# Patient Record
Sex: FEMALE | Race: WHITE | NOT HISPANIC OR LATINO | Employment: PART TIME | URBAN - METROPOLITAN AREA
[De-identification: names, ages, dates, MRNs, and addresses within clinical notes are randomized per-mention and may not be internally consistent; named-entity substitution may affect disease eponyms.]

---

## 2017-01-30 ENCOUNTER — COMMUNICATION - HEALTHEAST (OUTPATIENT)
Dept: TELEHEALTH | Facility: CLINIC | Age: 52
End: 2017-01-30

## 2017-01-30 ENCOUNTER — HOSPITAL ENCOUNTER (OUTPATIENT)
Dept: CT IMAGING | Facility: CLINIC | Age: 52
Discharge: HOME | End: 2017-01-30
Attending: PHYSICIAN ASSISTANT

## 2017-01-30 ENCOUNTER — OFFICE VISIT - HEALTHEAST (OUTPATIENT)
Dept: UROLOGY | Facility: CLINIC | Age: 52
End: 2017-01-30

## 2017-01-30 DIAGNOSIS — N29 NEPHROCALCINOSIS: ICD-10-CM

## 2017-01-30 DIAGNOSIS — N20.9 URINARY CALCULUS, UNSPECIFIED: ICD-10-CM

## 2017-01-30 DIAGNOSIS — E83.59 NEPHROCALCINOSIS: ICD-10-CM

## 2017-04-11 ENCOUNTER — HOSPITAL ENCOUNTER (EMERGENCY)
Facility: CLINIC | Age: 52
Discharge: HOME | End: 2017-04-11
Attending: EMERGENCY MEDICINE | Admitting: EMERGENCY MEDICINE
Payer: COMMERCIAL

## 2017-04-11 ENCOUNTER — APPOINTMENT (OUTPATIENT)
Dept: GENERAL RADIOLOGY | Facility: CLINIC | Age: 52
End: 2017-04-11
Attending: EMERGENCY MEDICINE
Payer: COMMERCIAL

## 2017-04-11 VITALS
DIASTOLIC BLOOD PRESSURE: 101 MMHG | HEIGHT: 67 IN | RESPIRATION RATE: 18 BRPM | HEART RATE: 83 BPM | WEIGHT: 165 LBS | SYSTOLIC BLOOD PRESSURE: 136 MMHG | TEMPERATURE: 97.3 F | OXYGEN SATURATION: 100 % | BODY MASS INDEX: 25.9 KG/M2

## 2017-04-11 DIAGNOSIS — M25.571 ACUTE RIGHT ANKLE PAIN: ICD-10-CM

## 2017-04-11 PROCEDURE — 99283 EMERGENCY DEPT VISIT LOW MDM: CPT

## 2017-04-11 PROCEDURE — 25000132 ZZH RX MED GY IP 250 OP 250 PS 637: Performed by: EMERGENCY MEDICINE

## 2017-04-11 PROCEDURE — 73610 X-RAY EXAM OF ANKLE: CPT | Mod: RT

## 2017-04-11 RX ORDER — OXYCODONE HYDROCHLORIDE 5 MG/1
5 TABLET ORAL ONCE
Status: COMPLETED | OUTPATIENT
Start: 2017-04-11 | End: 2017-04-11

## 2017-04-11 RX ORDER — OXYCODONE HYDROCHLORIDE 10 MG/1
10 TABLET ORAL EVERY 4 HOURS PRN
Qty: 10 TABLET | Refills: 0 | Status: SHIPPED | OUTPATIENT
Start: 2017-04-11 | End: 2018-01-24

## 2017-04-11 RX ADMIN — OXYCODONE HYDROCHLORIDE 5 MG: 5 TABLET ORAL at 22:05

## 2017-04-11 ASSESSMENT — ENCOUNTER SYMPTOMS
CHILLS: 0
FEVER: 0
NUMBNESS: 0

## 2017-04-11 NOTE — ED AVS SNAPSHOT
[facility] Emergency Department    [address]    [facility] [address]    Phone:  [phone]    Fax:  [phone]                                       Faby Yeung   MRN: [id number]    Department:  [facility] Emergency Department   Date of Visit:  4/11/2017           Patient Information     Date Of Birth          1965        Your diagnoses for this visit were:     Acute right ankle pain        You were seen by Chilango Renee MD.      Follow-up Information     Follow up with Chilango Kerns MD In 2 days.    Specialty:  Internal Medicine    Contact information:    [facility]  [address] [address]  [phone]          Follow up with Cheryl Arroyo MD In 1 week.    Specialty:  Internal Medicine    Why:  As needed    Contact information:    [facility]  [address]  [facility] [address]  [phone]          Discharge Instructions         Arthralgia    Arthralgia is the term for pain in or around the joint. It is a symptom, not a disease. This pain may involve one or more joints. In some cases, the pain moves from joint to joint.  There are many causes for joint pain. These include:    Injury    Osteoarthritis (wearing out of the joint surface)    Gout (inflammation of the joint due to crystals in the joint fluid)    Infection inside the joint      Bursitis (inflammation of the fluid-filled sacs around the joint)    Autoimmune disorders such as rheumatoid arthritis or lupus    Tendonitis (inflamation of chords that attach muscle to bone)  Home care    Rest the involved joint(s) until your symptoms improve.     You may be prescribed pain medication. If none is prescribed, you may use acetaminophen or ibuprofen to control pain and inflammation.  Follow up  Follow up with your healthcare provider or our staff as advised.  When to seek medical care  Contact your healthcare provider right away if any of the  following occurs:    Pain, swelling, or redness of joint increases    Pain worsens or recurs after a period of improvement    Pain moves to other joints    You cannot bear weight on the affected joint     You cannot move the affected joint    Joint appears deformed    New rash appears    Fever of 101 F (38.8 C) or higher, or as directed by your healthcare provider    6587-8518 [employer]. [address] [place] [address], [place] [address]. All rights reserved. This information is not intended as a substitute for professional medical care. Always follow your healthcare professional's instructions.          24 Hour Appointment Hotline       To make an appointment at any [facility], call [phone] ([phone]). If you don't have a family doctor or clinic, we will help you find one. [facility] clinics are conveniently located to serve the needs of you and your family.             Review of your medicines      START taking        Dose / Directions Last dose taken    oxyCODONE 10 MG IR tablet   Commonly known as:  ROXICODONE   Dose:  10 mg   Quantity:  10 tablet        Take 1 tablet (10 mg) by mouth every 4 hours as needed for moderate to severe pain   Refills:  0          Our records show that you are taking the medicines listed below. If these are incorrect, please call your family doctor or clinic.        Dose / Directions Last dose taken    clindamycin 300 MG capsule   Commonly known as:  CLEOCIN   Dose:  300 mg   Quantity:  14 capsule        Take 1 capsule (300 mg) by mouth 2 times daily   Refills:  0        LORazepam 1 MG tablet   Commonly known as:  ATIVAN   Dose:  1 mg        Take 1 mg by mouth 3 times daily as needed for anxiety   Refills:  0        oxyCODONE-acetaminophen  MG per tablet   Commonly known as:  PERCOCET   Dose:  1-2 tablet   Quantity:  32 tablet        Take 1-2 tablets by mouth every 6 hours as needed for moderate to severe pain   Refills:  0        PROTONIX PO         Refills:  0        SERTRALINE HCL PO   Dose:  150 mg        Take 150 mg by mouth daily   Refills:  0        sodium citrate/citric acid 500-334 MG/5ML Soln solution   Dose:  30 mL        Take 30 mLs by mouth 3 times daily   Refills:  0        SYNTHROID 150 MCG tablet   Dose:  150 mcg   Generic drug:  levothyroxine        Take 150 mcg by mouth daily   Refills:  0                Prescriptions were sent or printed at these locations (1 Prescription)                   Other Prescriptions                Printed at Department/Unit printer (1 of 1)         oxyCODONE (ROXICODONE) 10 MG IR tablet                Procedures and tests performed during your visit     Ankle XR, G/E 3 views, right      Orders Needing Specimen Collection     None      Pending Results     No orders found from 4/9/2017 to 4/12/2017.            Pending Culture Results     No orders found from 4/9/2017 to 4/12/2017.            Test Results From Your Hospital Stay        4/11/2017 10:29 PM      Narrative     ANKLE THREE VIEWS RIGHT  4/11/2017 10:15 PM     HISTORY: Right medial malleolar pain, history of ORIF.    COMPARISON: None.    FINDINGS: Screw fixation of the medial malleolus is noted without  abnormal lucencies around the hardware. Mild loss of joint space and  spurring. The ankle mortise appears intact. There is no acute fracture  or dislocation. There are no worrisome bony lesions.        Impression     IMPRESSION: No acute osseous abnormality demonstrated.    ADELA NUR MD                Clinical Quality Measure: Blood Pressure Screening     Your blood pressure was checked while you were in the emergency department today. The last reading we obtained was  BP: (!) 143/108 . Please read the guidelines below about what these numbers mean and what you should do about them.  If your systolic blood pressure (the top number) is less than 120 and your diastolic blood pressure (the bottom number) is less than 80, then your blood pressure is normal.  "There is nothing more that you need to do about it.  If your systolic blood pressure (the top number) is 120-139 or your diastolic blood pressure (the bottom number) is 80-89, your blood pressure may be higher than it should be. You should have your blood pressure rechecked within a year by a primary care provider.  If your systolic blood pressure (the top number) is 140 or greater or your diastolic blood pressure (the bottom number) is 90 or greater, you may have high blood pressure. High blood pressure is treatable, but if left untreated over time it can put you at risk for heart attack, stroke, or kidney failure. You should have your blood pressure rechecked by a primary care provider within the next 4 weeks.  If your provider in the emergency department today gave you specific instructions to follow-up with your doctor or provider even sooner than that, you should follow that instruction and not wait for up to 4 weeks for your follow-up visit.        Thank you for choosing [facility]       Thank you for choosing [facility] for your care. Our goal is always to provide you with excellent care. Hearing back from our patients is one way we can continue to improve our services. Please take a few minutes to complete the written survey that you may receive in the mail after you visit with us. Thank you!        [email or web address] Information     [employer] lets you send messages to your doctor, view your test results, renew your prescriptions, schedule appointments and more. To sign up, go to [email or web address] . Click on \"Log in\" on the left side of the screen, which will take you to the Welcome page. Then click on \"Sign up Now\" on the right side of the page.     You will be asked to enter the access code listed below, as well as some personal information. Please follow the directions to create your username and password.     Your access code is: [email or web address]  Expires: 7/10/2017 10:30 PM     Your access code will  in 90 days. " If you need help or a new code, please call your [facility] clinic or [phone].        Care EveryWhere ID     This is your Care EveryWhere ID. This could be used by other organizations to access your [facility] medical records  [id number]        After Visit Summary       This is your record. Keep this with you and show to your community pharmacist(s) and doctor(s) at your next visit.

## 2017-04-11 NOTE — ED AVS SNAPSHOT
[facility] Emergency Department    [address]    [facility] [address]    Phone:  [phone]    Fax:  [phone]                                       Faby Yeung   MRN: [id number]    Department:  [facility] Emergency Department   Date of Visit:  4/11/2017           After Visit Summary Signature Page     I have received my discharge instructions, and my questions have been answered. I have discussed any challenges I see with this plan with the nurse or doctor.    ..........................................................................................................................................  Patient/Patient Representative Signature      ..........................................................................................................................................  Patient Representative Print Name and Relationship to Patient    ..................................................               ................................................  Date                                            Time    ..........................................................................................................................................  Reviewed by Signature/Title    ...................................................              ..............................................  Date                                                            Time

## 2017-04-12 NOTE — DISCHARGE INSTRUCTIONS
Arthralgia    Arthralgia is the term for pain in or around the joint. It is a symptom, not a disease. This pain may involve one or more joints. In some cases, the pain moves from joint to joint.  There are many causes for joint pain. These include:    Injury    Osteoarthritis (wearing out of the joint surface)    Gout (inflammation of the joint due to crystals in the joint fluid)    Infection inside the joint      Bursitis (inflammation of the fluid-filled sacs around the joint)    Autoimmune disorders such as rheumatoid arthritis or lupus    Tendonitis (inflamation of chords that attach muscle to bone)  Home care    Rest the involved joint(s) until your symptoms improve.     You may be prescribed pain medication. If none is prescribed, you may use acetaminophen or ibuprofen to control pain and inflammation.  Follow up  Follow up with your healthcare provider or our staff as advised.  When to seek medical care  Contact your healthcare provider right away if any of the following occurs:    Pain, swelling, or redness of joint increases    Pain worsens or recurs after a period of improvement    Pain moves to other joints    You cannot bear weight on the affected joint     You cannot move the affected joint    Joint appears deformed    New rash appears    Fever of 101 F (38.8 C) or higher, or as directed by your healthcare provider    [id number] [employer]. [address] [place] [address], [place] [address]. All rights reserved. This information is not intended as a substitute for professional medical care. Always follow your healthcare professional's instructions.

## 2017-04-12 NOTE — ED PROVIDER NOTES
"  History     Chief Complaint:  Ankle Pain (right)      HPI   Faby Yueng is a 51 year old female who presents for evaluation of right ankle pain. The patient reports that she has needed hardware placed in both ankles for fracture repair. She last had surgery on her right ankle in 06/2016. She notes that she has been developing progressively worsening pain to her right ankle and is concerned that she \"could be starting to reject the hardware.\" Currently the patient rates her right ankle pain as 8/10 and describes it as sharp, worse with ambulation or palpation. She denies any recent trauma to the ankle. She notes that she does have Percocet for pain, but states that she only takes this as needed for kidney pain secondary to her multiple and frequent kidney stones. She did take a Tylenol earlier this morning, before work, and denies taking anything else today for pain management. The patient otherwise denies any fevers, chills, focal numbness, or any other physical concerns at this time.    Allergies:  Penicillins - Anaphylaxis  Ibuprofen Sodium   Levaquin   Sulfa Drugs      Medications:    Pantoprazole sodium (protonix po)  Oxycodone-acetaminophen (percocet)  mg per tablet  Clindamycin (cleocin) 300 mg capsule  Levothyroxine (synthroid) 150 mcg tablet  Sertraline hcl po  Sodium citrate/citric acid (bicitra) 500-334 mg/5ml soln  Lorazepam (ativan) 1 mg tablet     Past Medical History:    Anxiety  Cellulitis  Chronic abdominal pain  Depressive disorder  Foot pain  Gastro-oesophageal reflux disease  Hx of blood clots  Leukocytopenia, unspecified  Nephrolithiasis  Pain in joint, ankle and foot  PONV (postoperative nausea and vomiting)  Pulmonary embolism (H)  Raynaud's syndrome  Scoliosis  Unspecified gastritis and gastroduodenitis without mention of hemorrhage  Unspecified hypothyroidism  Urinary tract infection    Past Surgical History:    Appendectomy    Arthrodesis ankle; left talonavicular fusion " "(c-arm)   section    Cystoscopy    Genitourinary surgery; lithotripsy  Hernia repair    Hysterectomy total abdominal, bilateral salpingo-oophorectomy, combined    Left foot irrigation and debridement, hardware removal  Orthopedic surgery - wrist, ankle, sherie narciso placement  Spine surgery     Family History:  Family history reviewed. No relevant family history.     Social History:  Marital status: Single  Tobacco use: Former smoke  Alcohol use: yes  Patient presents to the ED dropped off by a friend. Her daughter arrived at the emergency department shortly after patient arrival.        Review of Systems   Constitutional: Negative for chills and fever.   Musculoskeletal:        Positive for right ankle pain.  Negative for recent right ankle trauma.   Neurological: Negative for numbness.   All other systems reviewed and are negative.      Physical Exam   First Vitals:  BP: (!) 143/108  Pulse: 83  Heart Rate: 83  Temp: 97.3  F (36.3  C)  Resp: 18  Height: 170.2 cm (5' 7\")  Weight: 74.8 kg (165 lb)  SpO2: 98 %    Physical Exam   Constitutional: She appears well-developed.   Cardiovascular: Normal rate.    Pulmonary/Chest: Effort normal.   Abdominal: Soft. Bowel sounds are normal.   Musculoskeletal:        Right ankle: Tenderness. Lateral malleolus tenderness found.   Nursing note and vitals reviewed.      Emergency Department Course     Imaging:  Radiographic findings were communicated with the patient who voiced understanding of the findings.    XR Ankle G/E 3 vws, right:  IMPRESSION: No acute osseous abnormality demonstrated.  ADELA NUR MD     Interventions:   Oxycodone IR 5 mg, PO    Oxycodone IR 5 mg, PO      The patient's symptoms were improved with parenteral narcotics.    Emergency Department Course:   Nursing notes and vitals reviewed. I obtained a history from the patient. I performed an exam of the patient as documented above. We discussed the plan of care including imaging " studies.    Findings and plan explained to the Patient. Patient discharged home with instructions regarding supportive care, medications, and reasons to return. The importance of close follow-up was reviewed. The patient was prescribed a short course off Oxycodone and was provided a CAM boot.     Impression & Plan      Medical Decision Making:  Faby Yeung is a 51 year old female who presents with ankle pain. The patient has a history of chronic pain from kidney stones as well as recurring ankle surgery. Review of her  shows that the patient has been given weekly Oxycodone up until March 30th. The patient's x-rays show no acute findings. The patient was discussed with about chronic opioid therapy. The patient adamantly denies that she is here for pain medication, but apparently is out of her 45 Oxycodone form 03/30. Ultimately, the patient was offered 10 pills to help with pain, although the patient is encouraged to limit the use of opioids due to the opioid crisis in the [place] and follow up with her primary care doctor as well as Orthopedics. The patient is encouraged to use a CAM boot due to pain.    Diagnosis:    ICD-10-CM    1. Acute right ankle pain M25.571 Acute exacerbation of chronic right ankle pain   2. H/o opiate dependence         Disposition:  discharged to home with family.    Discharge Medications:  Discharge Medication List as of 4/11/2017 10:36 PM      START taking these medications    Details   oxyCODONE (ROXICODONE) 10 MG IR tablet Take 1 tablet (10 mg) by mouth every 4 hours as needed for moderate to severe pain, Disp-10 tablet, R-0, Local Print           I, Brittney Gutierrez, am serving as a scribe on 4/11/2017 at 9:41 PM to personally document services performed by Chilango Renee MD, based on my observations and the provider's statements to me.       Brittney Gutierrez  4/11/2017   [facility] EMERGENCY DEPARTMENT       Chilango Renee MD  04/17/17  [id number]

## 2017-04-12 NOTE — ED NOTES
Pt comes in with right ankle pain starting last night and worsened throughout the day. Pt unable to put weight on ankle. Pt denies any injury. Pt did break ankle last summer and needed surgery and pt reports that everything that they have put in her body her body has rejected. Pt has used ice for pain, pt took tylenol for pain today.

## 2018-01-24 ENCOUNTER — APPOINTMENT (OUTPATIENT)
Dept: CT IMAGING | Facility: CLINIC | Age: 53
End: 2018-01-24
Attending: EMERGENCY MEDICINE
Payer: COMMERCIAL

## 2018-01-24 ENCOUNTER — APPOINTMENT (OUTPATIENT)
Dept: GENERAL RADIOLOGY | Facility: CLINIC | Age: 53
End: 2018-01-24
Attending: EMERGENCY MEDICINE
Payer: COMMERCIAL

## 2018-01-24 ENCOUNTER — HOSPITAL ENCOUNTER (EMERGENCY)
Facility: CLINIC | Age: 53
Discharge: HOME | End: 2018-01-24
Attending: EMERGENCY MEDICINE | Admitting: EMERGENCY MEDICINE
Payer: COMMERCIAL

## 2018-01-24 VITALS
RESPIRATION RATE: 16 BRPM | TEMPERATURE: 98.1 F | OXYGEN SATURATION: 98 % | DIASTOLIC BLOOD PRESSURE: 83 MMHG | SYSTOLIC BLOOD PRESSURE: 119 MMHG

## 2018-01-24 DIAGNOSIS — S60.00XA CONTUSION OF FINGER, UNSPECIFIED FINGER, UNSPECIFIED LATERALITY, INITIAL ENCOUNTER: ICD-10-CM

## 2018-01-24 DIAGNOSIS — S13.9XXA NECK SPRAIN, INITIAL ENCOUNTER: ICD-10-CM

## 2018-01-24 DIAGNOSIS — S09.90XA CLOSED HEAD INJURY, INITIAL ENCOUNTER: ICD-10-CM

## 2018-01-24 PROCEDURE — 70450 CT HEAD/BRAIN W/O DYE: CPT

## 2018-01-24 PROCEDURE — 25000132 ZZH RX MED GY IP 250 OP 250 PS 637: Performed by: EMERGENCY MEDICINE

## 2018-01-24 PROCEDURE — 99285 EMERGENCY DEPT VISIT HI MDM: CPT | Mod: 25

## 2018-01-24 PROCEDURE — 73140 X-RAY EXAM OF FINGER(S): CPT | Mod: RT

## 2018-01-24 PROCEDURE — 72125 CT NECK SPINE W/O DYE: CPT

## 2018-01-24 RX ORDER — OXYCODONE HYDROCHLORIDE 5 MG/1
10 TABLET ORAL ONCE
Status: COMPLETED | OUTPATIENT
Start: 2018-01-24 | End: 2018-01-24

## 2018-01-24 RX ORDER — TIZANIDINE 2 MG/1
2 TABLET ORAL 3 TIMES DAILY
Qty: 20 TABLET | Refills: 0 | Status: ON HOLD | OUTPATIENT
Start: 2018-01-24 | End: 2021-02-26

## 2018-01-24 RX ADMIN — OXYCODONE HYDROCHLORIDE 10 MG: 5 TABLET ORAL at 11:16

## 2018-01-24 ASSESSMENT — ENCOUNTER SYMPTOMS
NECK PAIN: 1
VOMITING: 0
HEADACHES: 1
NAUSEA: 1

## 2018-01-24 NOTE — ED NOTES
Pt slipped backwards on ice and fell and hit back of head at 6pm yesterday. Denies LOC, was nauseated a little yesterday. Presents today because HA is worse, mostly on top of head. Denies vision changes or being on blood thinners. ABCs intact. Pupils PERRLA

## 2018-01-24 NOTE — ED NOTES
Pt resting Comfortably on bed, drawing.  Alert & Oriented  No specific complaints.  c-collar has been removed.

## 2018-01-24 NOTE — ED AVS SNAPSHOT
[facility]    [address]    [facility] [address]    Phone:  [phone]    Fax:  [phone]                                       Faby Yeung   MRN: [id number]    Department:  [facility]   Date of Visit:  1/24/2018           Patient Information     Date Of Birth          1965        Your diagnoses for this visit were:     Closed head injury, initial encounter     Neck sprain, initial encounter     Contusion of finger, unspecified finger, unspecified laterality, initial encounter        You were seen by Chilango Renee MD.      Follow-up Information     Follow up with [place], Helena Mahajan In 1 week.    Contact information:    [address] [facility] [address]  [phone]          Follow up with [facility] In 1 week.    Why:  As needed    Contact information:    [address]  [address]  [facility] [id number] [phone]        Discharge Instructions         Your finger x-ray shows no clear fracture though there is a possible injury to the end of the long bone of the third digit.  Use splint for 1 week if ongoing pain see primary care or orthopedics for repeat x-ray.    Discharge Instructions  Neck Strain    You have been seen today for a neck sprain or strain.  Neck strains usually result from an injury to the neck. Car accidents, contact sports and falls are common causes of neck strain. Sometimes your neck can start to hurt because of increased activity, muscle tension, an abnormal sleeping position, or because of other problems like arthritis in the neck.     Neck pain usually comes from injured muscles and ligaments. Sometimes there is a herniated ( slipped ) disc. We don t usually do MRI scans to look for these right away, since most herniated discs will get better on their own with time. Today, we did not find any evidence that your neck pain was  caused by a serious condition, such as an infection, fracture, or tumor. However, sometimes symptoms develop over time and cannot be found during an emergency visit, so it is very important that you follow up with your primary doctor.    Return to the Emergency Department if:    You have increasing pain in your neck.    You develop difficulty swallowing or breathing.    You have numbness, weakness, or trouble moving your arms or legs.    You have severe dizziness and difficulty walking.    You are unable to control your bladder or bowels.    You develop severe headache or ringing in the ears.    Call your doctor if:     Your neck pain is not controlled with the medicine we gave you.    You are not back to normal within 1 week.    What can I do to help myself at home?    If you had an injury, use cold for the first 1-2 days. Cold helps relieve pain and reduce inflammation.  Apply ice packs to the neck or areas of pain every 1-2 hours for 20 minutes at a time. Place a towel or cloth between your skin and the ice pack.    After the first 2 days, using heat can help with neck pain and stiffness. You may use a warm shower or bath, warm towels on the neck, or a heating pad. Do not sleep with a heating pad, as you can be burned.     Pain medications - You may take a pain medication such as Tylenol  (acetaminophen), Advil , Nuprin  (ibuprofen) or Aleve  (naproxen).  If you have been given a narcotic such as Vicodin  (hydrocodone with acetaminophen), Percocet  (oxycodone with acetaminophen), codeine, or a muscle relaxant such as Flexeril  (cyclobenzaprine) or Soma  (carisoprodol), do not drive for four hours after you have taken it. If the narcotic contains Tylenol  (acetaminophen), do not take Tylenol  with it. All narcotics will cause constipation, so eat a high fiber diet.      It is usually best to rest the neck for 1-2 days after an injury, then start gentle stretching exercises.     It is helpful to place a small  pillow under the nape of your neck to provide proper neutral positioning.     You should stay active and do your usual work as much as you can, unless this involves heavy physical labor. Ask your doctor if you need work restrictions.  If you were given a prescription for medicine here today, be sure to read all of the information (including the package insert) that comes with your prescription.  This will include important information about the medicine, its side effects, and any warnings that you need to know about.  The pharmacist who fills the prescription can provide more information and answer questions you may have about the medicine.  If you have questions or concerns that the pharmacist cannot address, please call or return to the Emergency Department.   Opioid Medication Information    Pain medications are among the most commonly prescribed medicines, so we are including this information for all our patients. If you did not receive pain medication or get a prescription for pain medicine, you can ignore it.     You may have been given a prescription for an opioid (narcotic) pain medicine and/or have received a pain medicine while here in the Emergency Department. These medicines can make you drowsy or impaired. You must not drive, operate dangerous equipment, or engage in any other dangerous activities while taking these medications. If you drive while taking these medications, you could be arrested for DUI, or driving under the influence. Do not drink any alcohol while you are taking these medications.     Opioid pain medications can cause addiction. If you have a history of chemical dependency of any type, you are at a higher risk of becoming addicted to pain medications.  Only take these prescribed medications to treat your pain when all other options have been tried. Take it for as short a time and as few doses as possible. Store your pain pills in a secure place, as they are frequently stolen and  provide a dangerous opportunity for children or visitors in your house to start abusing these powerful medications. We will not replace any lost or stolen medicine.  As soon as your pain is better, you should flush all your remaining medication.     Many prescription pain medications contain Tylenol  (acetaminophen), including Vicodin , Tylenol #3 , Norco , Lortab , and Percocet .  You should not take any extra pills of Tylenol  if you are using these prescription medications or you can get very sick.  Do not ever take more than 3000 mg of acetaminophen in any 24 hour period.    All opioids tend to cause constipation. Drink plenty of water and eat foods that have a lot of fiber, such as fruits, vegetables, prune juice, apple juice and high fiber cereal.  Take a laxative if you don t move your bowels at least every other day. Miralax , Milk of Magnesia, Colace , or Senna  can be used to keep you regular.      Remember that you can always come back to the Emergency Department if you are not able to see your regular doctor in the amount of time listed above, if you get any new symptoms, or if there is anything that worries you.    Discharge Instructions  Head Injury    You have been seen today for a head injury. You were checked for serious problems, like bleeding on the brain, but these problems cannot always be found right away.  Due to this risk, you should not be alone for 24 hours after your injury.  Follow up with your regular physician in 7 days. If you are taking a blood thinner, such as aspirin, Pradaxa  (dabigatran), Coumadin  (warfarin), or Plavix  (clopidogrel), you are at especially high risk for immediate or delayed bleeding, and need to re-check with a physician in 24 hours, or sooner if any of the symptoms below happen.     Return to the Emergency Department if:    You are confused, have amnesia, or you are not acting right.    Your headache gets worse or you start to have a really bad headache even  with your recommended treatment plan.    You vomit more than once.    You have a convulsion or seizure.    You have trouble walking.    You have weakness or paralysis in an arm or a leg.    You have blood or fluid coming from your ears or nose.    You have new symptoms or anything that worries you.    Sleeping:  It is okay for you to sleep, but someone should wake you up as instructed by your doctor, and someone should check on you at your usual time to wake up.     Activity:    Do not drive for at least 24 hours.    Do not drive if you have dizzy spells or trouble concentrating, or remembering things.    Do not return to any contact sports until cleared by your regular doctor.     Follow-up:  It is very important that you make an appointment with your clinic and go to the appointment.  If you do not follow-up with your regular doctor, it may result in missing an important development which could result in permanent injury or disability and/or lasting pain.  If there is any problem keeping your appointment, call your doctor or return to the Emergency Department.    MORE INFORMATION:    Concussion:  A concussion is a minor head injury that may cause temporary problems with the way your brain works.  Some symptoms include:  confusion, amnesia, nausea and vomiting, dizziness, fatigue, memory or concentration problems, irritability and sleep problems.    CT Scans: Your evaluation today may have included a CT scan (CAT scan) to look for things like bleeding or a skull fracture (break).  CT scans involve radiation and too many CT scans can cause serious health problems like cancer, especially in children.  Because of this, your doctor may not have ordered a CT scan today if they think you are at low risk for a serious or life threatening problem.    If you were given a prescription for medicine here today, be sure to read all of the information (including the package insert) that comes with your prescription.  This will  include important information about the medicine, its side effects, and any warnings that you need to know about.  The pharmacist who fills the prescription can provide more information and answer questions you may have about the medicine.  If you have questions or concerns that the pharmacist cannot address, please call or return to the Emergency Department.     Opioid Medication Information    Pain medications are among the most commonly prescribed medicines, so we are including this information for all our patients. If you did not receive pain medication or get a prescription for pain medicine, you can ignore it.     You may have been given a prescription for an opioid (narcotic) pain medicine and/or have received a pain medicine while here in the Emergency Department. These medicines can make you drowsy or impaired. You must not drive, operate dangerous equipment, or engage in any other dangerous activities while taking these medications. If you drive while taking these medications, you could be arrested for DUI, or driving under the influence. Do not drink any alcohol while you are taking these medications.     Opioid pain medications can cause addiction. If you have a history of chemical dependency of any type, you are at a higher risk of becoming addicted to pain medications.  Only take these prescribed medications to treat your pain when all other options have been tried. Take it for as short a time and as few doses as possible. Store your pain pills in a secure place, as they are frequently stolen and provide a dangerous opportunity for children or visitors in your house to start abusing these powerful medications. We will not replace any lost or stolen medicine.  As soon as your pain is better, you should flush all your remaining medication.     Many prescription pain medications contain Tylenol  (acetaminophen), including Vicodin , Tylenol #3 , Norco , Lortab , and Percocet .  You should not take any  extra pills of Tylenol  if you are using these prescription medications or you can get very sick.  Do not ever take more than 3000 mg of acetaminophen in any 24 hour period.    All opioids tend to cause constipation. Drink plenty of water and eat foods that have a lot of fiber, such as fruits, vegetables, prune juice, apple juice and high fiber cereal.  Take a laxative if you don t move your bowels at least every other day. Miralax , Milk of Magnesia, Colace , or Senna  can be used to keep you regular.      Remember that you can always come back to the Emergency Department if you are not able to see your regular doctor in the amount of time listed above, if you get any new symptoms, or if there is anything that worries you.            24 Hour Appointment Hotline       To make an appointment at any [facility], call [phone] ([phone]). If you don't have a family doctor or clinic, we will help you find one. [facility] are conveniently located to serve the needs of you and your family.             Review of your medicines      START taking        Dose / Directions Last dose taken    tiZANidine 2 MG tablet   Commonly known as:  ZANAFLEX   Dose:  2 mg   Quantity:  20 tablet        Take 1 tablet (2 mg) by mouth 3 times daily   Refills:  0          Our records show that you are taking the medicines listed below. If these are incorrect, please call your family doctor or clinic.        Dose / Directions Last dose taken    GABAPENTIN PO        Refills:  0        LORazepam 1 MG tablet   Commonly known as:  ATIVAN   Dose:  1 mg        Take 1 mg by mouth 3 times daily as needed for anxiety   Refills:  0        oxyCODONE-acetaminophen  MG per tablet   Commonly known as:  PERCOCET   Dose:  1-2 tablet   Quantity:  32 tablet        Take 1-2 tablets by mouth every 6 hours as needed for moderate to severe pain   Refills:  0        PROTONIX PO        Refills:  0        SERTRALINE HCL PO   Dose:  150 mg         Take 150 mg by mouth daily   Refills:  0        sodium citrate/citric acid 500-334 MG/5ML Soln solution   Dose:  30 mL        Take 30 mLs by mouth 3 times daily   Refills:  0        SYNTHROID 150 MCG tablet   Dose:  150 mcg   Generic drug:  levothyroxine        Take 150 mcg by mouth daily   Refills:  0                Prescriptions were sent or printed at these locations (1 Prescription)                   Other Prescriptions                Printed at Department/Unit printer (1 of 1)         tiZANidine (ZANAFLEX) 2 MG tablet                Procedures and tests performed during your visit     CT Cervical Spine w/o Contrast    Fingers XR, 2-3 views, right    Head CT w/o contrast      Orders Needing Specimen Collection     None      Pending Results     Date and Time Order Name Status Description    1/24/2018 1048 Fingers XR, 2-3 views, right Preliminary     1/24/2018 1048 CT Cervical Spine w/o Contrast Preliminary     1/24/2018 1048 Head CT w/o contrast Preliminary             Pending Culture Results     No orders found from 1/22/2018 to 1/25/2018.            Pending Results Instructions     If you had any lab results that were not finalized at the time of your Discharge, you can call the ED Lab Result RN at [phone]. You will be contacted by this team for any positive Lab results or changes in treatment. The nurses are available 7 days a week from 10A to 6:30P.  You can leave a message 24 hours per day and they will return your call.        Test Results From Your Hospital Stay        1/24/2018 12:15 PM      Narrative     CT OF THE HEAD WITHOUT CONTRAST 1/24/2018 12:09 PM     COMPARISON: Head CT 6/8/2013    HISTORY: Right head injury. Evaluate for skull fracture.     TECHNIQUE: Axial CT images of the head from the skull base to the  vertex were acquired without IV contrast.    FINDINGS: The ventricles and basal cisterns are within normal limits  in configuration. There is no midline shift. There are no  extra-axial  fluid collections. Gray-white differentiation is well maintained.    No intracranial hemorrhage, mass or recent infarct.    The visualized paranasal sinuses are well-aerated. There is no  mastoiditis. There are no fractures of the visualized bones.        Impression     IMPRESSION: Normal head CT.      Radiation dose for this scan was reduced using automated exposure  control, adjustment of the mA and/or kV according to patient size, or  iterative reconstruction technique.         1/24/2018 12:19 PM      Narrative     CT OF THE CERVICAL SPINE WITHOUT CONTRAST January 24, 2018 12:08 PM     HISTORY: Neck pain, evaluate for fracture.      TECHNIQUE: Axial images of the cervical spine were acquired without  intravenous contrast. Multiplanar reformations were created. Radiation  dose for this scan was reduced using automated exposure control,  adjustment of the mA and/or kV according to patient size, or iterative  reconstruction technique.    COMPARISON: None    FINDINGS: There is normal alignment of the cervical vertebrae;  however, there is reversal of normal cervical lordosis centered at the  C6 level. Vertebral body heights of the cervical spine are normal.  Craniocervical alignment is normal. There are no fractures of the  cervical spine. There is severe degenerative endplate spurring at the  C5-C6, C6-C7 and C7-T1 levels. There is no spinal canal narrowing of  the cervical spine. There is no prevertebral soft tissue swelling.        Impression     IMPRESSION: Severe degenerative changes of the cervical spine from C5  through C7 resulting in moderate cervical spine kyphosis centered at  C6. No evidence for fracture or traumatic malalignment.             1/24/2018 12:16 PM      Narrative     RIGHT FINGER TWO OR MORE VIEWS  1/24/2018 12:12 PM    HISTORY:  Third digit pain, fall on ice.     COMPARISON:  None.        Impression     IMPRESSION:  One view of the hand and 2 additional views of the  middle  finger. No definite fractures. No evidence for dislocation. On the AP  view only there is a longitudinal linear lucency projecting over the  distal shaft of the third metacarpal. This may represent summation  artifact but in the clinical setting of trauma, it could represent a  subtle fracture. Correlate clinically for site of focal pain.                Clinical Quality Measure: Blood Pressure Screening     Your blood pressure was checked while you were in the emergency department today. The last reading we obtained was  BP: 119/83 . Please read the guidelines below about what these numbers mean and what you should do about them.  If your systolic blood pressure (the top number) is less than 120 and your diastolic blood pressure (the bottom number) is less than 80, then your blood pressure is normal. There is nothing more that you need to do about it.  If your systolic blood pressure (the top number) is 120-139 or your diastolic blood pressure (the bottom number) is 80-89, your blood pressure may be higher than it should be. You should have your blood pressure rechecked within a year by a primary care provider.  If your systolic blood pressure (the top number) is 140 or greater or your diastolic blood pressure (the bottom number) is 90 or greater, you may have high blood pressure. High blood pressure is treatable, but if left untreated over time it can put you at risk for heart attack, stroke, or kidney failure. You should have your blood pressure rechecked by a primary care provider within the next 4 weeks.  If your provider in the emergency department today gave you specific instructions to follow-up with your doctor or provider even sooner than that, you should follow that instruction and not wait for up to 4 weeks for your follow-up visit.        Thank you for choosing [facility]       Thank you for choosing [facility] for your care. Our goal is always to provide you with excellent care. Hearing back from  "our patients is one way we can continue to improve our services. Please take a few minutes to complete the written survey that you may receive in the mail after you visit with us. Thank you!        [employer] Social [employer] Information     [employer] Dot lets you send messages to your doctor, view your test results, renew your prescriptions, schedule appointments and more. To sign up, go to [email or web address] . Click on \"Log in\" on the left side of the screen, which will take you to the Welcome page. Then click on \"Sign up Now\" on the right side of the page.     You will be asked to enter the access code listed below, as well as some personal information. Please follow the directions to create your username and password.     Your access code is: [email or web address]  Expires: 2018 12:47 PM     Your access code will  in 90 days. If you need help or a new code, please call your [place] clinic or [phone].        Care EveryWhere ID     This is your Care EveryWhere ID. This could be used by other organizations to access your [place] medical records  [id number]        Equal Access to Services     GRAHAM CATALAN : Hadmariano Perez, maryanne sood, stewart andre, aysha howe . So [facility] [phone].    ATENCIÓN: Si habla español, tiene a bradley disposición servicios gratuitos de asistencia lingüística. Monica al [phone].    We comply with applicable federal civil rights laws and [place] laws. We do not discriminate on the basis of race, color, national origin, age, disability, sex, sexual orientation, or gender identity.            After Visit Summary       This is your record. Keep this with you and show to your community pharmacist(s) and doctor(s) at your next visit.                  "

## 2018-01-24 NOTE — DISCHARGE INSTRUCTIONS
Your finger x-ray shows no clear fracture though there is a possible injury to the end of the long bone of the third digit.  Use splint for 1 week if ongoing pain see primary care or orthopedics for repeat x-ray.    Discharge Instructions  Neck Strain    You have been seen today for a neck sprain or strain.  Neck strains usually result from an injury to the neck. Car accidents, contact sports and falls are common causes of neck strain. Sometimes your neck can start to hurt because of increased activity, muscle tension, an abnormal sleeping position, or because of other problems like arthritis in the neck.     Neck pain usually comes from injured muscles and ligaments. Sometimes there is a herniated ( slipped ) disc. We don t usually do MRI scans to look for these right away, since most herniated discs will get better on their own with time. Today, we did not find any evidence that your neck pain was caused by a serious condition, such as an infection, fracture, or tumor. However, sometimes symptoms develop over time and cannot be found during an emergency visit, so it is very important that you follow up with your primary doctor.    Return to the Emergency Department if:    You have increasing pain in your neck.    You develop difficulty swallowing or breathing.    You have numbness, weakness, or trouble moving your arms or legs.    You have severe dizziness and difficulty walking.    You are unable to control your bladder or bowels.    You develop severe headache or ringing in the ears.    Call your doctor if:     Your neck pain is not controlled with the medicine we gave you.    You are not back to normal within 1 week.    What can I do to help myself at home?    If you had an injury, use cold for the first 1-2 days. Cold helps relieve pain and reduce inflammation.  Apply ice packs to the neck or areas of pain every 1-2 hours for 20 minutes at a time. Place a towel or cloth between your skin and the ice  pack.    After the first 2 days, using heat can help with neck pain and stiffness. You may use a warm shower or bath, warm towels on the neck, or a heating pad. Do not sleep with a heating pad, as you can be burned.     Pain medications - You may take a pain medication such as Tylenol  (acetaminophen), Advil , Nuprin  (ibuprofen) or Aleve  (naproxen).  If you have been given a narcotic such as Vicodin  (hydrocodone with acetaminophen), Percocet  (oxycodone with acetaminophen), codeine, or a muscle relaxant such as Flexeril  (cyclobenzaprine) or Soma  (carisoprodol), do not drive for four hours after you have taken it. If the narcotic contains Tylenol  (acetaminophen), do not take Tylenol  with it. All narcotics will cause constipation, so eat a high fiber diet.      It is usually best to rest the neck for 1-2 days after an injury, then start gentle stretching exercises.     It is helpful to place a small pillow under the nape of your neck to provide proper neutral positioning.     You should stay active and do your usual work as much as you can, unless this involves heavy physical labor. Ask your doctor if you need work restrictions.  If you were given a prescription for medicine here today, be sure to read all of the information (including the package insert) that comes with your prescription.  This will include important information about the medicine, its side effects, and any warnings that you need to know about.  The pharmacist who fills the prescription can provide more information and answer questions you may have about the medicine.  If you have questions or concerns that the pharmacist cannot address, please call or return to the Emergency Department.   Opioid Medication Information    Pain medications are among the most commonly prescribed medicines, so we are including this information for all our patients. If you did not receive pain medication or get a prescription for pain medicine, you can ignore it.      You may have been given a prescription for an opioid (narcotic) pain medicine and/or have received a pain medicine while here in the Emergency Department. These medicines can make you drowsy or impaired. You must not drive, operate dangerous equipment, or engage in any other dangerous activities while taking these medications. If you drive while taking these medications, you could be arrested for DUI, or driving under the influence. Do not drink any alcohol while you are taking these medications.     Opioid pain medications can cause addiction. If you have a history of chemical dependency of any type, you are at a higher risk of becoming addicted to pain medications.  Only take these prescribed medications to treat your pain when all other options have been tried. Take it for as short a time and as few doses as possible. Store your pain pills in a secure place, as they are frequently stolen and provide a dangerous opportunity for children or visitors in your house to start abusing these powerful medications. We will not replace any lost or stolen medicine.  As soon as your pain is better, you should flush all your remaining medication.     Many prescription pain medications contain Tylenol  (acetaminophen), including Vicodin , Tylenol #3 , Norco , Lortab , and Percocet .  You should not take any extra pills of Tylenol  if you are using these prescription medications or you can get very sick.  Do not ever take more than 3000 mg of acetaminophen in any 24 hour period.    All opioids tend to cause constipation. Drink plenty of water and eat foods that have a lot of fiber, such as fruits, vegetables, prune juice, apple juice and high fiber cereal.  Take a laxative if you don t move your bowels at least every other day. Miralax , Milk of Magnesia, Colace , or Senna  can be used to keep you regular.      Remember that you can always come back to the Emergency Department if you are not able to see your regular doctor  in the amount of time listed above, if you get any new symptoms, or if there is anything that worries you.    Discharge Instructions  Head Injury    You have been seen today for a head injury. You were checked for serious problems, like bleeding on the brain, but these problems cannot always be found right away.  Due to this risk, you should not be alone for 24 hours after your injury.  Follow up with your regular physician in 7 days. If you are taking a blood thinner, such as aspirin, Pradaxa  (dabigatran), Coumadin  (warfarin), or Plavix  (clopidogrel), you are at especially high risk for immediate or delayed bleeding, and need to re-check with a physician in 24 hours, or sooner if any of the symptoms below happen.     Return to the Emergency Department if:    You are confused, have amnesia, or you are not acting right.    Your headache gets worse or you start to have a really bad headache even with your recommended treatment plan.    You vomit more than once.    You have a convulsion or seizure.    You have trouble walking.    You have weakness or paralysis in an arm or a leg.    You have blood or fluid coming from your ears or nose.    You have new symptoms or anything that worries you.    Sleeping:  It is okay for you to sleep, but someone should wake you up as instructed by your doctor, and someone should check on you at your usual time to wake up.     Activity:    Do not drive for at least 24 hours.    Do not drive if you have dizzy spells or trouble concentrating, or remembering things.    Do not return to any contact sports until cleared by your regular doctor.     Follow-up:  It is very important that you make an appointment with your clinic and go to the appointment.  If you do not follow-up with your regular doctor, it may result in missing an important development which could result in permanent injury or disability and/or lasting pain.  If there is any problem keeping your appointment, call your doctor  or return to the Emergency Department.    MORE INFORMATION:    Concussion:  A concussion is a minor head injury that may cause temporary problems with the way your brain works.  Some symptoms include:  confusion, amnesia, nausea and vomiting, dizziness, fatigue, memory or concentration problems, irritability and sleep problems.    CT Scans: Your evaluation today may have included a CT scan (CAT scan) to look for things like bleeding or a skull fracture (break).  CT scans involve radiation and too many CT scans can cause serious health problems like cancer, especially in children.  Because of this, your doctor may not have ordered a CT scan today if they think you are at low risk for a serious or life threatening problem.    If you were given a prescription for medicine here today, be sure to read all of the information (including the package insert) that comes with your prescription.  This will include important information about the medicine, its side effects, and any warnings that you need to know about.  The pharmacist who fills the prescription can provide more information and answer questions you may have about the medicine.  If you have questions or concerns that the pharmacist cannot address, please call or return to the Emergency Department.     Opioid Medication Information    Pain medications are among the most commonly prescribed medicines, so we are including this information for all our patients. If you did not receive pain medication or get a prescription for pain medicine, you can ignore it.     You may have been given a prescription for an opioid (narcotic) pain medicine and/or have received a pain medicine while here in the Emergency Department. These medicines can make you drowsy or impaired. You must not drive, operate dangerous equipment, or engage in any other dangerous activities while taking these medications. If you drive while taking these medications, you could be arrested for DUI, or  driving under the influence. Do not drink any alcohol while you are taking these medications.     Opioid pain medications can cause addiction. If you have a history of chemical dependency of any type, you are at a higher risk of becoming addicted to pain medications.  Only take these prescribed medications to treat your pain when all other options have been tried. Take it for as short a time and as few doses as possible. Store your pain pills in a secure place, as they are frequently stolen and provide a dangerous opportunity for children or visitors in your house to start abusing these powerful medications. We will not replace any lost or stolen medicine.  As soon as your pain is better, you should flush all your remaining medication.     Many prescription pain medications contain Tylenol  (acetaminophen), including Vicodin , Tylenol #3 , Norco , Lortab , and Percocet .  You should not take any extra pills of Tylenol  if you are using these prescription medications or you can get very sick.  Do not ever take more than 3000 mg of acetaminophen in any 24 hour period.    All opioids tend to cause constipation. Drink plenty of water and eat foods that have a lot of fiber, such as fruits, vegetables, prune juice, apple juice and high fiber cereal.  Take a laxative if you don t move your bowels at least every other day. Miralax , Milk of Magnesia, Colace , or Senna  can be used to keep you regular.      Remember that you can always come back to the Emergency Department if you are not able to see your regular doctor in the amount of time listed above, if you get any new symptoms, or if there is anything that worries you.

## 2018-01-24 NOTE — ED PROVIDER NOTES
History     Chief Complaint:  Fall    HPI   Faby Yeung is a 52 year old right-handed female, with a complicated past medical history including history of blood clots, depression, and anxiety amongst others as noted below, who is currently not anticoagulated, who presents alone to the emergency department for evaluation post a fall she endured yesterday at 1800 in a [employer] parking lot. Patient reports that she had slipped on the ice, falling backwards and hitting the back of her head. She also reports associated pain in her neck, jaw and her right middle finger. She denies any loss of consciousness or vomiting at the time, but endorsed that she felt a little nauseated. She presents today after increased headache, primarily at the top of her head. She denies any visual changes.     Allergies:  Penicillins  Ibuprofen sodium  Levaquin  Sulfa drugs     Medications:    Gabapentin  Protonix  Percocet  Synthroid  Sertraline  Bicitra  Ativan    Past Medical History:    Anxiety  Depressive disorder  GERD  Hx of blood clots  Leukocytopenia  Nephrolithiasis  Other specified disorders resulting from impaired renal function  Pain in joint, ankle and foot  PONV  Raynaud's syndrome  Scoliosis  Unspecified hypothyroidism  Unspecific gastritis and gastroduodenitis without mention of hemorrhage  Urinary tract infection    Past Surgical History:    Appendectomy  Arthrodesis ankle    Cytoscopy  Genitourinary surgery - lithotripsy  Hernia repair  Hysterectomy total abdominal bilateral salpingo-oophorectomy combined  Orthopedic surgery - wrist ankle sherie narciso placement  Spine surgery    Family History:    The patient denies any relevant family medical history.     Social History:  The patient was accompanied to the ED alone.  Smoking Status: Former  Smokeless Tobacco: No  Alcohol Use: Yes   Marital Status:  Single [1]     Review of Systems   Eyes: Negative for visual disturbance.   Gastrointestinal: Positive for  nausea. Negative for vomiting.   Musculoskeletal: Positive for neck pain.        Pain in right middle finger   Neurological: Positive for headaches. Negative for syncope.   All other systems reviewed and are negative.    Physical Exam   Vitals:  Patient Vitals for the past 24 hrs:   BP Temp Temp src Heart Rate Resp SpO2   01/24/18 1036 (!) 145/102 98.1  F (36.7  C) Oral 71 16 96 %   01/24/18 1035 (!) 145/102 - - - - -      Physical Exam   Constitutional: She is oriented to person, place, and time. She appears well-developed.   HENT:   Head: Normocephalic.   Right Ear: External ear normal.   Mouth/Throat: Oropharynx is clear and moist.   There is a moderately large hematoma over the right parietal scalp.  This extends to the vertex.  There is no open wound.  There is no bleeding.   Eyes: Conjunctivae and EOM are normal. Pupils are equal, round, and reactive to light.   Neck: Normal range of motion. Neck supple. No JVD present. Spinous process tenderness present.   Cardiovascular: Normal rate, regular rhythm and normal heart sounds.    Pulmonary/Chest: Effort normal and breath sounds normal.   Abdominal: Soft. Bowel sounds are normal. She exhibits no distension. There is no tenderness. There is no rebound.   Musculoskeletal: Normal range of motion.        Hands:  Lymphadenopathy:     She has no cervical adenopathy.   Neurological: She is alert and oriented to person, place, and time. She displays normal reflexes. No cranial nerve deficit. She exhibits normal muscle tone. Coordination normal. GCS eye subscore is 4. GCS verbal subscore is 5. GCS motor subscore is 6.   Skin: Skin is warm and dry.   Psychiatric: She has a normal mood and affect. Her behavior is normal. Judgment normal.       Emergency Department Course     Imaging:  Radiology findings were communicated with the patient who voiced understanding of the findings.  CT Cervical Spine w/o Contrast:  IMPRESSION: Severe degenerative changes of the cervical spine  from C5  through C7 resulting in moderate cervical spine kyphosis centered at  C6. No evidence for fracture or traumatic malalignment.  Reading per radiology.     Fingers XR right:  IMPRESSION:  One view of the hand and 2 additional views of the middle  finger. No definite fractures. No evidence for dislocation. On the AP  view only there is a longitudinal linear lucency projecting over the  distal shaft of the third metacarpal. This may represent summation  artifact but in the clinical setting of trauma, it could represent a  subtle fracture. Correlate clinically for site of focal pain.  Reading per radiology.     Head CT w/o Contrast:  IMPRESSION: Normal head CT.  Reading per radiology.     Interventions:  1116 Roxicodone 10 mg PO     Emergency Department Course:  Nursing notes and vitals reviewed.  The patient was sent for a CT Cervical Spine w/o Contrast, Fingers XR right, Head CT w/o Contrast while in the emergency department, results above.      10:42 AM: I performed an exam of the patient as documented above.   12:26 PM: I updated patient pertaining results. Discussed plan of care.     I discussed the treatment plan with the patient. They expressed understanding of this plan and consented to discharge. They will be discharged home with instructions for care and follow up. In addition, the patient will return to the emergency department if their symptoms persist, worsen, if new symptoms arise or if there is any concern.  All questions were answered.     I personally reviewed the imaging results with the Patient and answered all related questions prior to discharge.    Impression & Plan      Medical Decision Making:  Patient presents after mechanical fall.  Has a history of head injury.  There is no clear loss of consciousness though patient complains of nausea and headache as well as neck pain.  Patient was placed in a c-collar in the emergency room.  Imaging of the head and neck were performed without signs  for subdural subarachnoid skull fracture or C-spine injury.  Patient complained of finger pain but pain is at the tip of the third digit x-rays do identify a vague abnormality at the distal head of the metacarpal.  For now will recommend follow-up with orthopedics for repeat x-rays and  long finger splint.  No need for admission was identified.    Diagnosis:    ICD-10-CM    1. Closed head injury, initial encounter S09.90XA    2. Neck sprain, initial encounter S13.9XXA    3. Contusion of finger, unspecified finger, unspecified laterality, initial encounter S60.00XA         Disposition:   Discharged.    Discharge Medications:  New Prescriptions    TIZANIDINE (ZANAFLEX) 2 MG TABLET    Take 1 tablet (2 mg) by mouth 3 times daily       Scribe Disclosure:  Olivia MCKENZIE, am serving as a scribe at 10:42 AM on 1/24/2018 to document services personally performed by Chilango Renee MD, based on my observations and the provider's statements to me.  1/24/2018   [facility] EMERGENCY DEPARTMENT       Chilango Renee MD  01/25/18 0718

## 2018-01-24 NOTE — ED AVS SNAPSHOT
[facility] Emergency Department    [address]    [facility] [address]    Phone:  [phone]    Fax:  [phone]                                       Faby Yeung   MRN: [id number]    Department:  [facility] Emergency Department   Date of Visit:  1/24/2018           After Visit Summary Signature Page     I have received my discharge instructions, and my questions have been answered. I have discussed any challenges I see with this plan with the nurse or doctor.    ..........................................................................................................................................  Patient/Patient Representative Signature      ..........................................................................................................................................  Patient Representative Print Name and Relationship to Patient    ..................................................               ................................................  Date                                            Time    ..........................................................................................................................................  Reviewed by Signature/Title    ...................................................              ..............................................  Date                                                            Time

## 2018-10-14 ENCOUNTER — HOSPITAL ENCOUNTER (EMERGENCY)
Facility: CLINIC | Age: 53
Discharge: HOME | End: 2018-10-14
Attending: EMERGENCY MEDICINE | Admitting: EMERGENCY MEDICINE

## 2018-10-14 ENCOUNTER — APPOINTMENT (OUTPATIENT)
Dept: CT IMAGING | Facility: CLINIC | Age: 53
End: 2018-10-14
Attending: EMERGENCY MEDICINE

## 2018-10-14 VITALS
TEMPERATURE: 97 F | BODY MASS INDEX: 28.12 KG/M2 | WEIGHT: 175 LBS | OXYGEN SATURATION: 99 % | SYSTOLIC BLOOD PRESSURE: 104 MMHG | HEIGHT: 66 IN | DIASTOLIC BLOOD PRESSURE: 88 MMHG | RESPIRATION RATE: 18 BRPM

## 2018-10-14 DIAGNOSIS — R10.9 FLANK PAIN: ICD-10-CM

## 2018-10-14 DIAGNOSIS — N28.9 RENAL INSUFFICIENCY: ICD-10-CM

## 2018-10-14 LAB
ALBUMIN UR-MCNC: 30 MG/DL
ANION GAP SERPL CALCULATED.3IONS-SCNC: 4 MMOL/L (ref 3–14)
APPEARANCE UR: CLEAR
BASOPHILS # BLD AUTO: 0 10E9/L (ref 0–0.2)
BASOPHILS NFR BLD AUTO: 0.6 %
BILIRUB UR QL STRIP: NEGATIVE
BUN SERPL-MCNC: 21 MG/DL (ref 7–30)
CALCIUM SERPL-MCNC: 8.8 MG/DL (ref 8.5–10.1)
CHLORIDE SERPL-SCNC: 104 MMOL/L (ref 94–109)
CO2 SERPL-SCNC: 28 MMOL/L (ref 20–32)
COLOR UR AUTO: YELLOW
CREAT SERPL-MCNC: 1.51 MG/DL (ref 0.52–1.04)
DIFFERENTIAL METHOD BLD: ABNORMAL
EOSINOPHIL # BLD AUTO: 0.2 10E9/L (ref 0–0.7)
EOSINOPHIL NFR BLD AUTO: 2.9 %
ERYTHROCYTE [DISTWIDTH] IN BLOOD BY AUTOMATED COUNT: 15 % (ref 10–15)
GFR SERPL CREATININE-BSD FRML MDRD: 36 ML/MIN/1.7M2
GLUCOSE SERPL-MCNC: 77 MG/DL (ref 70–99)
GLUCOSE UR STRIP-MCNC: NEGATIVE MG/DL
HCT VFR BLD AUTO: 41.6 % (ref 35–47)
HGB BLD-MCNC: 13 G/DL (ref 11.7–15.7)
HGB UR QL STRIP: ABNORMAL
IMM GRANULOCYTES # BLD: 0 10E9/L (ref 0–0.4)
IMM GRANULOCYTES NFR BLD: 0.4 %
KETONES UR STRIP-MCNC: NEGATIVE MG/DL
LEUKOCYTE ESTERASE UR QL STRIP: NEGATIVE
LYMPHOCYTES # BLD AUTO: 1.2 10E9/L (ref 0.8–5.3)
LYMPHOCYTES NFR BLD AUTO: 22.9 %
MCH RBC QN AUTO: 28.9 PG (ref 26.5–33)
MCHC RBC AUTO-ENTMCNC: 31.3 G/DL (ref 31.5–36.5)
MCV RBC AUTO: 92 FL (ref 78–100)
MONOCYTES # BLD AUTO: 0.3 10E9/L (ref 0–1.3)
MONOCYTES NFR BLD AUTO: 6 %
MUCOUS THREADS #/AREA URNS LPF: PRESENT /LPF
NEUTROPHILS # BLD AUTO: 3.5 10E9/L (ref 1.6–8.3)
NEUTROPHILS NFR BLD AUTO: 67.2 %
NITRATE UR QL: NEGATIVE
NRBC # BLD AUTO: 0 10*3/UL
NRBC BLD AUTO-RTO: 0 /100
PH UR STRIP: 6 PH (ref 5–7)
PLATELET # BLD AUTO: 265 10E9/L (ref 150–450)
POTASSIUM SERPL-SCNC: 3.6 MMOL/L (ref 3.4–5.3)
RBC # BLD AUTO: 4.5 10E12/L (ref 3.8–5.2)
RBC #/AREA URNS AUTO: 38 /HPF (ref 0–2)
SODIUM SERPL-SCNC: 136 MMOL/L (ref 133–144)
SOURCE: ABNORMAL
SP GR UR STRIP: 1.01 (ref 1–1.03)
UROBILINOGEN UR STRIP-MCNC: 0 MG/DL (ref 0–2)
WBC # BLD AUTO: 5.2 10E9/L (ref 4–11)
WBC #/AREA URNS AUTO: 5 /HPF (ref 0–5)

## 2018-10-14 PROCEDURE — 74176 CT ABD & PELVIS W/O CONTRAST: CPT

## 2018-10-14 PROCEDURE — 99285 EMERGENCY DEPT VISIT HI MDM: CPT | Mod: 25

## 2018-10-14 PROCEDURE — 96374 THER/PROPH/DIAG INJ IV PUSH: CPT

## 2018-10-14 PROCEDURE — 96361 HYDRATE IV INFUSION ADD-ON: CPT

## 2018-10-14 PROCEDURE — 85025 COMPLETE CBC W/AUTO DIFF WBC: CPT | Performed by: EMERGENCY MEDICINE

## 2018-10-14 PROCEDURE — 80048 BASIC METABOLIC PNL TOTAL CA: CPT | Performed by: EMERGENCY MEDICINE

## 2018-10-14 PROCEDURE — 25000128 H RX IP 250 OP 636: Performed by: EMERGENCY MEDICINE

## 2018-10-14 PROCEDURE — 81001 URINALYSIS AUTO W/SCOPE: CPT | Performed by: EMERGENCY MEDICINE

## 2018-10-14 RX ORDER — HYDROMORPHONE HYDROCHLORIDE 1 MG/ML
.5-1 INJECTION, SOLUTION INTRAMUSCULAR; INTRAVENOUS; SUBCUTANEOUS
Status: DISCONTINUED | OUTPATIENT
Start: 2018-10-14 | End: 2018-10-15 | Stop reason: HOSPADM

## 2018-10-14 RX ADMIN — SODIUM CHLORIDE 1000 ML: 9 INJECTION, SOLUTION INTRAVENOUS at 21:45

## 2018-10-14 RX ADMIN — Medication 0.5 MG: at 22:03

## 2018-10-14 ASSESSMENT — ENCOUNTER SYMPTOMS
DYSURIA: 0
VOMITING: 0
FLANK PAIN: 1
HEMATURIA: 0
NAUSEA: 1

## 2018-10-14 NOTE — ED AVS SNAPSHOT
[facility] Department    [address]    [facility] [address]    Phone:  [phone]    Fax:  [phone]                                       Faby Yeung   MRN: [id number]    Department:  [facility] Emergency Department   Date of Visit:  10/14/2018           Patient Information     Date Of Birth          1965        Your diagnoses for this visit were:     Flank pain     Renal insufficiency        You were seen by Salvador Easley MD.      Follow-up Information     Follow up with Clinic, [facility] In 3 days.    Why:  for a recheck of your kidney function, drink 6 eight ounce glasses of water every day for the next 3 days.     Contact information:    [address] [place] [id number] [phone]        Discharge References/Attachments     FLANK PAIN, UNCERTAIN CAUSE (ENGLISH)      24 Hour Appointment Hotline       To make an appointment at any [facility], call [phone] ([phone]). If you don't have a family doctor or clinic, we will help you find one. [facility] clinics are conveniently located to serve the needs of you and your family.             Review of your medicines      Our records show that you are taking the medicines listed below. If these are incorrect, please call your family doctor or clinic.        Dose / Directions Last dose taken    GABAPENTIN PO        Refills:  0        LORazepam 1 MG tablet   Commonly known as:  ATIVAN   Dose:  1 mg        Take 1 mg by mouth 3 times daily as needed for anxiety   Refills:  0        oxyCODONE-acetaminophen  MG per tablet   Commonly known as:  PERCOCET   Dose:  1-2 tablet   Quantity:  32 tablet        Take 1-2 tablets by mouth every 6 hours as needed for moderate to severe pain   Refills:  0        PROTONIX PO        Refills:  0        SERTRALINE HCL PO   Dose:  150 mg        Take 150 mg by mouth daily   Refills:  0        sodium citrate/citric acid 500-334  MG/5ML Soln solution   Dose:  30 mL        Take 30 mLs by mouth 3 times daily   Refills:  0        SYNTHROID 150 MCG tablet   Dose:  150 mcg   Generic drug:  levothyroxine        Take 150 mcg by mouth daily   Refills:  0        tiZANidine 2 MG tablet   Commonly known as:  ZANAFLEX   Dose:  2 mg   Quantity:  20 tablet        Take 1 tablet (2 mg) by mouth 3 times daily   Refills:  0                Procedures and tests performed during your visit     Basic metabolic panel    CBC with platelets differential    CT Abdomen Pelvis w/o Contrast    Peripheral IV catheter    UA with Microscopic      Orders Needing Specimen Collection     None      Pending Results     No orders found from 10/12/2018 to 10/15/2018.            Pending Culture Results     No orders found from 10/12/2018 to 10/15/2018.            Pending Results Instructions     If you had any lab results that were not finalized at the time of your Discharge, you can call the ED Lab Result RN at [phone]. You will be contacted by this team for any positive Lab results or changes in treatment. The nurses are available 7 days a week from 10A to 6:30P.  You can leave a message 24 hours per day and they will return your call.        Test Results From Your Hospital Stay        10/14/2018  9:47 PM      Component Results     Component Value Ref Range & Units Status    Color Urine Yellow  Final    Appearance Urine Clear  Final    Glucose Urine Negative NEG^Negative mg/dL Final    Bilirubin Urine Negative NEG^Negative Final    Ketones Urine Negative NEG^Negative mg/dL Final    Specific Gravity Urine 1.015 1.003 - 1.035 Final    Blood Urine Moderate (A) NEG^Negative Final    pH Urine 6.0 5.0 - 7.0 pH Final    Protein Albumin Urine 30 (A) NEG^Negative mg/dL Final    Urobilinogen mg/dL 0.0 0.0 - 2.0 mg/dL Final    Nitrite Urine Negative NEG^Negative Final    Leukocyte Esterase Urine Negative NEG^Negative Final    Source Midstream Urine  Final    WBC Urine 5 0 - 5 /HPF  Final    RBC Urine 38 (H) 0 - 2 /HPF Final    Mucous Urine Present (A) NEG^Negative /LPF Final         10/14/2018  9:53 PM      Component Results     Component Value Ref Range & Units Status    WBC 5.2 4.0 - 11.0 10e9/L Final    RBC Count 4.50 3.8 - 5.2 10e12/L Final    Hemoglobin 13.0 11.7 - 15.7 g/dL Final    Hematocrit 41.6 35.0 - 47.0 % Final    MCV 92 78 - 100 fl Final    MCH 28.9 26.5 - 33.0 pg Final    MCHC 31.3 (L) 31.5 - 36.5 g/dL Final    RDW 15.0 10.0 - 15.0 % Final    Platelet Count 265 150 - 450 10e9/L Final    Diff Method Automated Method  Final    % Neutrophils 67.2 % Final    % Lymphocytes 22.9 % Final    % Monocytes 6.0 % Final    % Eosinophils 2.9 % Final    % Basophils 0.6 % Final    % Immature Granulocytes 0.4 % Final    Nucleated RBCs 0 0 /100 Final    Absolute Neutrophil 3.5 1.6 - 8.3 10e9/L Final    Absolute Lymphocytes 1.2 0.8 - 5.3 10e9/L Final    Absolute Monocytes 0.3 0.0 - 1.3 10e9/L Final    Absolute Eosinophils 0.2 0.0 - 0.7 10e9/L Final    Absolute Basophils 0.0 0.0 - 0.2 10e9/L Final    Abs Immature Granulocytes 0.0 0 - 0.4 10e9/L Final    Absolute Nucleated RBC 0.0  Final         10/14/2018 10:07 PM      Component Results     Component Value Ref Range & Units Status    Sodium 136 133 - 144 mmol/L Final    Potassium 3.6 3.4 - 5.3 mmol/L Final    Chloride 104 94 - 109 mmol/L Final    Carbon Dioxide 28 20 - 32 mmol/L Final    Anion Gap 4 3 - 14 mmol/L Final    Glucose 77 70 - 99 mg/dL Final    Urea Nitrogen 21 7 - 30 mg/dL Final    Creatinine 1.51 (H) 0.52 - 1.04 mg/dL Final    GFR Estimate 36 (L) >60 mL/min/1.7m2 Final    Non  GFR Calc    GFR Estimate If Black 43 (L) >60 mL/min/1.7m2 Final    African American GFR Calc    Calcium 8.8 8.5 - 10.1 mg/dL Final         10/14/2018 10:52 PM      Narrative     CT ABDOMEN PELVIS WITHOUT CONTRAST   10/14/2018 10:21 PM     HISTORY: Left flank pain, evaluate for diverticulitis, colitis,  ureterolithiases, mass.    TECHNIQUE:  Noncontrast CT abdomen and pelvis was performed. Radiation  dose for this scan was reduced using automated exposure control,  adjustment of the mA and/or kV according to patient size, or iterative  reconstruction technique.    COMPARISON: CT abdomen and pelvis 9/8/2016.    FINDINGS:  Bilateral nephrocalcinosis again identified consistent with  medullary sponge kidney. Suggestion of a small cyst at the lower left  kidney image 30. No hydronephrosis or ureter stone. No bowel  obstruction. No acute inflammatory change of the bowel. No free fluid  or free air. Cholelithiasis. Unenhanced liver, spleen, adrenals,  pancreas show no acute abnormalities.        Impression     IMPRESSION:  1. No acute abnormality is seen.  2. Bilateral nephrocalcinosis of kidneys consistent with medullary  sponge kidney.  3. Cholelithiasis.      KIMBER HOPPER MD                Clinical Quality Measure: Blood Pressure Screening     Your blood pressure was checked while you were in the emergency department today. The last reading we obtained was  BP: (!) 155/101 . Please read the guidelines below about what these numbers mean and what you should do about them.  If your systolic blood pressure (the top number) is less than 120 and your diastolic blood pressure (the bottom number) is less than 80, then your blood pressure is normal. There is nothing more that you need to do about it.  If your systolic blood pressure (the top number) is 120-139 or your diastolic blood pressure (the bottom number) is 80-89, your blood pressure may be higher than it should be. You should have your blood pressure rechecked within a year by a primary care provider.  If your systolic blood pressure (the top number) is 140 or greater or your diastolic blood pressure (the bottom number) is 90 or greater, you may have high blood pressure. High blood pressure is treatable, but if left untreated over time it can put you at risk for heart attack, stroke, or kidney failure. You  "should have your blood pressure rechecked by a primary care provider within the next 4 weeks.  If your provider in the emergency department today gave you specific instructions to follow-up with your doctor or provider even sooner than that, you should follow that instruction and not wait for up to 4 weeks for your follow-up visit.        Thank you for choosing [facility]       Thank you for choosing [facility] for your care. Our goal is always to provide you with excellent care. Hearing back from our patients is one way we can continue to improve our services. Please take a few minutes to complete the written survey that you may receive in the mail after you visit with us. Thank you!        [employer] Information     [employer] lets you send messages to your doctor, view your test results, renew your prescriptions, schedule appointments and more. To sign up, go to [email or web address] . Click on \"Log in\" on the left side of the screen, which will take you to the Welcome page. Then click on \"Sign up Now\" on the right side of the page.     You will be asked to enter the access code listed below, as well as some personal information. Please follow the directions to create your username and password.     Your access code is: [email or web address]  Expires: 2019 11:37 PM     Your access code will  in 90 days. If you need help or a new code, please call your [facility] clinic or [phone].        Care EveryWhere ID     This is your Care EveryWhere ID. This could be used by other organizations to access your [facility] medical records  [id number]        Equal Access to Services     SYDNEY CATALAN : Hadii danie craft hadasho Sosarahali, waaxda luqadaha, qaybta kaalmada adeegyada, aysha howe . So [facility] [phone].    ATENCIÓN: Si habla español, tiene a bradley disposición servicios gratuitos de asistencia lingüística. Llame al [phone].    We comply with applicable federal civil rights laws and [place] " laws. We do not discriminate on the basis of race, color, national origin, age, disability, sex, sexual orientation, or gender identity.            After Visit Summary       This is your record. Keep this with you and show to your community pharmacist(s) and doctor(s) at your next visit.

## 2018-10-15 NOTE — ED TRIAGE NOTES
Patient presents to ED due to L flank pain. Hx kidney stones    Pain developed this evening c/o nausea

## 2018-10-15 NOTE — ED PROVIDER NOTES
"  History     Chief Complaint:  Flank Pain    HPI   Faby Yeung is a 53 year old female with a history of kidney stones, who presents to the ED for the evaluation of flank pain. The patient reports that she started to experience left flank pain earlier this morning, prompting her to the ED. The patient notes that for the past few days she has also been experiencing nausea. The patient also notes that the pain she is experiencing is similar to previous kidney stone pain that she has experienced. The patient denies dysuria, hematuria, and vomiting.    Allergies:  Penicillins  Ibuprofen sodium  Levaquin  Sulfa Drugs     Medications:    Gabapentin  Synthroid  Ativan  Percocet  Protonix  Sertraline  Bicitra  Zanaflex    Past Medical History:    Anxiety  Depression  Gastro-oesophageal reflux disease  Blood clots  Leukocytopenia  Nephrolithiasis  Raynaud's syndrome  Scoliosis  Gastritis  Hypothyroidism  UTI  Kidney stone    Past Surgical History:    History reviewed. No pertinent surgical history.    Family History:    History reviewed. No pertinent family history.     Social History:  Smoking status: Former Smoker  Alcohol use: Yes  Marital Status:  Single [1]     Review of Systems   Gastrointestinal: Positive for nausea. Negative for vomiting.   Genitourinary: Positive for flank pain. Negative for dysuria and hematuria.   All other systems reviewed and are negative.    Physical Exam   Patient Vitals for the past 24 hrs:   BP Temp Heart Rate Resp SpO2 Height Weight   10/14/18 2109 (!) 155/101 97  F (36.1  C) 90 18 97 % 1.676 m (5' 6\") 79.4 kg (175 lb)       Physical Exam  General: Patient is alert and interactive when I enter the room  Head:  The scalp, face, and head appear normal  Eyes:  The pupils are equal, round, and reactive to light    Conjunctivae and sclerae are normal  ENT:    External acoustic canals are normal    The oropharynx is normal without erythema.     Uvula is in the midline  Neck:  Normal " range of motion  CV:  Regular rate. S1/S2. No murmurs.   Resp:  Lungs are clear without wheezes or rales. No distress  GI:  Abdomen is soft, no rigidity, guarding, or rebound    No distension. Left CVA tenderness, left lower quadrant.     MS:  Normal tone. Joints grossly normal without effusions.     No asymmetric leg swelling, calf or thigh tenderness.      Normal motor assessment of all extremities.  Skin:  No rash or lesions noted. Normal capillary refill noted  Neuro: Speech is normal and fluent. Face is symmetric.     Moving all extremities well.   Psych:  Awake. Alert.  Normal affect.  Appropriate interactions.  Lymph: No anterior cervical lymphadenopathy noted    Emergency Department Course   Imaging:  Radiographic findings were communicated with the patient who voiced understanding of the findings.  CT Abdomen Pelvis w/o Contrast  IMPRESSION:  1. No acute abnormality is seen.  2. Bilateral nephrocalcinosis of kidneys consistent with medullary  sponge kidney.  3. Cholelithiasis.    As read by Radiology.    Laboratory:  UA: BLOO Moderate, Protein Albumin 30, RBC 38 (H), Mucous Present, o/w Negative  CBC: WNL (WBC 5.2, HGB 13.0, )  BMP: GFR 36 (L), WNL Creatinine 1.51 (H)    Interventions:  Medications   HYDROmorphone (PF) (DILAUDID) injection 0.5-1 mg (0.5 mg Intravenous Given 10/14/18 2203)   0.9% sodium chloride BOLUS (1,000 mLs Intravenous New Bag 10/14/18 2145)     2145, NS 1L IV Bolus  2203, dilaudid, 0.5 mg, IV      Emergency Department Course:  Past medical records, nursing notes, and vitals reviewed.  2141: I performed an exam of the patient and obtained history, as documented above.    IV inserted and blood drawn.    The patient was sent for an abdominal CT while in the emergency department, findings above.    2251: I rechecked the patient. Explained findings to patient.     I rechecked the patient.  Findings and plan explained to the Patient. Patient discharged home with instructions regarding  supportive care, medications, and reasons to return. The importance of close follow-up was reviewed.     Impression & Plan    Medical Decision Making:  Faby Yeung is a 53 year old female who presents with flank pain.  Associated symptoms include nausea.   A broad differential diagnosis was considered including diverticulitis, ureterolithiasis, tumor, colitis, cholecystitis, aneurysm, dissection, hydronephrosis, pneumonia, rib fracture, UTI, pyelonephritis amongst many other etiologies.  I doubt PE given the patient's previous history of kidney stones and the lack of swelling to extremities.    The workup in the ED is at this point negative.  No etiology for the patients pain is found at this point and my suspicion of an intraabdominal or intrathoracic catastrophe or other worrisome etiology is very low.  I will not therefore admit for serial exams and further workup.  Patient is hemodynamically stable in ED. Plan is home with flank pain recheck by primary care physician or return to ED at that time.  Return for fevers greater than 102, increasing pain, other new symptoms develop.  Flank pain handout given.  Questions were answered. See primary for recheck of sx's and creatinine    Diagnosis:    ICD-10-CM    1. Flank pain R10.9    2. Renal insufficiency N28.9        Disposition:  discharged to home. Primary follow up in 3 days for creatinine recheck.    Discharge Medications:  Discharge Medication List as of 10/14/2018 11:38 PM            Roge Hernandez  10/14/2018   [facility] EMERGENCY DEPARTMENT  Scribe Disclosure:  I, Roge Hernandez, am serving as a scribe at 9:41 PM on 10/14/2018 to document services personally performed by Salvador Easley MD based on my observations and the provider's statements to me.      Salvador Easley MD  10/14/18 1032

## 2019-10-15 ENCOUNTER — AMBULATORY - HEALTHEAST (OUTPATIENT)
Dept: UROLOGY | Facility: CLINIC | Age: 54
End: 2019-10-15

## 2019-10-15 ENCOUNTER — OFFICE VISIT - HEALTHEAST (OUTPATIENT)
Dept: UROLOGY | Facility: CLINIC | Age: 54
End: 2019-10-15

## 2019-10-15 ENCOUNTER — HOSPITAL ENCOUNTER (OUTPATIENT)
Dept: CT IMAGING | Facility: CLINIC | Age: 54
Discharge: HOME | End: 2019-10-15
Attending: PHYSICIAN ASSISTANT

## 2019-10-15 DIAGNOSIS — R10.9 FLANK PAIN: ICD-10-CM

## 2019-10-15 DIAGNOSIS — Z87.442 HISTORY OF KIDNEY STONES: ICD-10-CM

## 2019-10-15 DIAGNOSIS — N12 PYELONEPHRITIS: ICD-10-CM

## 2019-10-15 LAB
ALBUMIN SERPL-MCNC: 4.2 G/DL (ref 3.5–5)
ALBUMIN UR-MCNC: ABNORMAL MG/DL
ANION GAP SERPL CALCULATED.3IONS-SCNC: 5 MMOL/L (ref 5–18)
APPEARANCE UR: ABNORMAL
BASOPHILS # BLD AUTO: 0 THOU/UL (ref 0–0.2)
BASOPHILS NFR BLD AUTO: 0 % (ref 0–2)
BILIRUB UR QL STRIP: NEGATIVE
BUN SERPL-MCNC: 26 MG/DL (ref 8–22)
C REACTIVE PROTEIN LHE: 0.7 MG/DL (ref 0–0.8)
CALCIUM SERPL-MCNC: 10 MG/DL (ref 8.5–10.5)
CHLORIDE BLD-SCNC: 103 MMOL/L (ref 98–107)
CO2 SERPL-SCNC: 28 MMOL/L (ref 22–31)
COLOR UR AUTO: YELLOW
CREAT SERPL-MCNC: 1.36 MG/DL (ref 0.6–1.1)
EOSINOPHIL # BLD AUTO: 0.1 THOU/UL (ref 0–0.4)
EOSINOPHIL NFR BLD AUTO: 2 % (ref 0–6)
ERYTHROCYTE [DISTWIDTH] IN BLOOD BY AUTOMATED COUNT: 15.9 % (ref 11–14.5)
GFR SERPL CREATININE-BSD FRML MDRD: 40 ML/MIN/1.73M2
GLUCOSE BLD-MCNC: 93 MG/DL (ref 70–125)
GLUCOSE UR STRIP-MCNC: NEGATIVE MG/DL
HCT VFR BLD AUTO: 40.8 % (ref 35–47)
HGB BLD-MCNC: 12.5 G/DL (ref 12–16)
HGB UR QL STRIP: ABNORMAL
KETONES UR STRIP-MCNC: NEGATIVE MG/DL
LEUKOCYTE ESTERASE UR QL STRIP: ABNORMAL
LYMPHOCYTES # BLD AUTO: 1.3 THOU/UL (ref 0.8–4.4)
LYMPHOCYTES NFR BLD AUTO: 24 % (ref 20–40)
MCH RBC QN AUTO: 28.2 PG (ref 27–34)
MCHC RBC AUTO-ENTMCNC: 30.6 G/DL (ref 32–36)
MCV RBC AUTO: 92 FL (ref 80–100)
MONOCYTES # BLD AUTO: 0.5 THOU/UL (ref 0–0.9)
MONOCYTES NFR BLD AUTO: 9 % (ref 2–10)
NEUTROPHILS # BLD AUTO: 3.4 THOU/UL (ref 2–7.7)
NEUTROPHILS NFR BLD AUTO: 64 % (ref 50–70)
NITRATE UR QL: POSITIVE
PH UR STRIP: 7 [PH] (ref 5–8)
PHOSPHATE SERPL-MCNC: 2.7 MG/DL (ref 2.5–4.5)
PLATELET # BLD AUTO: 298 THOU/UL (ref 140–440)
PMV BLD AUTO: 9.8 FL (ref 8.5–12.5)
POTASSIUM BLD-SCNC: 5 MMOL/L (ref 3.5–5)
RBC # BLD AUTO: 4.44 MILL/UL (ref 3.8–5.4)
SODIUM SERPL-SCNC: 136 MMOL/L (ref 136–145)
SP GR UR STRIP: 1.02 (ref 1–1.03)
UROBILINOGEN UR STRIP-ACNC: ABNORMAL
WBC: 5.3 THOU/UL (ref 4–11)

## 2019-10-17 ENCOUNTER — COMMUNICATION - HEALTHEAST (OUTPATIENT)
Dept: UROLOGY | Facility: CLINIC | Age: 54
End: 2019-10-17

## 2019-10-17 DIAGNOSIS — N12 PYELONEPHRITIS: ICD-10-CM

## 2019-10-17 LAB — BACTERIA SPEC CULT: ABNORMAL

## 2019-10-31 ENCOUNTER — COMMUNICATION - HEALTHEAST (OUTPATIENT)
Dept: UROLOGY | Facility: CLINIC | Age: 54
End: 2019-10-31

## 2019-11-01 ENCOUNTER — MEDICAL CORRESPONDENCE (OUTPATIENT)
Dept: HEALTH INFORMATION MANAGEMENT | Facility: CLINIC | Age: 54
End: 2019-11-01

## 2019-11-01 ENCOUNTER — COMMUNICATION - HEALTHEAST (OUTPATIENT)
Dept: UROLOGY | Facility: CLINIC | Age: 54
End: 2019-11-01

## 2019-11-01 ENCOUNTER — AMBULATORY - HEALTHEAST (OUTPATIENT)
Dept: UROLOGY | Facility: CLINIC | Age: 54
End: 2019-11-01

## 2019-11-01 DIAGNOSIS — R30.0 DYSURIA: ICD-10-CM

## 2019-11-06 ENCOUNTER — COMMUNICATION - HEALTHEAST (OUTPATIENT)
Dept: UROLOGY | Facility: CLINIC | Age: 54
End: 2019-11-06

## 2019-11-06 DIAGNOSIS — R30.0 DYSURIA: Primary | ICD-10-CM

## 2019-11-13 DIAGNOSIS — R30.0 DYSURIA: ICD-10-CM

## 2019-11-13 PROCEDURE — 87186 SC STD MICRODIL/AGAR DIL: CPT | Performed by: PHYSICIAN ASSISTANT

## 2019-11-13 PROCEDURE — 87086 URINE CULTURE/COLONY COUNT: CPT | Performed by: PHYSICIAN ASSISTANT

## 2019-11-13 PROCEDURE — 87088 URINE BACTERIA CULTURE: CPT | Performed by: PHYSICIAN ASSISTANT

## 2019-11-15 LAB
BACTERIA SPEC CULT: ABNORMAL
SPECIMEN SOURCE: ABNORMAL

## 2019-11-25 ENCOUNTER — COMMUNICATION - HEALTHEAST (OUTPATIENT)
Dept: UROLOGY | Facility: CLINIC | Age: 54
End: 2019-11-25

## 2019-11-26 ENCOUNTER — OFFICE VISIT - HEALTHEAST (OUTPATIENT)
Dept: UROLOGY | Facility: CLINIC | Age: 54
End: 2019-11-26

## 2019-11-26 DIAGNOSIS — R30.0 DYSURIA: ICD-10-CM

## 2019-11-26 DIAGNOSIS — Z87.440 HISTORY OF UTI: ICD-10-CM

## 2019-11-26 DIAGNOSIS — N39.0 URINARY TRACT INFECTION WITH PYURIA: ICD-10-CM

## 2019-11-26 LAB
ALBUMIN UR-MCNC: ABNORMAL MG/DL
APPEARANCE UR: ABNORMAL
BILIRUB UR QL STRIP: ABNORMAL
COLOR UR AUTO: ABNORMAL
GLUCOSE UR STRIP-MCNC: NEGATIVE MG/DL
HGB UR QL STRIP: ABNORMAL
KETONES UR STRIP-MCNC: NEGATIVE MG/DL
LEUKOCYTE ESTERASE UR QL STRIP: ABNORMAL
NITRATE UR QL: POSITIVE
PH UR STRIP: 7 [PH] (ref 5–8)
SP GR UR STRIP: 1.02 (ref 1–1.03)
UROBILINOGEN UR STRIP-ACNC: ABNORMAL

## 2019-11-28 LAB — BACTERIA SPEC CULT: ABNORMAL

## 2020-04-06 ENCOUNTER — COMMUNICATION - HEALTHEAST (OUTPATIENT)
Dept: UROLOGY | Facility: CLINIC | Age: 55
End: 2020-04-06

## 2020-04-06 ENCOUNTER — AMBULATORY - HEALTHEAST (OUTPATIENT)
Dept: UROLOGY | Facility: CLINIC | Age: 55
End: 2020-04-06

## 2020-04-06 DIAGNOSIS — Z87.440 HISTORY OF UTI: ICD-10-CM

## 2020-04-06 DIAGNOSIS — R31.9 HEMATURIA: ICD-10-CM

## 2020-04-06 DIAGNOSIS — R30.0 DYSURIA: ICD-10-CM

## 2020-04-06 DIAGNOSIS — N39.0 URINARY TRACT INFECTION WITH PYURIA: ICD-10-CM

## 2020-04-07 DIAGNOSIS — R30.0 DYSURIA: Primary | ICD-10-CM

## 2020-05-14 ENCOUNTER — HOSPITAL ENCOUNTER (OUTPATIENT)
Dept: LAB | Facility: CLINIC | Age: 55
Discharge: HOME | End: 2020-05-14
Attending: PHYSICIAN ASSISTANT | Admitting: PHYSICIAN ASSISTANT
Payer: COMMERCIAL

## 2020-05-14 DIAGNOSIS — R30.0 DYSURIA: ICD-10-CM

## 2020-05-14 PROCEDURE — 87086 URINE CULTURE/COLONY COUNT: CPT | Performed by: PHYSICIAN ASSISTANT

## 2020-05-14 PROCEDURE — 87088 URINE BACTERIA CULTURE: CPT | Performed by: PHYSICIAN ASSISTANT

## 2020-05-14 PROCEDURE — 87186 SC STD MICRODIL/AGAR DIL: CPT | Performed by: PHYSICIAN ASSISTANT

## 2020-05-16 LAB
BACTERIA SPEC CULT: ABNORMAL
BACTERIA SPEC CULT: ABNORMAL
Lab: ABNORMAL
SPECIMEN SOURCE: ABNORMAL

## 2020-05-19 ENCOUNTER — COMMUNICATION - HEALTHEAST (OUTPATIENT)
Dept: UROLOGY | Facility: CLINIC | Age: 55
End: 2020-05-19

## 2020-05-19 DIAGNOSIS — N39.0 UTI (URINARY TRACT INFECTION): ICD-10-CM

## 2020-05-19 DIAGNOSIS — Z87.442 HISTORY OF KIDNEY STONES: ICD-10-CM

## 2020-05-19 DIAGNOSIS — R10.9 FLANK PAIN: ICD-10-CM

## 2021-02-25 ENCOUNTER — APPOINTMENT (OUTPATIENT)
Dept: CT IMAGING | Facility: CLINIC | Age: 56
End: 2021-02-25
Attending: EMERGENCY MEDICINE
Payer: COMMERCIAL

## 2021-02-25 ENCOUNTER — HOSPITAL ENCOUNTER (OUTPATIENT)
Facility: CLINIC | Age: 56
Setting detail: OBSERVATION
Discharge: HOME | End: 2021-02-28
Attending: EMERGENCY MEDICINE | Admitting: INTERNAL MEDICINE
Payer: COMMERCIAL

## 2021-02-25 DIAGNOSIS — N18.9 ACUTE ON CHRONIC RENAL INSUFFICIENCY: ICD-10-CM

## 2021-02-25 DIAGNOSIS — E03.9 MYXEDEMA: ICD-10-CM

## 2021-02-25 DIAGNOSIS — E03.9 HYPOTHYROIDISM, UNSPECIFIED TYPE: ICD-10-CM

## 2021-02-25 DIAGNOSIS — N12 PYELONEPHRITIS: ICD-10-CM

## 2021-02-25 DIAGNOSIS — N28.9 ACUTE ON CHRONIC RENAL INSUFFICIENCY: ICD-10-CM

## 2021-02-25 PROBLEM — Q61.5 MEDULLARY SPONGE KIDNEY: Status: ACTIVE | Noted: 2021-02-25

## 2021-02-25 LAB
ABO + RH BLD: ABNORMAL
ABO + RH BLD: ABNORMAL
ALBUMIN SERPL-MCNC: 4 G/DL (ref 3.4–5)
ALBUMIN UR-MCNC: 70 MG/DL
ALP SERPL-CCNC: 77 U/L (ref 40–150)
ALT SERPL W P-5'-P-CCNC: 33 U/L (ref 0–50)
ANION GAP SERPL CALCULATED.3IONS-SCNC: 6 MMOL/L (ref 3–14)
APPEARANCE UR: ABNORMAL
AST SERPL W P-5'-P-CCNC: 76 U/L (ref 0–45)
BACTERIA #/AREA URNS HPF: ABNORMAL /HPF
BASOPHILS # BLD AUTO: 0 10E9/L (ref 0–0.2)
BASOPHILS NFR BLD AUTO: 0.9 %
BILIRUB SERPL-MCNC: 0.4 MG/DL (ref 0.2–1.3)
BILIRUB UR QL STRIP: NEGATIVE
BLD GP AB SCN SERPL QL: ABNORMAL
BLD PROD TYP BPU: ABNORMAL
BLOOD BANK CMNT PATIENT-IMP: ABNORMAL
BUN SERPL-MCNC: 28 MG/DL (ref 7–30)
CALCIUM SERPL-MCNC: 9.3 MG/DL (ref 8.5–10.1)
CHLORIDE SERPL-SCNC: 103 MMOL/L (ref 94–109)
CO2 SERPL-SCNC: 28 MMOL/L (ref 20–32)
COLOR UR AUTO: ABNORMAL
CREAT SERPL-MCNC: 1.64 MG/DL (ref 0.52–1.04)
DIFFERENTIAL METHOD BLD: ABNORMAL
EOSINOPHIL # BLD AUTO: 0.2 10E9/L (ref 0–0.7)
EOSINOPHIL NFR BLD AUTO: 3.3 %
ERYTHROCYTE [DISTWIDTH] IN BLOOD BY AUTOMATED COUNT: 13.7 % (ref 10–15)
GFR SERPL CREATININE-BSD FRML MDRD: 35 ML/MIN/{1.73_M2}
GLUCOSE SERPL-MCNC: 75 MG/DL (ref 70–99)
GLUCOSE UR STRIP-MCNC: NEGATIVE MG/DL
HCT VFR BLD AUTO: 32.9 % (ref 35–47)
HGB BLD-MCNC: 9.7 G/DL (ref 11.7–15.7)
HGB UR QL STRIP: ABNORMAL
IMM GRANULOCYTES # BLD: 0 10E9/L (ref 0–0.4)
IMM GRANULOCYTES NFR BLD: 0.7 %
INR PPP: 0.93 (ref 0.86–1.14)
KETONES UR STRIP-MCNC: NEGATIVE MG/DL
LABORATORY COMMENT REPORT: NORMAL
LACTATE BLD-SCNC: 0.6 MMOL/L (ref 0.7–2)
LEUKOCYTE ESTERASE UR QL STRIP: ABNORMAL
LYMPHOCYTES # BLD AUTO: 1.2 10E9/L (ref 0.8–5.3)
LYMPHOCYTES NFR BLD AUTO: 27.5 %
MCH RBC QN AUTO: 28.6 PG (ref 26.5–33)
MCHC RBC AUTO-ENTMCNC: 29.5 G/DL (ref 31.5–36.5)
MCV RBC AUTO: 97 FL (ref 78–100)
MONOCYTES # BLD AUTO: 0.3 10E9/L (ref 0–1.3)
MONOCYTES NFR BLD AUTO: 7.4 %
NEUTROPHILS # BLD AUTO: 2.7 10E9/L (ref 1.6–8.3)
NEUTROPHILS NFR BLD AUTO: 60.2 %
NITRATE UR QL: POSITIVE
NRBC # BLD AUTO: 0 10*3/UL
NRBC BLD AUTO-RTO: 0 /100
NUM BPU REQUESTED: 1
PH UR STRIP: 6.5 PH (ref 5–7)
PLATELET # BLD AUTO: 253 10E9/L (ref 150–450)
POTASSIUM SERPL-SCNC: 3.9 MMOL/L (ref 3.4–5.3)
PROT SERPL-MCNC: 8.3 G/DL (ref 6.8–8.8)
RBC # BLD AUTO: 3.39 10E12/L (ref 3.8–5.2)
RBC #/AREA URNS AUTO: >182 /HPF (ref 0–2)
SARS-COV-2 RNA RESP QL NAA+PROBE: NEGATIVE
SODIUM SERPL-SCNC: 137 MMOL/L (ref 133–144)
SOURCE: ABNORMAL
SP GR UR STRIP: 1.01 (ref 1–1.03)
SPECIMEN EXP DATE BLD: ABNORMAL
SPECIMEN SOURCE: NORMAL
SQUAMOUS #/AREA URNS AUTO: 2 /HPF (ref 0–1)
T4 FREE SERPL-MCNC: 0.16 NG/DL (ref 0.76–1.46)
TSH SERPL DL<=0.005 MIU/L-ACNC: 186.62 MU/L (ref 0.4–4)
UROBILINOGEN UR STRIP-MCNC: NORMAL MG/DL (ref 0–2)
WBC # BLD AUTO: 4.5 10E9/L (ref 4–11)
WBC #/AREA URNS AUTO: >182 /HPF (ref 0–5)

## 2021-02-25 PROCEDURE — 250N000009 HC RX 250: Performed by: HOSPITALIST

## 2021-02-25 PROCEDURE — 87635 SARS-COV-2 COVID-19 AMP PRB: CPT | Performed by: EMERGENCY MEDICINE

## 2021-02-25 PROCEDURE — 96375 TX/PRO/DX INJ NEW DRUG ADDON: CPT

## 2021-02-25 PROCEDURE — 87088 URINE BACTERIA CULTURE: CPT | Performed by: EMERGENCY MEDICINE

## 2021-02-25 PROCEDURE — 99223 1ST HOSP IP/OBS HIGH 75: CPT | Mod: AI | Performed by: HOSPITALIST

## 2021-02-25 PROCEDURE — 74176 CT ABD & PELVIS W/O CONTRAST: CPT

## 2021-02-25 PROCEDURE — 86922 COMPATIBILITY TEST ANTIGLOB: CPT | Performed by: EMERGENCY MEDICINE

## 2021-02-25 PROCEDURE — 250N000011 HC RX IP 250 OP 636: Performed by: EMERGENCY MEDICINE

## 2021-02-25 PROCEDURE — 86902 BLOOD TYPE ANTIGEN DONOR EA: CPT | Performed by: EMERGENCY MEDICINE

## 2021-02-25 PROCEDURE — 84439 ASSAY OF FREE THYROXINE: CPT | Performed by: EMERGENCY MEDICINE

## 2021-02-25 PROCEDURE — 84443 ASSAY THYROID STIM HORMONE: CPT | Performed by: EMERGENCY MEDICINE

## 2021-02-25 PROCEDURE — 80053 COMPREHEN METABOLIC PANEL: CPT | Performed by: EMERGENCY MEDICINE

## 2021-02-25 PROCEDURE — 87086 URINE CULTURE/COLONY COUNT: CPT | Performed by: EMERGENCY MEDICINE

## 2021-02-25 PROCEDURE — C9803 HOPD COVID-19 SPEC COLLECT: HCPCS

## 2021-02-25 PROCEDURE — 85025 COMPLETE CBC W/AUTO DIFF WBC: CPT | Performed by: EMERGENCY MEDICINE

## 2021-02-25 PROCEDURE — 82533 TOTAL CORTISOL: CPT | Performed by: EMERGENCY MEDICINE

## 2021-02-25 PROCEDURE — 99285 EMERGENCY DEPT VISIT HI MDM: CPT | Mod: 25

## 2021-02-25 PROCEDURE — 85610 PROTHROMBIN TIME: CPT | Performed by: EMERGENCY MEDICINE

## 2021-02-25 PROCEDURE — 96366 THER/PROPH/DIAG IV INF ADDON: CPT

## 2021-02-25 PROCEDURE — 250N000013 HC RX MED GY IP 250 OP 250 PS 637: Performed by: EMERGENCY MEDICINE

## 2021-02-25 PROCEDURE — 96365 THER/PROPH/DIAG IV INF INIT: CPT

## 2021-02-25 PROCEDURE — 81001 URINALYSIS AUTO W/SCOPE: CPT | Performed by: EMERGENCY MEDICINE

## 2021-02-25 PROCEDURE — 83605 ASSAY OF LACTIC ACID: CPT | Performed by: EMERGENCY MEDICINE

## 2021-02-25 PROCEDURE — 86850 RBC ANTIBODY SCREEN: CPT | Performed by: EMERGENCY MEDICINE

## 2021-02-25 PROCEDURE — 86900 BLOOD TYPING SEROLOGIC ABO: CPT | Performed by: EMERGENCY MEDICINE

## 2021-02-25 PROCEDURE — 87186 SC STD MICRODIL/AGAR DIL: CPT | Performed by: EMERGENCY MEDICINE

## 2021-02-25 PROCEDURE — 86901 BLOOD TYPING SEROLOGIC RH(D): CPT | Performed by: EMERGENCY MEDICINE

## 2021-02-25 RX ORDER — CEFTRIAXONE 2 G/1
2 INJECTION, POWDER, FOR SOLUTION INTRAMUSCULAR; INTRAVENOUS ONCE
Status: COMPLETED | OUTPATIENT
Start: 2021-02-25 | End: 2021-02-25

## 2021-02-25 RX ORDER — MORPHINE SULFATE 4 MG/ML
4 INJECTION, SOLUTION INTRAMUSCULAR; INTRAVENOUS
Status: DISCONTINUED | OUTPATIENT
Start: 2021-02-25 | End: 2021-02-27

## 2021-02-25 RX ORDER — ONDANSETRON 2 MG/ML
4 INJECTION INTRAMUSCULAR; INTRAVENOUS EVERY 30 MIN PRN
Status: DISCONTINUED | OUTPATIENT
Start: 2021-02-25 | End: 2021-02-27

## 2021-02-25 RX ORDER — ACETAMINOPHEN 500 MG
1000 TABLET ORAL ONCE
Status: COMPLETED | OUTPATIENT
Start: 2021-02-25 | End: 2021-02-25

## 2021-02-25 RX ORDER — LEVOTHYROXINE SODIUM 20 UG/ML
100 INJECTION, SOLUTION INTRAVENOUS ONCE
Status: COMPLETED | OUTPATIENT
Start: 2021-02-25 | End: 2021-02-25

## 2021-02-25 RX ADMIN — ONDANSETRON 4 MG: 2 INJECTION INTRAMUSCULAR; INTRAVENOUS at 18:39

## 2021-02-25 RX ADMIN — ACETAMINOPHEN 1000 MG: 500 TABLET, FILM COATED ORAL at 18:38

## 2021-02-25 RX ADMIN — MORPHINE SULFATE 4 MG: 4 INJECTION INTRAVENOUS at 18:40

## 2021-02-25 RX ADMIN — LEVOTHYROXINE SODIUM 100 MCG: 20 INJECTION, SOLUTION INTRAVENOUS at 23:46

## 2021-02-25 RX ADMIN — CEFTRIAXONE 2 G: 2 INJECTION, POWDER, FOR SOLUTION INTRAMUSCULAR; INTRAVENOUS at 21:05

## 2021-02-25 ASSESSMENT — ENCOUNTER SYMPTOMS
SHORTNESS OF BREATH: 1
FATIGUE: 1
DYSURIA: 1
BACK PAIN: 1
APPETITE CHANGE: 0
FACIAL SWELLING: 1
FEVER: 0
VOMITING: 0
MYALGIAS: 1
BLOOD IN STOOL: 0
VOICE CHANGE: 1
HEMATURIA: 1

## 2021-02-25 ASSESSMENT — MIFFLIN-ST. JEOR: SCORE: 1423.22

## 2021-02-25 NOTE — ED TRIAGE NOTES
"Blood in urine for 1 year and low back back \"forever.\"  Has a kidney doctor but has not followed up. Hx of medullary kidney disease. States she has no energy.  Unable to get daily prescriptions filled due to her MD retiring. ALso c/o shortness of breath and sore throat and thinks this is due to not taking daily meds for over 1-2 months.   "

## 2021-02-25 NOTE — ED PROVIDER NOTES
"  History   Chief Complaint:  Hematuria     HPI   Faby Yeung is a 55 year old female with history of multiple kidney stones requiring lithotripsy, medullary sponge kidney and nephrocalcinosis, chronic abdominal pain, pulmonary embolism, UTI and hypothyroidism who presents with hematuria and low back pain that has been ongoing for the past year. Her back pain is bilateral, but worse on the left side and she has been having ongoing chronic bright red bloody colored urine. She also mentions having a pressurized feeling when urinating, \"like her insides are going to come out.\" The patient states that she has a history of kidney issues and has had multiple kidney stones requiring removal and stenting. Her last lithotripsy was a few years ago and was done by Dr. De La Cruz at the the [facility]. Her last visit there was about a year ago and at that time she was having hematuria that was treated with a 6 month antibiotic course. She has not been on an antibiotic for the past 1.5 months and states that she has not taken any of her normal medications since November/December of 2020. The patient states that in addition to her hematuria and bilateral low back pain, she has been having increased fatigue that has been worsening since she stopped taking her thyroid medication in November of 2020. The patient also notes having shortness of breath, voice changes, facial swelling, left leg pain and leg swelling. The patient also has been having some abdominal pain, but notes it is not abnormal for her to have abdominal pain. She states a concern for a hernia and anemia as she used to get weekly infusions for her anemia. The patient otherwise denies any fevers, vomiting, diarrhea, rash, appetite changes, vaginal bleeding, or bloody stool.     Review of Systems   Constitutional: Positive for fatigue. Negative for appetite change and fever.   HENT: Positive for facial swelling and voice change.    Respiratory: " "Positive for shortness of breath.    Cardiovascular: Positive for leg swelling.   Gastrointestinal: Negative for blood in stool and vomiting.   Genitourinary: Positive for dysuria and hematuria. Negative for vaginal bleeding.   Musculoskeletal: Positive for back pain and myalgias.   Skin: Negative for rash.   All other systems reviewed and are negative.      Allergies:  Penicillins  Ibuprofen Sodium  Levaquin  Sulfa Drugs  Levofloxacin    Medications:  Gabapentin  Synthroid  Ativan  Percocet  Protonix  Sertraline  Bicitra  Zanaflex    Past Medical History:    Anxiety  Depressive disorder  GERD  Leukocytopenia  Nephrolithiasis  Impaired renal function  PONV  Raynaud's syndrome  Scoliosis  Gastritis and gastroduodenitis  Hypothyroidism  UTI  Chronic abdominal pain  Anemia  Cellulitis  Pulmonary embolism  Renal tubular acidosis  Hypothyroidism  Paroxysmal digital cyanosis   Pressure ulcer  Degenerative joint disease   Chronic pain syndrome    Past Surgical History:    Appendectomy  Ankle arthrodesis    Cystoscopy  Lithotripsy  Hernia repair  Hysterectomy with salpingo-oophorectomy  I and D   Wrist surgery  Patterson narciso placement    Social History:  The patient presents to the emergency department alone.  She denies any tobacco usage, but does drink alcohol occasionally.     Physical Exam     Patient Vitals for the past 24 hrs:   BP Temp Pulse Resp SpO2 Height Weight   21 -- -- -- -- 94 % -- --   21 (!) 129/99 -- 78 -- 94 % -- --   21 119/78 -- 71 -- 93 % -- --   21 120/87 -- -- -- -- -- --   21 1930 116/75 -- 62 -- 94 % -- --   21 1915 -- -- -- -- 93 % -- --   21 1905 -- -- -- -- 90 % -- --   21 1900 133/89 -- 70 -- 90 % -- --   21 1830 (!) 126/90 -- 70 -- 97 % -- --   21 1815 125/89 -- 72 -- 95 % -- --   21 1800 131/88 -- 73 -- 96 % -- --   21 1705 (!) 148/98 97  F (36.1  C) 86 18 99 % 1.676 m (5' 6\") 81.6 kg (180 " lb)   02/25/21 1703 -- 97  F (36.1  C) -- -- -- -- --       Physical Exam  General: Well appearing, nontoxic. Resting comfortably.  Playing solitaire on her phone.  Head:  Scalp, face, and head appear normal  Eyes:  Pupils are equal, round, reactive to light.  No nystagmus.  Mild periorbital edema.    Conjunctivae non-injected and sclerae white  ENT:    The external nose is normal.  Mucous membranes dry.  Posterior pharynx is clear without erythema swelling or exudates.    Pinnae are normal  Neck:  Normal range of motion.  Thyroid is normal.  No thyromegaly or thyroid nodules palpated.    There is no rigidity noted    Trachea is in the midline  CV:  Regular rate and rhythm     Normal S1/S2, no S3/S4    No murmur or rub. Radial pulses 2+ bilaterally.  Resp:  Lungs are clear and equal bilaterally  There is no tachypnea    No increased work of breathing    No rales, wheezing, or rhonchi  GI:  Abdomen is soft, no rigidity or guarding    Moderate distension. no mass.  Question possible ascites.    No tenderness or rebound tenderness.  Mild left flank tenderness to percussion.  MS:  Normal muscular tone    Symmetric motor strength    Bilateral chronic edema the lower extremities below the knee.  The lower extremities are mottled with delayed capillary refill and symmetric bilaterally.  The compartments of the bilateral lower legs are soft and nontender.  Toes and feet are relatively hyperemic compared to the remainder of the legs.  Skin:  No rash or acute skin lesions noted.  Skin is diffusely dry and scaling.  Neuro: Awake and alert  Speech is normal and fluent  Moves all extremities spontaneously  Psych:  Normal affect. Appropriate interactions.      Emergency Department Course     Imaging:  CT Abdomen Pelvis WO Contrast  1.  Bilateral nephrocalcinosis with several nonobstructing stones in the kidneys. No ureteric stone or hydronephrosis.  2.  Small calcified gallstone.  3.  Hysterectomy.  Reading per  radiology.    Laboratory:  CBC: HGB 9.7 (L) o/w WNL. (WBC 4.5, )   CMP: Creatinine 1.64 (H), GFR 35 (L), AST 76 (H) o/w AWNL     Lactic Acid (Collected at 1752): 0.6 (L)  INR: 0.93    TSH with free T4 reflex: 186.62 (H)  T4 free: 0.16 (L)   ABO/Rh type and Screen: B positive, Antibody positive     UA with micro: Blood large, Protein Albumin 70, Nitrite Positive, Leukocyte Esterase moderate, WBC >182 (H), RBC >182 (H), Bacteria Many, Squamous Epithelial 2 (H) o/w negative    Asymptomatic COVID-19 Virus by PCR: Pending   Urine Culture: Pending      Emergency Department Course:    Reviewed:  I reviewed nursing notes, vitals, past medical history and care everywhere    Assessments/Consults:   ED Course as of Feb 25 2114   Thu Feb 25, 2021 1732 I performed a physical examination as documented above.      2112 I spoke with Dr. Hernandez, hospitalist, who agreed to admit the patient.        Interventions:  1838 Tylenol 1000 mg PO  1839 Zofran 4 mg IV   1840 Morphine 4 mg IV   2105 Rocephin 2 mg IV    Disposition:  The patient was admitted to the hospital under the care of Dr. Hernandez.       Impression & Plan     Medical Decision Making:  Faby Yeung is a 55 year old female who presents to the emergency department for evaluation after she has not had any contact with the medical system since November of last year.  She has multiple chronic issues including medullary sponge kidney knee with nephrocalcinosis, recurrent kidney stone disease as well as recurrent UTIs and last year was on a 6-month course of antibiotics at one point.  She is not currently on any antibiotics.  In addition she has hypothyroidism which has been untreated since November as she has not been able to get her medications.  On my evaluation she appears unwell but not acutely toxic.  No evidence for sepsis.  She is hemodynamically stable.  No fever but she does have low temperature of 35.1  C likely due to her untreated hypothyroidism.   Work-up in the emergency department reveals JULIEN on CKD.  CMP is otherwise reassuring.  Lactic acid is normal.  TSH is severely elevated at 186.62.  Free T4 is 0.16.  CBC reveals anemia with hemoglobin of 9.7.  No leukocytosis.  Urinalysis is consistent with urinary tract infection with pyuria and positive nitrites.  Urine is sent for culture.  CT scan of the abdomen and pelvis reveals bilateral nephrocalcinosis with several nonobstructing kidney stones no ureteral stone or hydronephrosis.  No evidence of any other acute intra-abdominal pathology.  Patient has advanced untreated hypothyroidism at this time with low temperature, fatigue and early myxedema with periorbital and pretibial edema.  No altered mental status at this time.  Patient was treated with ceftriaxone for presumed pyelonephritis/complicated urinary tract infection given her history and worsening flank pain and systemic symptoms.  Given her multiple issues patient will require admission to the hospital for ongoing monitoring evaluation and treatment.  The case was discussed with the hospitalist and the patient was admitted in stable condition.      Covid-19  Faby Yeung was evaluated during a global COVID-19 pandemic, which necessitated consideration that the patient might be at risk for infection with the SARS-CoV-2 virus that causes COVID-19.   Applicable protocols for evaluation were followed during the patient's care.   COVID-19 was considered as part of the patient's evaluation. The plan for testing is:  a test was obtained during this visit.    Diagnosis:    ICD-10-CM    1. Pyelonephritis  N12    2. Myxedema  E03.9    3. Hypothyroidism, unspecified type  E03.9    4. Acute on chronic renal insufficiency  N28.9     N18.9          Scribe Disclosure:  I, Erich Luo, am serving as a scribe at 5:24 PM on 2/25/2021 to document services personally performed by Arsen Hawkins MD based on my observations and the provider's statements to me.             Arsen Hawkins MD  02/26/21 1300

## 2021-02-26 LAB
ALBUMIN SERPL-MCNC: 3.6 G/DL (ref 3.4–5)
ALP SERPL-CCNC: 59 U/L (ref 40–150)
ALT SERPL W P-5'-P-CCNC: 28 U/L (ref 0–50)
ANION GAP SERPL CALCULATED.3IONS-SCNC: 4 MMOL/L (ref 3–14)
AST SERPL W P-5'-P-CCNC: 66 U/L (ref 0–45)
BACTERIA SPEC CULT: ABNORMAL
BILIRUB DIRECT SERPL-MCNC: 0.1 MG/DL (ref 0–0.2)
BILIRUB SERPL-MCNC: 0.4 MG/DL (ref 0.2–1.3)
BUN SERPL-MCNC: 26 MG/DL (ref 7–30)
CALCIUM SERPL-MCNC: 8.3 MG/DL (ref 8.5–10.1)
CHLORIDE SERPL-SCNC: 105 MMOL/L (ref 94–109)
CO2 SERPL-SCNC: 28 MMOL/L (ref 20–32)
CORTIS SERPL-MCNC: 8.9 UG/DL (ref 4–22)
CREAT SERPL-MCNC: 1.6 MG/DL (ref 0.52–1.04)
ERYTHROCYTE [DISTWIDTH] IN BLOOD BY AUTOMATED COUNT: 13.9 % (ref 10–15)
FERRITIN SERPL-MCNC: 14 NG/ML (ref 8–252)
FOLATE SERPL-MCNC: 5.8 NG/ML
GFR SERPL CREATININE-BSD FRML MDRD: 36 ML/MIN/{1.73_M2}
GLUCOSE SERPL-MCNC: 83 MG/DL (ref 70–99)
HCT VFR BLD AUTO: 31.5 % (ref 35–47)
HGB BLD-MCNC: 9.1 G/DL (ref 11.7–15.7)
HGB BLD-MCNC: 9.4 G/DL (ref 11.7–15.7)
HGB BLD-MCNC: 9.6 G/DL (ref 11.7–15.7)
IRON SATN MFR SERPL: 17 % (ref 15–46)
IRON SERPL-MCNC: 62 UG/DL (ref 35–180)
LIPASE SERPL-CCNC: 138 U/L (ref 73–393)
Lab: ABNORMAL
MCH RBC QN AUTO: 29.1 PG (ref 26.5–33)
MCHC RBC AUTO-ENTMCNC: 29.8 G/DL (ref 31.5–36.5)
MCV RBC AUTO: 98 FL (ref 78–100)
PLATELET # BLD AUTO: 235 10E9/L (ref 150–450)
POTASSIUM SERPL-SCNC: 3.9 MMOL/L (ref 3.4–5.3)
PROT SERPL-MCNC: 7.3 G/DL (ref 6.8–8.8)
RBC # BLD AUTO: 3.23 10E12/L (ref 3.8–5.2)
SODIUM SERPL-SCNC: 137 MMOL/L (ref 133–144)
SPECIMEN SOURCE: ABNORMAL
TIBC SERPL-MCNC: 373 UG/DL (ref 240–430)
VIT B12 SERPL-MCNC: 305 PG/ML (ref 193–986)
WBC # BLD AUTO: 3.5 10E9/L (ref 4–11)

## 2021-02-26 PROCEDURE — 83540 ASSAY OF IRON: CPT | Performed by: HOSPITALIST

## 2021-02-26 PROCEDURE — 258N000003 HC RX IP 258 OP 636: Performed by: INTERNAL MEDICINE

## 2021-02-26 PROCEDURE — 250N000013 HC RX MED GY IP 250 OP 250 PS 637: Performed by: INTERNAL MEDICINE

## 2021-02-26 PROCEDURE — 83550 IRON BINDING TEST: CPT | Performed by: HOSPITALIST

## 2021-02-26 PROCEDURE — 80048 BASIC METABOLIC PNL TOTAL CA: CPT | Performed by: HOSPITALIST

## 2021-02-26 PROCEDURE — 82607 VITAMIN B-12: CPT | Performed by: HOSPITALIST

## 2021-02-26 PROCEDURE — 82728 ASSAY OF FERRITIN: CPT | Performed by: HOSPITALIST

## 2021-02-26 PROCEDURE — 258N000003 HC RX IP 258 OP 636: Performed by: HOSPITALIST

## 2021-02-26 PROCEDURE — 85018 HEMOGLOBIN: CPT | Performed by: HOSPITALIST

## 2021-02-26 PROCEDURE — 82746 ASSAY OF FOLIC ACID SERUM: CPT | Performed by: HOSPITALIST

## 2021-02-26 PROCEDURE — G0378 HOSPITAL OBSERVATION PER HR: HCPCS

## 2021-02-26 PROCEDURE — 250N000011 HC RX IP 250 OP 636: Performed by: INTERNAL MEDICINE

## 2021-02-26 PROCEDURE — 83690 ASSAY OF LIPASE: CPT | Performed by: HOSPITALIST

## 2021-02-26 PROCEDURE — 85027 COMPLETE CBC AUTOMATED: CPT | Performed by: HOSPITALIST

## 2021-02-26 PROCEDURE — 96376 TX/PRO/DX INJ SAME DRUG ADON: CPT

## 2021-02-26 PROCEDURE — 36415 COLL VENOUS BLD VENIPUNCTURE: CPT | Performed by: HOSPITALIST

## 2021-02-26 PROCEDURE — 250N000013 HC RX MED GY IP 250 OP 250 PS 637: Performed by: HOSPITALIST

## 2021-02-26 PROCEDURE — 99226 PR SUBSEQUENT OBSERVATION CARE,LEVEL III: CPT | Performed by: INTERNAL MEDICINE

## 2021-02-26 PROCEDURE — 99207 PR CDG-CODE CATEGORY CHANGED: CPT | Performed by: INTERNAL MEDICINE

## 2021-02-26 PROCEDURE — 80076 HEPATIC FUNCTION PANEL: CPT | Performed by: HOSPITALIST

## 2021-02-26 PROCEDURE — 99221 1ST HOSP IP/OBS SF/LOW 40: CPT | Performed by: INTERNAL MEDICINE

## 2021-02-26 RX ORDER — LEVOTHYROXINE SODIUM 150 UG/1
150 TABLET ORAL DAILY
Status: DISCONTINUED | OUTPATIENT
Start: 2021-02-26 | End: 2021-02-27

## 2021-02-26 RX ORDER — ONDANSETRON 2 MG/ML
4 INJECTION INTRAMUSCULAR; INTRAVENOUS EVERY 6 HOURS PRN
Status: DISCONTINUED | OUTPATIENT
Start: 2021-02-26 | End: 2021-02-28 | Stop reason: HOSPADM

## 2021-02-26 RX ORDER — PANTOPRAZOLE SODIUM 40 MG/1
40 TABLET, DELAYED RELEASE ORAL DAILY
Status: ON HOLD | COMMUNITY
End: 2023-03-25

## 2021-02-26 RX ORDER — PANTOPRAZOLE SODIUM 40 MG/1
40 TABLET, DELAYED RELEASE ORAL DAILY
Status: DISCONTINUED | OUTPATIENT
Start: 2021-02-26 | End: 2021-02-26

## 2021-02-26 RX ORDER — OXYCODONE AND ACETAMINOPHEN 5; 325 MG/1; MG/1
1 TABLET ORAL 3 TIMES DAILY PRN
COMMUNITY
End: 2023-03-24

## 2021-02-26 RX ORDER — GABAPENTIN 300 MG/1
600 CAPSULE ORAL 3 TIMES DAILY
COMMUNITY
End: 2023-03-24

## 2021-02-26 RX ORDER — PROCHLORPERAZINE 25 MG
25 SUPPOSITORY, RECTAL RECTAL EVERY 12 HOURS PRN
Status: DISCONTINUED | OUTPATIENT
Start: 2021-02-26 | End: 2021-02-28 | Stop reason: HOSPADM

## 2021-02-26 RX ORDER — HYDROXYZINE HYDROCHLORIDE 50 MG/1
50 TABLET, FILM COATED ORAL EVERY 4 HOURS PRN
Status: DISCONTINUED | OUTPATIENT
Start: 2021-02-26 | End: 2021-02-28 | Stop reason: HOSPADM

## 2021-02-26 RX ORDER — NALOXONE HYDROCHLORIDE 0.4 MG/ML
0.2 INJECTION, SOLUTION INTRAMUSCULAR; INTRAVENOUS; SUBCUTANEOUS
Status: DISCONTINUED | OUTPATIENT
Start: 2021-02-26 | End: 2021-02-28 | Stop reason: HOSPADM

## 2021-02-26 RX ORDER — NALOXONE HYDROCHLORIDE 0.4 MG/ML
0.4 INJECTION, SOLUTION INTRAMUSCULAR; INTRAVENOUS; SUBCUTANEOUS
Status: DISCONTINUED | OUTPATIENT
Start: 2021-02-26 | End: 2021-02-28 | Stop reason: HOSPADM

## 2021-02-26 RX ORDER — MORPHINE SULFATE 2 MG/ML
1 INJECTION, SOLUTION INTRAMUSCULAR; INTRAVENOUS
Status: DISCONTINUED | OUTPATIENT
Start: 2021-02-26 | End: 2021-02-27

## 2021-02-26 RX ORDER — GABAPENTIN 300 MG/1
300 CAPSULE ORAL 3 TIMES DAILY
Status: DISCONTINUED | OUTPATIENT
Start: 2021-02-26 | End: 2021-02-28 | Stop reason: HOSPADM

## 2021-02-26 RX ORDER — OXYCODONE HYDROCHLORIDE 5 MG/1
5-10 TABLET ORAL
Status: DISCONTINUED | OUTPATIENT
Start: 2021-02-26 | End: 2021-02-28 | Stop reason: HOSPADM

## 2021-02-26 RX ORDER — GABAPENTIN 300 MG/1
600 CAPSULE ORAL 3 TIMES DAILY
Status: DISCONTINUED | OUTPATIENT
Start: 2021-02-26 | End: 2021-02-26

## 2021-02-26 RX ORDER — CITRIC ACID/SODIUM CITRATE 334-500MG
30 SOLUTION, ORAL ORAL
Status: DISCONTINUED | OUTPATIENT
Start: 2021-02-26 | End: 2021-02-28 | Stop reason: HOSPADM

## 2021-02-26 RX ORDER — PANTOPRAZOLE SODIUM 40 MG/1
40 TABLET, DELAYED RELEASE ORAL
Status: DISCONTINUED | OUTPATIENT
Start: 2021-02-26 | End: 2021-02-28 | Stop reason: HOSPADM

## 2021-02-26 RX ORDER — DIPHENHYDRAMINE HYDROCHLORIDE 50 MG/ML
50 INJECTION INTRAMUSCULAR; INTRAVENOUS
Status: DISCONTINUED | OUTPATIENT
Start: 2021-02-26 | End: 2021-02-28 | Stop reason: HOSPADM

## 2021-02-26 RX ORDER — POLYETHYLENE GLYCOL 3350 17 G/17G
17 POWDER, FOR SOLUTION ORAL DAILY PRN
Status: DISCONTINUED | OUTPATIENT
Start: 2021-02-26 | End: 2021-02-28 | Stop reason: HOSPADM

## 2021-02-26 RX ORDER — SODIUM CITRATE AND CITRIC ACID 334; 500 MG/5ML; MG/5ML
30 LIQUID ORAL
Status: ON HOLD | COMMUNITY
End: 2023-03-25

## 2021-02-26 RX ORDER — LORAZEPAM 1 MG/1
1 TABLET ORAL EVERY 6 HOURS PRN
COMMUNITY
End: 2023-03-24

## 2021-02-26 RX ORDER — POLYETHYLENE GLYCOL 3350 17 G/17G
17 POWDER, FOR SOLUTION ORAL DAILY PRN
COMMUNITY
End: 2023-03-24

## 2021-02-26 RX ORDER — HYDROXYZINE HYDROCHLORIDE 25 MG/1
25 TABLET, FILM COATED ORAL EVERY 4 HOURS PRN
Status: DISCONTINUED | OUTPATIENT
Start: 2021-02-26 | End: 2021-02-28 | Stop reason: HOSPADM

## 2021-02-26 RX ORDER — LIDOCAINE 40 MG/G
CREAM TOPICAL
Status: DISCONTINUED | OUTPATIENT
Start: 2021-02-26 | End: 2021-02-28 | Stop reason: HOSPADM

## 2021-02-26 RX ORDER — METHYLPREDNISOLONE SODIUM SUCCINATE 125 MG/2ML
125 INJECTION, POWDER, LYOPHILIZED, FOR SOLUTION INTRAMUSCULAR; INTRAVENOUS
Status: DISCONTINUED | OUTPATIENT
Start: 2021-02-26 | End: 2021-02-28 | Stop reason: HOSPADM

## 2021-02-26 RX ORDER — LORAZEPAM 1 MG/1
1 TABLET ORAL EVERY 6 HOURS PRN
Status: DISCONTINUED | OUTPATIENT
Start: 2021-02-26 | End: 2021-02-28 | Stop reason: HOSPADM

## 2021-02-26 RX ORDER — ACETAMINOPHEN 325 MG/1
650 TABLET ORAL EVERY 4 HOURS PRN
Status: DISCONTINUED | OUTPATIENT
Start: 2021-02-26 | End: 2021-02-28 | Stop reason: HOSPADM

## 2021-02-26 RX ORDER — LIDOCAINE 40 MG/G
CREAM TOPICAL
Status: DISCONTINUED | OUTPATIENT
Start: 2021-02-26 | End: 2021-02-26

## 2021-02-26 RX ORDER — LEVOTHYROXINE SODIUM 150 UG/1
150 TABLET ORAL DAILY
Status: ON HOLD | COMMUNITY
End: 2021-02-28

## 2021-02-26 RX ORDER — ONDANSETRON 4 MG/1
4 TABLET, ORALLY DISINTEGRATING ORAL EVERY 6 HOURS PRN
Status: DISCONTINUED | OUTPATIENT
Start: 2021-02-26 | End: 2021-02-28 | Stop reason: HOSPADM

## 2021-02-26 RX ORDER — MORPHINE SULFATE 2 MG/ML
1 INJECTION, SOLUTION INTRAMUSCULAR; INTRAVENOUS
Status: DISCONTINUED | OUTPATIENT
Start: 2021-02-26 | End: 2021-02-28 | Stop reason: HOSPADM

## 2021-02-26 RX ORDER — PROCHLORPERAZINE MALEATE 5 MG
10 TABLET ORAL EVERY 6 HOURS PRN
Status: DISCONTINUED | OUTPATIENT
Start: 2021-02-26 | End: 2021-02-28 | Stop reason: HOSPADM

## 2021-02-26 RX ORDER — LEVOTHYROXINE SODIUM 20 UG/ML
50 INJECTION, SOLUTION INTRAVENOUS DAILY
Status: DISCONTINUED | OUTPATIENT
Start: 2021-02-26 | End: 2021-02-26

## 2021-02-26 RX ORDER — SODIUM CHLORIDE 9 MG/ML
INJECTION, SOLUTION INTRAVENOUS CONTINUOUS
Status: DISCONTINUED | OUTPATIENT
Start: 2021-02-26 | End: 2021-02-28

## 2021-02-26 RX ORDER — TIZANIDINE 2 MG/1
2 TABLET ORAL 3 TIMES DAILY
Status: DISCONTINUED | OUTPATIENT
Start: 2021-02-26 | End: 2021-02-28 | Stop reason: HOSPADM

## 2021-02-26 RX ADMIN — TIZANIDINE 2 MG: 2 TABLET ORAL at 15:54

## 2021-02-26 RX ADMIN — GABAPENTIN 300 MG: 300 CAPSULE ORAL at 15:54

## 2021-02-26 RX ADMIN — GABAPENTIN 600 MG: 300 CAPSULE ORAL at 10:35

## 2021-02-26 RX ADMIN — OXYCODONE HYDROCHLORIDE 10 MG: 5 TABLET ORAL at 10:34

## 2021-02-26 RX ADMIN — OXYCODONE HYDROCHLORIDE 10 MG: 5 TABLET ORAL at 05:24

## 2021-02-26 RX ADMIN — SODIUM CITRATE AND CITRIC ACID MONOHYDRATE 30 ML: 500; 334 SOLUTION ORAL at 10:48

## 2021-02-26 RX ADMIN — TIZANIDINE 2 MG: 2 TABLET ORAL at 03:47

## 2021-02-26 RX ADMIN — OXYCODONE HYDROCHLORIDE 5 MG: 5 TABLET ORAL at 01:09

## 2021-02-26 RX ADMIN — LEVOTHYROXINE SODIUM 150 MCG: 150 TABLET ORAL at 10:35

## 2021-02-26 RX ADMIN — IRON SUCROSE 200 MG: 20 INJECTION, SOLUTION INTRAVENOUS at 12:08

## 2021-02-26 RX ADMIN — PANTOPRAZOLE SODIUM 40 MG: 40 TABLET, DELAYED RELEASE ORAL at 10:35

## 2021-02-26 RX ADMIN — SODIUM CHLORIDE: 9 INJECTION, SOLUTION INTRAVENOUS at 14:33

## 2021-02-26 RX ADMIN — SALINE NASAL SPRAY 1 SPRAY: 1.5 SOLUTION NASAL at 20:17

## 2021-02-26 RX ADMIN — ACETAMINOPHEN 650 MG: 325 TABLET, FILM COATED ORAL at 01:09

## 2021-02-26 RX ADMIN — SALINE NASAL SPRAY 1 SPRAY: 1.5 SOLUTION NASAL at 10:48

## 2021-02-26 RX ADMIN — ACETAMINOPHEN 650 MG: 325 TABLET, FILM COATED ORAL at 08:52

## 2021-02-26 RX ADMIN — TIZANIDINE 2 MG: 2 TABLET ORAL at 21:57

## 2021-02-26 RX ADMIN — OXYCODONE HYDROCHLORIDE 5 MG: 5 TABLET ORAL at 16:10

## 2021-02-26 RX ADMIN — HYDROXYZINE HYDROCHLORIDE 25 MG: 25 TABLET, FILM COATED ORAL at 22:40

## 2021-02-26 RX ADMIN — TIZANIDINE 2 MG: 2 TABLET ORAL at 08:53

## 2021-02-26 RX ADMIN — SODIUM CITRATE AND CITRIC ACID MONOHYDRATE 30 ML: 500; 334 SOLUTION ORAL at 18:15

## 2021-02-26 RX ADMIN — SERTRALINE HYDROCHLORIDE 150 MG: 100 TABLET ORAL at 10:35

## 2021-02-26 RX ADMIN — GABAPENTIN 300 MG: 300 CAPSULE ORAL at 21:57

## 2021-02-26 RX ADMIN — SODIUM CHLORIDE: 9 INJECTION, SOLUTION INTRAVENOUS at 01:02

## 2021-02-26 RX ADMIN — ACETAMINOPHEN 650 MG: 325 TABLET, FILM COATED ORAL at 20:20

## 2021-02-26 ASSESSMENT — ACTIVITIES OF DAILY LIVING (ADL)
ADLS_ACUITY_SCORE: 13

## 2021-02-26 ASSESSMENT — MIFFLIN-ST. JEOR: SCORE: 1430.03

## 2021-02-26 NOTE — PROGRESS NOTES
[facility]    Hospitalist Progress Note  Name: Faby Yeung    MRN: [id number]  Provider: Tanya Braswell MD  Date of Service: 02/26/2021    Assessment & Plan   Summary of Stay: Faby Yeung is a 55 year old female who was admitted on 2/25/2021 for severe hypothyroidism, normocytic anemia, chronic pain, UTI and chronic kidney disease.      Her past medical history significant for raynaud's, GERD, hx of VTE, nephrolithiasis, prior hx of ankle surgeries, She presented with fatigue weakness,  Swelling,  Hoarse voice, poor appetite & poor energy.  Evaluation in the emergency room was concerning for symptoms of hypothyroidism, UTI and chronic kidney disease.    severe hypothyroidism  -Noncompliant with her levothyroxine.    -Initially received Will give her  mcg of levothyroxine  -Resume prior to admission 150 mcg of levothyroxine daily    Normocytic Anemia  -Possibly related to hematuria versus anemia chronic disease related to her hypothyroidism will order iron studies in the morning  - type and screen, monitor hemoglobin   -IV iron infusion ordered by nephrology  -B12 levels ordered     Hx of chronic pain syndrome related to medullary sponge kidney, nephrolithiasis now with gross hematuria, no with abdominal pain for the past month  -She does have pyuria on UA  -  CT scan with Bilateral nephrocalcinosis with several nonobstructing stones in the kidneys. No ureteric stone or hydronephrosis  -She was treated with IV ceftriaxone in the ED, will follow cultures and clinical status will hold off further treatment pending urine culture results.  -Noted penicillin allergy in the chart but she has received ceftriaxone in the ED and also received ceftriaxone in 2009  -Patient has been lost to follow-up with urology, urology consult has been placed  -Continue ceftriaxone for now     Chronic kidney disease stage II versus JULIEN  -Monitor renal function closely IV fluids avoid further  nephrotoxins  -Progressively worse since 2013  -She will need close follow-up as outpatient in nephrology clinic  -Received IV fluids and treatment for UTI     Distant history of provoked VTE 4/2013     History of PUD  - resume pantoprazole     Abdominal bloating and upper abdominal discomfort  -We will check LFTs and lipase      DVT Prophylaxis: Pneumatic Compression Devices  Code Status: Full Code    Disposition: Expected discharge in 2 to 3 days if symptoms improve.      Interval History   Assumed care reviewed chart.  Patient complains of generalized malaise to feels better than yesterday.  Denies any chest pain or shortness of breath feels abdominal fullness and bloating.  Complains of upper abdominal discomfort.  10 point review of system was conducted was otherwise negative      -Data reviewed today: I reviewed all new labs and imaging reports over the last 24 hours. I personally reviewed no images or EKG's today.    Physical Exam   Temp: 95.1  F (35.1  C) Temp src: Oral BP: 116/76 Pulse: 68   Resp: 20 SpO2: 96 % O2 Device: Nasal cannula Oxygen Delivery: 2 LPM  Vitals:    02/25/21 1705 02/26/21 0029   Weight: 81.6 kg (180 lb) 82.3 kg (181 lb 8 oz)     Vital Signs with Ranges  Temp:  [95.1  F (35.1  C)-97.9  F (36.6  C)] 95.1  F (35.1  C)  Pulse:  [60-86] 68  Resp:  [16-20] 20  BP: (106-148)/(64-99) 116/76  SpO2:  [87 %-99 %] 96 %  No intake/output data recorded.      GEN:  Alert, oriented x 3, appears comfortable, NAD.  HEENT:  Normocephalic/atraumatic, no scleral icterus, no nasal discharge, mouth moist.  CV:  Regular rate and rhythm, no murmur or JVD.  S1 + S2 noted, no S3 or S4.  LUNGS:  Clear to auscultation bilaterally without rales/rhonchi/wheezing/retractions.  Symmetric chest rise on inhalation noted.  ABD:  Active bowel sounds, soft, abd distened, + hernia, minimal upper abd tenderness.  No rebound/guarding/rigidity.  EXT:  trace edema.  No cyanosis.    SKIN:  Dry to touch,    Medications     sodium  chloride 75 mL/hr at 02/26/21 0102       gabapentin  300 mg Oral TID     iron sucrose (VENOFER) intermittent infusion  200 mg Intravenous Daily     levothyroxine  150 mcg Oral Daily     pantoprazole  40 mg Oral QAM AC     sertraline  150 mg Oral Daily     sodium chloride  1 spray Both Nostrils BID     sodium citrate-citric acid  30 mL Oral TID w/meals     tiZANidine  2 mg Oral TID     Data     No results for input(s): PH, PHV, PO2, PO2V, SAT, PCO2, PCO2V, HCO3, HCO3V in the last 168 hours.  Recent Labs   Lab 02/26/21  0702 02/25/21  1752   WBC 3.5* 4.5   HGB 9.4* 9.7*   HCT 31.5* 32.9*   MCV 98 97    253     Recent Labs   Lab 02/26/21  0702 02/25/21  1752    137   POTASSIUM 3.9 3.9   CHLORIDE 105 103   CO2 28 28   ANIONGAP 4 6   GLC 83 75   BUN 26 28   CR 1.60* 1.64*   GFRESTIMATED 36* 35*   GFRESTBLACK 41* 40*   KATELYNN 8.3* 9.3     Recent Labs   Lab 02/25/21  1923   CULT >100,000 colonies/mL  Escherichia coli  Susceptibility testing in progress  *     No results for input(s): NTBNPI, NTBNP in the last 168 hours.  No results for input(s): CKT in the last 168 hours.    Invalid input(s): CK, CK TOTAL  Recent Labs   Lab 02/26/21  0702 02/25/21  1752   GLC 83 75     Recent Labs   Lab 02/26/21  0702 02/25/21  1752   HGB 9.4* 9.7*     Recent Labs   Lab 02/26/21  0702 02/25/21  1752   AST 66* 76*   ALT 28 33   ALKPHOS 59 77   BILITOTAL 0.4 0.4     Recent Labs   Lab 02/25/21  1752   INR 0.93     Recent Labs   Lab 02/25/21  1752   LACT 0.6*     Recent Labs   Lab 02/26/21  0702   LIPASE 138     Recent Labs   Lab 02/26/21  0702 02/25/21  1752   BUN 26 28   CR 1.60* 1.64*     Recent Labs   Lab 02/25/21  1752   .62*     No results for input(s): TROPONIN, TROPI, TROPR in the last 168 hours.    Invalid input(s): TROP, TROPONINIES  Recent Labs   Lab 02/25/21 1923   COLOR Orange   APPEARANCE Cloudy   URINEGLC Negative   URINEBILI Negative   URINEKETONE Negative   SG 1.014   UBLD Large*   URINEPH 6.5   PROTEIN 70*    NITRITE Positive*   LEUKEST Moderate*   RBCU >182*   WBCU >182*       Recent Results (from the past 24 hour(s))   CT Abdomen Pelvis w/o Contrast    Narrative    EXAM: CT ABDOMEN PELVIS W/O CONTRAST  LOCATION: [facility]  DATE/TIME: 2/25/2021 7:54 PM    INDICATION: Flank pain, recurrent stone disease suspected - left  COMPARISON: 10/14/2018  TECHNIQUE: CT scan of the abdomen and pelvis was performed without IV contrast. Multiplanar reformats were obtained. Dose reduction techniques were used.  CONTRAST: None.    FINDINGS:   LOWER CHEST: Chronic subpleural interstitial change in the visualized lower lungs.    HEPATOBILIARY: Calcified gallstone.    PANCREAS: Normal.    SPLEEN: Normal.    ADRENAL GLANDS: Normal.    KIDNEYS/BLADDER: Significant bilateral medullary nephrocalcinosis is again seen with a few small simple cysts. No follow-up is needed. Again seen is a 6 mm nonobstructing stone within the mid to lower right kidney with several smaller discrete stones in   the right kidney. Several nonobstructing stones in the left kidney largest measuring 4 mm.    BOWEL: Normal.    LYMPH NODES: Normal.    VASCULATURE: Unremarkable.    PELVIC ORGANS: Hysterectomy.    MUSCULOSKELETAL: Scoliosis.      Impression    IMPRESSION:   1.  Bilateral nephrocalcinosis with several nonobstructing stones in the kidneys. No ureteric stone or hydronephrosis.    2.  Small calcified gallstone.    3.  Hysterectomy.

## 2021-02-26 NOTE — PLAN OF CARE
Pt A&Ox4, but lethargic most of shift, up with assist of 1. Pt  N/V, SOB, lightheadedness, and dizziness. Pt c/o abdominal pain 5/10, PRN oxycodone given with relief. IV infusing 75 mL/ hr NS. Pt is on IV abx Rocephin.  VSS on 2 LPM NC due to decreased O2 stats while sleeping. Tele SR/SB. Nephrology following. Plan to discharge back home in 1-2 days. Continue with POC.

## 2021-02-26 NOTE — PHARMACY-ADMISSION MEDICATION HISTORY
Admission medication history interview status for this patient is complete. See [facility] admission navigator for allergy information, prior to admission medications and immunization status.     Medication history interview done, indicate source(s): Patient  Medication history resources (including written lists, pill bottles, clinic record):care everywhere    Changes made to PTA medication list:  Added: all medications  Deleted: none  Changed: none    Actions taken by pharmacist (provider contacted, etc):left sticky note thierry TRIANA     Additional medication history information: pt reports she hasn't taken any medications for 3-4 months    Medication reconciliation/reorder completed by provider prior to medication history?  Y   (Y/N)     Prior to Admission medications    Medication Sig Last Dose Taking? Auth Provider   gabapentin (NEURONTIN) 300 MG capsule Take 600 mg by mouth 3 times daily   Unknown, Entered By History   levothyroxine (SYNTHROID/LEVOTHROID) 150 MCG tablet Take 150 mcg by mouth daily   Unknown, Entered By History   LORazepam (ATIVAN) 1 MG tablet Take 1 mg by mouth every 6 hours as needed for anxiety DO NOT TAKE WITHIN 6 HOURS OF OXYCODONE   Unknown, Entered By History   oxyCODONE-acetaminophen (PERCOCET) 5-325 MG tablet Take 1 tablet by mouth 3 times daily as needed for severe pain   Unknown, Entered By History   pantoprazole (PROTONIX) 40 MG EC tablet Take 40 mg by mouth daily   Unknown, Entered By History   polyethylene glycol (MIRALAX) 17 g packet Take 17 g by mouth daily as needed for constipation   Unknown, Entered By History   sertraline (ZOLOFT) 100 MG tablet Take 150 mg by mouth daily   Unknown, Entered By History   sodium chloride (OCEAN) 0.65 % nasal spray Spray 1 spray into both nostrils 2 times daily   Unknown, Entered By History   sodium citrate/citric acid (BICITRA) 500-334 MG/5ML SOLN solution Take 30 mLs by mouth 3 times daily (with meals) After meals   Unknown, Entered By History

## 2021-02-26 NOTE — H&P
[facility]    History and Physical  Hospitalist       Date of Admission:  2/25/2021    Assessment and Plan:      Faby Yeung is a 55 year old female  including raynaud's, GERD, hx of VTE, nephrolithiasis, prior hx of ankle surgeries, who presents with fatigue weakness swelling in her body hoarseness of her voice poor appetite poor energy.  She also been having abdominal pain for the past month radiating up to her back.     ED vitals temperature of 97 pulse 64 /80 on room air.  Labs notable for BUN 28 creatinine 1.64, lactic acid 0.6 .62 Free T4 0.16, hemoglobin 9.7 hematocrit 32.9.  CT abdomen pelvis shows  CT scan with Bilateral nephrocalcinosis with several nonobstructing stones in the kidneys. No ureteric stone or hydronephrosis.  Been admitted for the management of her hypothyroid and hematuria possible UTI       Possible early myxedema coma, versus severe hypothyroidism  -Noncompliant with her levothyroxine.  Will give her  mcg of levothyroxine one time in the ED and the 50 mg levothyroxine daily    Normocytic Anemia  -Possibly related to hematuria versus anemia chronic disease related to her hypothyroidism will order iron studies in the morning  - type and screen, monitor hemoglobin every 8 hours    Hx of chronic pain syndrome related to medullary sponge kidney, nephrolithiasis now with gross hematuria, no with abdominal pain for the past month  -She does have pyuria on UA  -  CT scan with Bilateral nephrocalcinosis with several nonobstructing stones in the kidneys. No ureteric stone or hydronephrosis  -She was treated with IV ceftriaxone in the ED, will follow cultures and clinical status will hold off further treatment pending urine culture results.  -Noted penicillin allergy in the chart but she has received ceftriaxone in the ED and also received ceftriaxone in 2009  -Patient has been lost to follow-up with urology, urology consult has been  placed    Chronic kidney disease stage II versus JULIEN  -Monitor renal function closely IV fluids avoid further nephrotoxins    Distant history of provoked VTE 4/2013    History of PUD  - resume pantoprazole       ---------     # Code status: Full  # Anticipated discharge date and Disposition: 1-2 days  # DVT: SCDs  # IVF: Normal saline at 75 ml/hour                        Emma Hernandez MD  Text Page (7am - 6pm, M-F)          Primary Care Physician   [facility]    Chief Complaint   weakness  History is obtained from the patient    History of Present Illness   Faby Yeung is a 55 year old female  including raynaud's, GERD, hx of VTE, nephrolithiasis, prior hx of ankle surgeries, who presents with fatigue weakness swelling in her body hoarseness of her voice poor appetite poor energy.  She also been having abdominal pain for the past month radiating up to her back.  Patient notes her her urine is darker color today.  Patient states she is been very busy with life and thus she is been noncompliant with taking her levothyroxine she also states that her PCP has retired and she she did not call back to see a new physician for her levothyroxine prescription.  She is in profoundly weak inability to get up sleeping all day poor appetite poor energy loss of interest in daily activities.  She has noticed that her face is more swollen her voice is more hoarse her body seems more swollen, having dyspnea on exertion with activity.  Her abdominal pain is 7 out of 10 comes and goes sometimes constant sometimes radiates to her back left-sided sharp shooting.    Past Medical History    I have reviewed this patient's medical history and updated it with pertinent information if needed.   Past Medical History:   Diagnosis Date     Anxiety      Depressive disorder      Gastro-oesophageal reflux disease      Hx of blood clots     5-6 months ago 2013 (winter) on coumadin     Leukocytopenia, unspecified       Nephrolithiasis      Other specified disorders resulting from impaired renal function      Pain in joint, ankle and foot      PONV (postoperative nausea and vomiting)      Raynaud's syndrome      Scoliosis      Unspecified gastritis and gastroduodenitis without mention of hemorrhage      Unspecified hypothyroidism      Urinary tract infection        Past Surgical History   I have reviewed this patient's surgical history and updated it with pertinent information if needed.  Past Surgical History:   Procedure Laterality Date     APPENDECTOMY       ARTHRODESIS ANKLE  3/18/2013    Procedure: ARTHRODESIS ANKLE;  LEFT TALONAVICULAR FUSION (C-ARM);  Surgeon: Cheryl Arroyo MD;  Location: [facility]      SECTION       CYSTOSCOPY       GENITOURINARY SURGERY      lithotripsy     HERNIA REPAIR       HYSTERECTOMY TOTAL ABDOMINAL, BILATERAL SALPINGO-OOPHORECTOMY, COMBINED       IRRIGATION AND DEBRIDEMENT FOOT, COMBINED  2013    Procedure: COMBINED IRRIGATION AND DEBRIDEMENT FOOT;  LEFT FOOT IRRIGATION AND DEBRIDEMENT, HARDWARE REMOVAL;  Surgeon: Cheryl Arroyo MD;  Location: [facility]     ORTHOPEDIC SURGERY       wrist,  ankle, sherie narciso placement     SPINE SURGERY         Prior to Admission Medications   Prior to Admission Medications   Prescriptions Last Dose Informant Patient Reported? Taking?   GABAPENTIN PO   Yes No   LORazepam (ATIVAN) 1 MG tablet  Self Yes No   Sig: Take 1 mg by mouth 3 times daily as needed for anxiety   Pantoprazole Sodium (PROTONIX PO)   Yes No   SERTRALINE HCL PO   Yes No   Sig: Take 150 mg by mouth daily   levothyroxine (SYNTHROID) 150 MCG tablet   Yes No   Sig: Take 150 mcg by mouth daily   oxyCODONE-acetaminophen (PERCOCET)  MG per tablet   No No   Sig: Take 1-2 tablets by mouth every 6 hours as needed for moderate to severe pain   sodium citrate/citric acid (BICITRA) 500-334 MG/5ML SOLN   Yes No   Sig: Take 30 mLs by mouth 3 times daily   tiZANidine (ZANAFLEX) 2 MG tablet    No No   Sig: Take 1 tablet (2 mg) by mouth 3 times daily      Facility-Administered Medications: None     Allergies   Allergies   Allergen Reactions     Penicillins Anaphylaxis     Ibuprofen Sodium      Levaquin      Sulfa Drugs        Social History   I have reviewed this patient's social history and updated it with pertinent information if needed. Faby Yeung  reports that she has quit smoking. She smoked 0.50 packs per day. She has never used smokeless tobacco. She reports current alcohol use. She reports that she does not use drugs.    Family History   Family history reviewed with patient and is noncontributory.    ROS   a 12 point review of system discussed addition negative as per HPI    Physical Exam   Temp: 97  F (36.1  C)   BP: 119/80 Pulse: 64   Resp: 18 SpO2: 93 % O2 Device: None (Room air)    Vital Signs with Ranges  Temp:  [97  F (36.1  C)] 97  F (36.1  C)  Pulse:  [62-86] 64  Resp:  [18] 18  BP: (111-148)/(64-99) 119/80  SpO2:  [90 %-99 %] 93 %  180 lbs 0 oz    General: Pt in NAD, normal appearance  HEENT: PERRLA, EOMI, normocephalic / atraumatic no cervical LAD, no bruit, no pallor, WNL oropharynx, neck supple  Cardiac: +S1, S2, RRR, no MRG, +1 edema BLE  Lungs: Clear to Auscultation Bilateral, normal breathing without accessory muscle usage, no wheezing, rhonchi or crackles  GI: soft NT/ND +bowel sounds all quadrants  Psych: normal mood and affect, A&Ox3  Neurological: A&O x3, non focal  Skin: warm, dry, normal turgor, no rash    Data   Data reviewed today:  I personally reviewed no images or EKG's today.  Recent Labs   Lab 02/25/21  1752   WBC 4.5   HGB 9.7*   MCV 97      INR 0.93      POTASSIUM 3.9   CHLORIDE 103   CO2 28   BUN 28   CR 1.64*   ANIONGAP 6   KATELYNN 9.3   GLC 75   ALBUMIN 4.0   PROTTOTAL 8.3   BILITOTAL 0.4   ALKPHOS 77   ALT 33   AST 76*       Recent Results (from the past 24 hour(s))   CT Abdomen Pelvis w/o Contrast    Narrative    EXAM: CT ABDOMEN PELVIS W/O  CONTRAST  LOCATION: [facility]  DATE/TIME: 2/25/2021 7:54 PM    INDICATION: Flank pain, recurrent stone disease suspected - left  COMPARISON: 10/14/2018  TECHNIQUE: CT scan of the abdomen and pelvis was performed without IV contrast. Multiplanar reformats were obtained. Dose reduction techniques were used.  CONTRAST: None.    FINDINGS:   LOWER CHEST: Chronic subpleural interstitial change in the visualized lower lungs.    HEPATOBILIARY: Calcified gallstone.    PANCREAS: Normal.    SPLEEN: Normal.    ADRENAL GLANDS: Normal.    KIDNEYS/BLADDER: Significant bilateral medullary nephrocalcinosis is again seen with a few small simple cysts. No follow-up is needed. Again seen is a 6 mm nonobstructing stone within the mid to lower right kidney with several smaller discrete stones in   the right kidney. Several nonobstructing stones in the left kidney largest measuring 4 mm.    BOWEL: Normal.    LYMPH NODES: Normal.    VASCULATURE: Unremarkable.    PELVIC ORGANS: Hysterectomy.    MUSCULOSKELETAL: Scoliosis.      Impression    IMPRESSION:   1.  Bilateral nephrocalcinosis with several nonobstructing stones in the kidneys. No ureteric stone or hydronephrosis.    2.  Small calcified gallstone.    3.  Hysterectomy.

## 2021-02-26 NOTE — PLAN OF CARE
Presentation/Diagnosis: Pt admitted 2/25 with complaints of fatigue, edema, poor appetite, and abdominal pain radiating up the back for one month. Found to have possible UTI, incompliant with treatment for hypothyroidism, and to have multiple non-obstructive kidney stones   History: Raynauds, GERD, VTE, ankle surgeries   Labs/Protocols: HgB: 9.7, cr: 1.64, TSH: 186  Vitals: VSS. See flow sheets. 15mg oxycodone given overnight.   Telemetry: T SR.   Respiratory: RA. Wdl. Intermittent cough thought  Neuro: WDL. Complained of headache overnight.   GI/: Continent of bowel and bladder. Complaining of epigastric pain   Skin: Dry, scaly, edematous. Bruising.   LDA's: R PIV infusing NS @ 75.   Diet: Reg   Activity: SBA.  Plan: Plan is to discharge in 1-2 days pending pain control and management of UTI.

## 2021-02-26 NOTE — CONSULTS
Consult Date:  2021           CONSULTANT:  Manuel Alvares MD      REASON FOR CONSULTATION:  Nephrolithiasis.      HISTORY OF PRESENT ILLNESS:  This is a 55-year-old with a long history of nephrolithiasis and medullary calcinosis.  She was admitted with fatigue, weakness, hoarseness and poor appetite and found to be markedly hypothyroid due to noncompliance with Synthroid replacement.  She had a CT scan done because she had a history of kidney stones and also abnormal urinalysis.  The urinalysis showed 70 of protein, large blood, greater than 182 white cells, greater than 182 red cells.  A culture is pending.  Her CT scan showed bilateral medullary nephrocalcinosis and bilateral stones.  She was put on Rocephin.  The patient has been followed in a kidney stone clinic (stone clinic [facility], Dr. Feng Smith #[phone]).  She has been prescribed Bicitra.  Stone analysis in the past has shown calcium phosphate stones.  She has not passed any stones recently, although she does have gross hematuria and pain in her left flank and left groin.  She has had surgical interventions in the past for stones, but none recently.  She has not been compliant with the Bicitra in recent history.  She is not a diabetic and does not have hypertension.  She does not take nonsteroidal anti-inflammatory drugs.      PAST MEDICAL HISTORY:  Includes DVT, hypothyroidism and anemia.      PAST SURGICAL HISTORY:  Includes ankle surgery, appendectomy, , cystoscopy, hernia, DESTINY/BSO and spine surgery.      CURRENT MEDICATIONS:  Include:   1.  Rocephin.   2.  Synthroid.   3.  Bicitra.   4.  Normal saline.      SOCIAL HISTORY:  She is a former smoker.  She has an occasional drink.      FAMILY HISTORY:  Noncontributory.      REVIEW OF SYSTEMS:  Negative except as noted above.      PHYSICAL EXAMINATION:   GENERAL:  She is alert.  She has slow speech with a hoarse voice.  She is obese.  Her weight is 82.3  kilograms.   VITAL SIGNS:  She is afebrile, pulse 71, blood pressure 106/60, respiratory rate 16.   SKIN:  Negative.   HEENT:  Head shows no trauma.  The eyes show pupils round and react to light.  The mouth shows good dentition.  Posterior pharynx is clear.   NECK:  Supple.   LUNGS:  Clear breath sounds.   CARDIAC:  Regular rhythm, normal S1, S2, no murmur.   ABDOMEN:  Shows some tenderness in both upper quadrants.  There is no guarding or rebound.  There are normal bowel sounds.   EXTREMITIES:  She has minimal edema in the lower extremities.  Normal nails, joints and pulses.   NEUROLOGIC:  Her speech is slow and her voice is thick, but she is appropriate and oriented and she can move all 4 extremities.      LABORATORY DATA:  On 06/13, creatinine 0.93, estimated GFR of 64.  On 02/26/2021, creatinine 1.80, estimated GFR 36.  Sodium 137, potassium 2.9, chloride 105, bicarbonate 28, calcium 8.3.  She shows evidence of iron deficiency.  She has a low white count of 3.5 and a hemoglobin of 9.4.  Reviewing her creatinine over time, there has been a steady worsening in creatinine since 2013 with a corresponding drop in GFR.        1.  She does not have typical causes of chronic kidney disease such as hypertension or diabetes.  She does have nephrocalcinosis and kidney stones; however, which can contribute.  She is not on any overtly nephrotoxic medications and does not take nonsteroidal anti-inflammatory drugs.  We will change her diet to a high water, low-salt diet.  She can get IV fluids for now if it is felt that she is dehydrated, although I do not see evidence of that on exam.  In the morning we will check a comprehensive metabolic panel, magnesium, phosphorus, PTH and uric acid.  Most importantly, she will need followup at her stone clinic in 1-2 weeks after discharge from the hospital to get back on a therapeutic intervention for her calcium phosphate stones.   2.  Chronic kidney disease, stage III.  This is  progressively worse since .  It may be related to her stone disease and nephrocalcinosis.  We should avoid nephrotoxins.  Her blood pressure is not high and she does not have diabetes.  She should follow-up with the Nephrology Clinic, either at [facility] where she sees the stones doctors or in our group and within a month after discharge.   3.  Urinary tract infection.  She is on Rocephin.   4.  Anemia.  We will give her IV Venofer and check B12 and folate levels.   5.  Hypothyroidism.  She has been restarted on Synthroid.         DALE NINO MD             D: 2021   T: 2021   MT: NELSON      Name:     LIZZ HOLGUIN   MRN:      [id number]        Account:       [id number]   :      1965           Consult Date:  2021      Document: [id number]

## 2021-02-26 NOTE — PLAN OF CARE
"/87 (BP Location: Left arm)   Pulse 69   Temp 97.8  F (36.6  C) (Oral)   Resp 20   Ht 1.676 m (5' 6\")   Wt 82.3 kg (181 lb 8 oz)   SpO2 98%   BMI 29.29 kg/m      A&O, very lethargic this shift. Tele is SR. C/o headache and upper abdominal pain, PRN Oxycodone 10mg given with relief. On 2L O2 via NC, stats were 87% on RA. Skin is dry and davie. NS infusing at 75ml/hr. Home dose of Levothyroxine restarted, pt noncompliant at home. IV Iron given. Will continue POC.   "

## 2021-02-26 NOTE — ED NOTES
"[facility]  ED Nurse Handoff Report    Faby Yeung is a 55 year old female   ED Chief complaint: Hematuria  . ED Diagnosis:   Final diagnoses:   Pyelonephritis   Myxedema   Hypothyroidism, unspecified type   Acute on chronic renal insufficiency     Allergies:   Allergies   Allergen Reactions     Penicillins Anaphylaxis     Ibuprofen Sodium      Levaquin      Sulfa Drugs        Code Status: Full Code  Activity level - Baseline/Home:  Independent. Activity Level - Current:   Assist X 1. Lift room needed: No. Bariatric: No   Needed: No   Isolation: No. Infection: Not Applicable.     Vital Signs:   Vitals:    02/25/21 2000 02/25/21 2015 02/25/21 2030 02/25/21 2045   BP: 120/87 119/78 (!) 129/99    Pulse:  71 78    Resp:       Temp:       SpO2:  93% 94% 94%   Weight:       Height:           Cardiac Rhythm:  ,      Pain level:    Patient confused: No. Patient Falls Risk: Yes.   Elimination Status: Has voided   Patient Report - Initial Complaint: Blood in urine for 1 year and low back back \"forever.\"  Has a kidney doctor but has not followed up. Hx of medullary kidney disease. States she has no energy.  Unable to get daily prescriptions filled due to her MD retiring. ALso c/o shortness of breath and sore throat and thinks this is due to not taking daily meds for over 1-2 months.   Focused Assessment: Genitourinary - Genitourinary WDL: .WDL except (Blood in urine today with back pain. ABC intact alert and no distress. )  Tests Performed: labs, UA, CT. Abnormal Results:   Labs Ordered and Resulted from Time of ED Arrival Up to the Time of Departure from the ED   CBC WITH PLATELETS DIFFERENTIAL - Abnormal; Notable for the following components:       Result Value    RBC Count 3.39 (*)     Hemoglobin 9.7 (*)     Hematocrit 32.9 (*)     MCHC 29.5 (*)     All other components within normal limits   ROUTINE UA WITH MICROSCOPIC - Abnormal; Notable for the following components:    Blood Urine Large " (*)     Protein Albumin Urine 70 (*)     Nitrite Urine Positive (*)     Leukocyte Esterase Urine Moderate (*)     WBC Urine >182 (*)     RBC Urine >182 (*)     Bacteria Urine Many (*)     Squamous Epithelial /HPF Urine 2 (*)     All other components within normal limits   LACTIC ACID WHOLE BLOOD - Abnormal; Notable for the following components:    Lactic Acid 0.6 (*)     All other components within normal limits   TSH WITH FREE T4 REFLEX - Abnormal; Notable for the following components:    .62 (*)     All other components within normal limits   COMPREHENSIVE METABOLIC PANEL - Abnormal; Notable for the following components:    Creatinine 1.64 (*)     GFR Estimate 35 (*)     GFR Estimate If Black 40 (*)     AST 76 (*)     All other components within normal limits   T4 FREE - Abnormal; Notable for the following components:    T4 Free 0.16 (*)     All other components within normal limits   ABO/RH TYPE AND SCREEN - Abnormal; Notable for the following components:    Antibody Screen Pos (*)     All other components within normal limits   INR   SARS-COV-2 (COVID-19) VIRUS RT-PCR   PERIPHERAL IV CATHETER   URINE CULTURE AEROBIC BACTERIAL     CT Abdomen Pelvis w/o Contrast   Final Result   IMPRESSION:    1.  Bilateral nephrocalcinosis with several nonobstructing stones in the kidneys. No ureteric stone or hydronephrosis.      2.  Small calcified gallstone.      3.  Hysterectomy.         .   Treatments provided: Rocephin, morphine, zofran, Tylenol  Family Comments: family made aware by patient  OBS brochure/video discussed/provided to patient:  No  ED Medications:   Medications   ondansetron (ZOFRAN) injection 4 mg (4 mg Intravenous Given 2/25/21 1839)   morphine (PF) injection 4 mg (4 mg Intravenous Given 2/25/21 1840)   cefTRIAXone (ROCEPHIN) 2 g vial to attach to  ml bag for ADULTS or NS 50 ml bag for PEDS (2 g Intravenous New Bag 2/25/21 2105)   acetaminophen (TYLENOL) tablet 1,000 mg (1,000 mg Oral Given  2/25/21 2508)     Drips infusing:  No  For the majority of the shift, the patient's behavior Green. Interventions performed were none.    Sepsis treatment initiated: No     Patient tested for COVID 19 prior to admission: YES    ED Nurse Name/Phone Number: Sera Fonseca RN,   9:22 PM    RECEIVING UNIT ED HANDOFF REVIEW    Above ED Nurse Handoff Report was reviewed: Yes  Reviewed by: Amy C. Severson, RN on February 26, 2021 at 12:19 AM

## 2021-02-26 NOTE — UTILIZATION REVIEW
"  Admission Status; Secondary Review Determination         Under the authority of the Utilization Management Committee, the utilization review process indicated a secondary review on the above patient.  The review outcome is based on review of the medical records, discussions with staff, and applying clinical experience noted on the date of the review.          (x) Observation Status Appropriate - This patient does not meet hospital inpatient criteria and is placed in observation status. If this patient's primary payer is Medicare and was admitted as an inpatient, Condition Code 44 should be used and patient status changed to \"observation\".     RATIONALE FOR DETERMINATION   55 year old female who was admitted on 2/25/2021 for severe hypothyroidism, normocytic anemia, chronic pain, UTI and chronic kidney disease. Her past medical history significant for raynaud's, GERD, hx of VTE, nephrolithiasis, prior hx of ankle surgeries, She presented with fatigue weakness,  Swelling,  Hoarse voice, poor appetite & poor energy.  Evaluation in the emergency room was concerning for symptoms of hypothyroidism, UTI and chronic kidney disease.  mcg of levothyroxine.  She was changed to oral levothyroxine, urine culture is pending however patient has no leukocytosis or fever or evidence of systemic inflammatory response.  Discussed with  severity of illness, intensity of service provided, expected LOS and risk for adverse outcome make the care appropriate for further observation; however, doesn't meet criteria for hospital inpatient admission.     This document was produced using voice recognition software.      The information on this document is developed by the utilization review team in order for the business office to ensure compliance.  This only denotes the appropriateness of proper admission status and does not reflect the quality of care rendered.         The definitions of Inpatient Status and Observation " Status used in making the determination above are those provided in the CMS Coverage Manual, Chapter 1 and Chapter 6, section 70.4.      Sincerely,     FAYE ARELLANO MD    System Medical Director  Utilization Management  [facility].

## 2021-02-26 NOTE — PROVIDER NOTIFICATION
Pt complains of itching. Can we get atarax? Also has persistent pain despite Tylenol and oxy. Is it possible to increase frequency of the oxy?

## 2021-02-27 LAB
ANION GAP SERPL CALCULATED.3IONS-SCNC: <1 MMOL/L (ref 3–14)
AST SERPL W P-5'-P-CCNC: 57 U/L (ref 0–45)
BUN SERPL-MCNC: 22 MG/DL (ref 7–30)
CALCIUM SERPL-MCNC: 8 MG/DL (ref 8.5–10.1)
CHLORIDE SERPL-SCNC: 106 MMOL/L (ref 94–109)
CO2 SERPL-SCNC: 31 MMOL/L (ref 20–32)
CREAT SERPL-MCNC: 1.52 MG/DL (ref 0.52–1.04)
CREAT UR-MCNC: 47 MG/DL
ERYTHROCYTE [DISTWIDTH] IN BLOOD BY AUTOMATED COUNT: 14 % (ref 10–15)
FOLATE SERPL-MCNC: 6.3 NG/ML
GFR SERPL CREATININE-BSD FRML MDRD: 38 ML/MIN/{1.73_M2}
GLUCOSE SERPL-MCNC: 76 MG/DL (ref 70–99)
HCT VFR BLD AUTO: 32.8 % (ref 35–47)
HGB BLD-MCNC: 9.5 G/DL (ref 11.7–15.7)
MAGNESIUM SERPL-MCNC: 2.2 MG/DL (ref 1.6–2.3)
MCH RBC QN AUTO: 29.1 PG (ref 26.5–33)
MCHC RBC AUTO-ENTMCNC: 29 G/DL (ref 31.5–36.5)
MCV RBC AUTO: 101 FL (ref 78–100)
PHOSPHATE SERPL-MCNC: 3.2 MG/DL (ref 2.5–4.5)
PLATELET # BLD AUTO: 216 10E9/L (ref 150–450)
POTASSIUM SERPL-SCNC: 4.9 MMOL/L (ref 3.4–5.3)
PROT UR-MCNC: 0.47 G/L
PROT/CREAT 24H UR: 0.99 G/G CR (ref 0–0.2)
PTH-INTACT SERPL-MCNC: 56 PG/ML (ref 18–80)
PTH-INTACT SERPL-MCNC: 76 PG/ML (ref 18–80)
RBC # BLD AUTO: 3.26 10E12/L (ref 3.8–5.2)
SODIUM SERPL-SCNC: 137 MMOL/L (ref 133–144)
T4 FREE SERPL-MCNC: 0.29 NG/DL (ref 0.76–1.46)
URATE SERPL-MCNC: 5.5 MG/DL (ref 2.6–6)
VIT B12 SERPL-MCNC: 340 PG/ML (ref 193–986)
WBC # BLD AUTO: 3.7 10E9/L (ref 4–11)

## 2021-02-27 PROCEDURE — 82607 VITAMIN B-12: CPT | Performed by: INTERNAL MEDICINE

## 2021-02-27 PROCEDURE — 83735 ASSAY OF MAGNESIUM: CPT | Performed by: INTERNAL MEDICINE

## 2021-02-27 PROCEDURE — 80048 BASIC METABOLIC PNL TOTAL CA: CPT | Performed by: INTERNAL MEDICINE

## 2021-02-27 PROCEDURE — 99226 PR SUBSEQUENT OBSERVATION CARE,LEVEL III: CPT | Performed by: INTERNAL MEDICINE

## 2021-02-27 PROCEDURE — 82306 VITAMIN D 25 HYDROXY: CPT | Performed by: INTERNAL MEDICINE

## 2021-02-27 PROCEDURE — 84550 ASSAY OF BLOOD/URIC ACID: CPT | Performed by: INTERNAL MEDICINE

## 2021-02-27 PROCEDURE — 258N000003 HC RX IP 258 OP 636: Performed by: HOSPITALIST

## 2021-02-27 PROCEDURE — 99207 PR CDG-CODE CATEGORY CHANGED: CPT | Performed by: INTERNAL MEDICINE

## 2021-02-27 PROCEDURE — 250N000013 HC RX MED GY IP 250 OP 250 PS 637: Performed by: INTERNAL MEDICINE

## 2021-02-27 PROCEDURE — 83970 ASSAY OF PARATHORMONE: CPT | Performed by: INTERNAL MEDICINE

## 2021-02-27 PROCEDURE — 99233 SBSQ HOSP IP/OBS HIGH 50: CPT | Performed by: INTERNAL MEDICINE

## 2021-02-27 PROCEDURE — 84450 TRANSFERASE (AST) (SGOT): CPT | Performed by: INTERNAL MEDICINE

## 2021-02-27 PROCEDURE — 82746 ASSAY OF FOLIC ACID SERUM: CPT | Performed by: INTERNAL MEDICINE

## 2021-02-27 PROCEDURE — 250N000011 HC RX IP 250 OP 636: Performed by: INTERNAL MEDICINE

## 2021-02-27 PROCEDURE — 84439 ASSAY OF FREE THYROXINE: CPT | Performed by: INTERNAL MEDICINE

## 2021-02-27 PROCEDURE — 84156 ASSAY OF PROTEIN URINE: CPT | Performed by: INTERNAL MEDICINE

## 2021-02-27 PROCEDURE — G0378 HOSPITAL OBSERVATION PER HR: HCPCS

## 2021-02-27 PROCEDURE — 250N000013 HC RX MED GY IP 250 OP 250 PS 637: Performed by: HOSPITALIST

## 2021-02-27 PROCEDURE — 85027 COMPLETE CBC AUTOMATED: CPT | Performed by: INTERNAL MEDICINE

## 2021-02-27 PROCEDURE — 36415 COLL VENOUS BLD VENIPUNCTURE: CPT | Performed by: INTERNAL MEDICINE

## 2021-02-27 PROCEDURE — 96376 TX/PRO/DX INJ SAME DRUG ADON: CPT

## 2021-02-27 PROCEDURE — 258N000003 HC RX IP 258 OP 636: Performed by: INTERNAL MEDICINE

## 2021-02-27 PROCEDURE — 84100 ASSAY OF PHOSPHORUS: CPT | Performed by: INTERNAL MEDICINE

## 2021-02-27 RX ORDER — LEVOTHYROXINE SODIUM 25 UG/1
25 TABLET ORAL ONCE
Status: COMPLETED | OUTPATIENT
Start: 2021-02-27 | End: 2021-02-27

## 2021-02-27 RX ORDER — LEVOTHYROXINE SODIUM 175 UG/1
175 TABLET ORAL DAILY
Status: DISCONTINUED | OUTPATIENT
Start: 2021-02-28 | End: 2021-02-28 | Stop reason: HOSPADM

## 2021-02-27 RX ADMIN — IRON SUCROSE 200 MG: 20 INJECTION, SOLUTION INTRAVENOUS at 09:25

## 2021-02-27 RX ADMIN — ACETAMINOPHEN 650 MG: 325 TABLET, FILM COATED ORAL at 16:14

## 2021-02-27 RX ADMIN — GABAPENTIN 300 MG: 300 CAPSULE ORAL at 09:19

## 2021-02-27 RX ADMIN — SODIUM CITRATE AND CITRIC ACID MONOHYDRATE 30 ML: 500; 334 SOLUTION ORAL at 11:48

## 2021-02-27 RX ADMIN — LEVOTHYROXINE SODIUM 25 MCG: 0.03 TABLET ORAL at 11:48

## 2021-02-27 RX ADMIN — SODIUM CITRATE AND CITRIC ACID MONOHYDRATE 30 ML: 500; 334 SOLUTION ORAL at 18:16

## 2021-02-27 RX ADMIN — TIZANIDINE 2 MG: 2 TABLET ORAL at 09:19

## 2021-02-27 RX ADMIN — SODIUM CITRATE AND CITRIC ACID MONOHYDRATE 30 ML: 500; 334 SOLUTION ORAL at 09:20

## 2021-02-27 RX ADMIN — GABAPENTIN 300 MG: 300 CAPSULE ORAL at 21:22

## 2021-02-27 RX ADMIN — LEVOTHYROXINE SODIUM 150 MCG: 150 TABLET ORAL at 09:20

## 2021-02-27 RX ADMIN — OXYCODONE HYDROCHLORIDE 5 MG: 5 TABLET ORAL at 18:16

## 2021-02-27 RX ADMIN — SODIUM CHLORIDE: 9 INJECTION, SOLUTION INTRAVENOUS at 19:02

## 2021-02-27 RX ADMIN — SERTRALINE HYDROCHLORIDE 150 MG: 100 TABLET ORAL at 09:20

## 2021-02-27 RX ADMIN — PANTOPRAZOLE SODIUM 40 MG: 40 TABLET, DELAYED RELEASE ORAL at 06:33

## 2021-02-27 RX ADMIN — ACETAMINOPHEN 650 MG: 325 TABLET, FILM COATED ORAL at 06:32

## 2021-02-27 RX ADMIN — SODIUM CHLORIDE: 9 INJECTION, SOLUTION INTRAVENOUS at 03:09

## 2021-02-27 RX ADMIN — GABAPENTIN 300 MG: 300 CAPSULE ORAL at 16:07

## 2021-02-27 RX ADMIN — TIZANIDINE 2 MG: 2 TABLET ORAL at 16:07

## 2021-02-27 RX ADMIN — TIZANIDINE 2 MG: 2 TABLET ORAL at 21:22

## 2021-02-27 RX ADMIN — SALINE NASAL SPRAY 1 SPRAY: 1.5 SOLUTION NASAL at 09:21

## 2021-02-27 RX ADMIN — SALINE NASAL SPRAY 1 SPRAY: 1.5 SOLUTION NASAL at 21:22

## 2021-02-27 ASSESSMENT — MIFFLIN-ST. JEOR: SCORE: 1459.51

## 2021-02-27 NOTE — PROGRESS NOTES
" Renal Medicine Progress Note            Assessment/Plan:     55 y.o woman admitted with for severe hypothyroidism.     # Medullary sponge kidney kidney stone and nephrocalcinosis.   # CKD IIIB: Baseline Scr ~ 1.4-1.5 mg/dl. Likely due to nephrocalcinosis.   # Severe hypothyroidism due to not taking her synthroid  # Severe Fe def anemia: Getting IV FE  # Raynaud syndrome    Plan:  # Drink 3-4 liters of water daily for kidney stones and nephrocalcinosis  # Check vitamin D3, iPTH and 24 hrs urine collection for stone  # May benefit from thiazide diuretic down the line  # Check UPCR  # Needs to follow-up with urology for kidney stones        Interval History:     Afebrile. BP is on the low side. She has some abdominal and back pain. Denies worsening SOB.           Medications and Allergies:       gabapentin  300 mg Oral TID     iron sucrose (VENOFER) intermittent infusion  200 mg Intravenous Daily     [START ON 2/28/2021] levothyroxine  175 mcg Oral Daily     levothyroxine  25 mcg Oral Once     pantoprazole  40 mg Oral QAM AC     sertraline  150 mg Oral Daily     sodium chloride  1 spray Both Nostrils BID     sodium citrate-citric acid  30 mL Oral TID w/meals     tiZANidine  2 mg Oral TID        Allergies   Allergen Reactions     Penicillins Anaphylaxis     Ibuprofen Sodium      Levaquin      Sulfa Drugs             Physical Exam:   Vitals were reviewed   , Blood pressure 108/60, pulse 73, temperature 98.3  F (36.8  C), temperature source Oral, resp. rate 20, height 1.676 m (5' 6\"), weight 85.3 kg (188 lb), SpO2 95 %.    Wt Readings from Last 3 Encounters:   02/27/21 85.3 kg (188 lb)   10/14/18 79.4 kg (175 lb)   04/11/17 74.8 kg (165 lb)       Intake/Output Summary (Last 24 hours) at 2/27/2021 1059  Last data filed at 2/27/2021 0900  Gross per 24 hour   Intake 1200 ml   Output --   Net 1200 ml       GENERAL APPEARANCE: NAD  HEENT:  Eyes/ears/nose/neck grossly normal  RESP: lungs cta b c good efforts, no crackles, " rhonchi or wheezes  CV: RRR, nl S1/S2,   ABDOMEN: obese, soft, mild tenderness, no rebound or guarding  EXTREMITIES/SKIN: no rashes/lesions on observed skin; no edema  NEURO: Slow. Awake and answering questions         Data:     CBC RESULTS:     Recent Labs   Lab 02/27/21  0659 02/26/21  2143 02/26/21  1409 02/26/21  0702 02/25/21  1752   WBC 3.7*  --   --  3.5* 4.5   RBC 3.26*  --   --  3.23* 3.39*   HGB 9.5* 9.1* 9.6* 9.4* 9.7*   HCT 32.8*  --   --  31.5* 32.9*     --   --  235 253       Basic Metabolic Panel:  Recent Labs   Lab 02/27/21  0659 02/26/21  0702 02/25/21  1752    137 137   POTASSIUM 4.9 3.9 3.9   CHLORIDE 106 105 103   CO2 31 28 28   BUN 22 26 28   CR 1.52* 1.60* 1.64*   GLC 76 83 75   KATELYNN 8.0* 8.3* 9.3       INR  Recent Labs   Lab 02/25/21  1752   INR 0.93      Attestation:   I have reviewed today's relevant vital signs, notes, medications, labs and imaging.    Shyam Ogden MD  [facility] - Nephrology  Office phone :[phone]  Pager: [phone]

## 2021-02-27 NOTE — PROGRESS NOTES
[facility]    Hospitalist Progress Note  Name: Faby Yeung    MRN: [id number]  Provider: Tanya Braswell MD  Date of Service: 02/27/2021    Assessment & Plan   Summary of Stay: Faby Yeung is a 55 year old female who was admitted on 2/25/2021 for severe hypothyroidism, normocytic anemia, chronic pain, UTI and chronic kidney disease.      Her past medical history significant for raynaud's, GERD, hx of VTE, nephrolithiasis, prior hx of ankle surgeries, She presented with fatigue weakness,  Swelling,  Hoarse voice, poor appetite & poor energy.  Evaluation in the emergency room was concerning for symptoms of hypothyroidism, UTI and chronic kidney disease.    Patient complains of increasing lethargy weakness has been sleepier most morning per RN report.  She is on pain meds but nothing since last evening.  Continues to have some hoarse voice and lack of energy.  Free T4 levels are low.  Increased dose of levothyroxine.  Continue to monitor.    severe hypothyroidism  -Noncompliant with her levothyroxine.    -Patient is lethargic but awake oriented and eating.  -No signs of hypothermia no hyponatremia no hypercapnia  -Initially received   mcg of levothyroxine  -Resumed prior to admission 150 mcg of levothyroxine on 2/26/2021  -Patient continues to be very lethargic and tired.  Free T4 levels are still lower.  Will increase levothyroxine 275 mcg and give additional 25 mcg today    Normocytic Anemia  -Possibly related to hematuria versus anemia chronic disease related to her hypothyroidism will order iron studies in the morning  - type and screen, monitor hemoglobin   -IV iron infusion ordered by nephrology  -B12 levels ordered     Hx of chronic pain syndrome related to medullary sponge kidney, nephrolithiasis now with gross hematuria, no with abdominal pain for the past month  -She does have pyuria on UA  -  CT scan with Bilateral nephrocalcinosis with several nonobstructing stones in  the kidneys. No ureteric stone or hydronephrosis  -She was treated with IV ceftriaxone in the ED, will follow cultures and clinical status will hold off further treatment pending urine culture results.  -Noted penicillin allergy in the chart but she has received ceftriaxone in the ED and also received ceftriaxone in 2009  -Patient has been lost to follow-up with urology, urology consult has been placed  -Continue ceftriaxone for now     Chronic kidney disease stage II versus JULIEN  -Monitor renal function closely IV fluids avoid further nephrotoxins  -Progressively worse since 2013  -She will need close follow-up as outpatient in nephrology clinic  -Received IV fluids and treatment for UTI     Distant history of provoked VTE 4/2013     History of PUD  - resume pantoprazole     Abdominal bloating and upper abdominal discomfort  -We will check LFTs and lipase      DVT Prophylaxis: Pneumatic Compression Devices  Code Status: Full Code    Disposition: Expected discharge in 1-2 days if symptoms improve.      Interval History   Reviewed chart.  Continues to be lethargic and weak.  Denies any chest pain or shortness of breath complains of generalized malaise and weakness.  Was using some oxygen overnight.  However sats on room air were 95%.  Feels very tired and sleepy today voice remains hoarse.  10 point review of system was conducted was otherwise negative      -Data reviewed today: I reviewed all new labs and imaging reports over the last 24 hours. I personally reviewed no images or EKG's today.    Physical Exam   Temp: 98.3  F (36.8  C) Temp src: Oral BP: 108/60 Pulse: 73   Resp: 20 SpO2: 95 % O2 Device: Nasal cannula Oxygen Delivery: 1 LPM  Vitals:    02/25/21 1705 02/26/21 0029 02/27/21 0703   Weight: 81.6 kg (180 lb) 82.3 kg (181 lb 8 oz) 85.3 kg (188 lb)     Vital Signs with Ranges  Temp:  [95.1  F (35.1  C)-98.5  F (36.9  C)] 98.3  F (36.8  C)  Pulse:  [60-73] 73  Resp:  [14-20] 20  BP: ()/(57-87)  108/60  SpO2:  [87 %-98 %] 95 %  I/O last 3 completed shifts:  In: 960 [P.O.:960]  Out: -       GEN:  Alert, oriented x 3, appears comfortable, NAD.  HEENT:  Normocephalic/atraumatic, no scleral icterus, no nasal discharge, mouth moist.  CV:  Regular rate and rhythm, no murmur or JVD.  S1 + S2 noted, no S3 or S4.  LUNGS:  Clear to auscultation bilaterally without rales/rhonchi/wheezing/retractions.  Symmetric chest rise on inhalation noted.  ABD:  Active bowel sounds, soft, abd distened, + hernia, minimal upper abd tenderness.  No rebound/guarding/rigidity.  EXT:  trace edema.  No cyanosis.    SKIN:  Dry to touch,    Medications     sodium chloride 75 mL/hr at 02/27/21 0933       gabapentin  300 mg Oral TID     iron sucrose (VENOFER) intermittent infusion  200 mg Intravenous Daily     levothyroxine  150 mcg Oral Daily     pantoprazole  40 mg Oral QAM AC     sertraline  150 mg Oral Daily     sodium chloride  1 spray Both Nostrils BID     sodium citrate-citric acid  30 mL Oral TID w/meals     tiZANidine  2 mg Oral TID     Data     No results for input(s): PH, PHV, PO2, PO2V, SAT, PCO2, PCO2V, HCO3, HCO3V in the last 168 hours.  Recent Labs   Lab 02/27/21  0659 02/26/21  2143 02/26/21  1409 02/26/21  0702 02/25/21  1752   WBC 3.7*  --   --  3.5* 4.5   HGB 9.5* 9.1* 9.6* 9.4* 9.7*   HCT 32.8*  --   --  31.5* 32.9*   *  --   --  98 97     --   --  235 253     Recent Labs   Lab 02/27/21  0659 02/26/21  0702 02/25/21  1752    137 137   POTASSIUM 4.9 3.9 3.9   CHLORIDE 106 105 103   CO2 31 28 28   ANIONGAP <1* 4 6   GLC 76 83 75   BUN 22 26 28   CR 1.52* 1.60* 1.64*   GFRESTIMATED 38* 36* 35*   GFRESTBLACK 44* 41* 40*   KATELYNN 8.0* 8.3* 9.3     Recent Labs   Lab 02/25/21 1923   CULT >100,000 colonies/mL  Escherichia coli  *     No results for input(s): NTBNPI, NTBNP in the last 168 hours.  No results for input(s): CKT in the last 168 hours.    Invalid input(s): CK, CK TOTAL  Recent Labs   Lab  02/27/21  0659 02/26/21  0702 02/25/21  1752   GLC 76 83 75     Recent Labs   Lab 02/27/21  0659 02/26/21  2143 02/26/21  1409   HGB 9.5* 9.1* 9.6*     Recent Labs   Lab 02/27/21  0659 02/26/21  0702 02/25/21  1752   AST 57* 66* 76*   ALT  --  28 33   ALKPHOS  --  59 77   BILITOTAL  --  0.4 0.4     Recent Labs   Lab 02/25/21  1752   INR 0.93     Recent Labs   Lab 02/25/21  1752   LACT 0.6*     Recent Labs   Lab 02/26/21  0702   LIPASE 138     Recent Labs   Lab 02/27/21  0659 02/26/21  0702 02/25/21  1752   BUN 22 26 28   CR 1.52* 1.60* 1.64*     Recent Labs   Lab 02/25/21  1752   .62*     No results for input(s): TROPONIN, TROPI, TROPR in the last 168 hours.    Invalid input(s): TROP, TROPONINIES  Recent Labs   Lab 02/25/21  1923   COLOR Orange   APPEARANCE Cloudy   URINEGLC Negative   URINEBILI Negative   URINEKETONE Negative   SG 1.014   UBLD Large*   URINEPH 6.5   PROTEIN 70*   NITRITE Positive*   LEUKEST Moderate*   RBCU >182*   WBCU >182*       No results found for this or any previous visit (from the past 24 hour(s)).

## 2021-02-27 NOTE — PLAN OF CARE
"/72 (BP Location: Left arm)   Pulse 57   Temp 98.3  F (36.8  C) (Oral)   Resp 20   Ht 1.676 m (5' 6\")   Wt 85.3 kg (188 lb)   SpO2 93%   BMI 30.34 kg/m          PRIMARY DIAGNOSIS: \"GENERIC\" NURSING  OUTPATIENT/OBSERVATION GOALS TO BE MET BEFORE DISCHARGE:  1. ADLs back to baseline: No    2. Activity and level of assistance: Up with standby assistance.    3. Pain status: Improved-controlled with oral pain medications.    4. Return to near baseline physical activity: No     Discharge Planner Nurse   Safe discharge environment identified: No  Barriers to discharge: Yes - still very weak        Entered by: Susy Castrejon 02/27/2021 2:44 PM     Please review provider order for any additional goals.   Nurse to notify provider when observation goals have been met and patient is ready for discharge.          A&O. A1 GB. Tele is SR. Pt still lethargic this shift. 1L O2 via NC, sats tend to drop when sleeping. IV iron given. Tolerating diet well. Neph following - plan to strain urine for kidney stone.T4 0.29.  . Education given about hypothyroidism and the importance of pt taking her synthroid. Handouts given. Will continue POC.   "

## 2021-02-27 NOTE — PLAN OF CARE
"PRIMARY DIAGNOSIS: \"GENERIC\" NURSING  OUTPATIENT/OBSERVATION GOALS TO BE MET BEFORE DISCHARGE:  ADLs back to baseline: No    Activity and level of assistance: Up with standby assistance.    Pain status: Improved-controlled with oral pain medications.    Return to near baseline physical activity: No     Discharge Planner Nurse   Safe discharge environment identified: No  Barriers to discharge: Yes       Entered by: Anai Dixon 02/27/2021 7:03 AM     Please review provider order for any additional goals.   Nurse to notify provider when observation goals have been met and patient is ready for discharge.  "

## 2021-02-27 NOTE — PLAN OF CARE
"PRIMARY DIAGNOSIS: \"GENERIC\" NURSING  OUTPATIENT/OBSERVATION GOALS TO BE MET BEFORE DISCHARGE:  1. ADLs back to baseline: No    2. Activity and level of assistance: Up with standby assistance.    3. Pain status: Improved-controlled with oral pain medications.    4. Return to near baseline physical activity: No     Discharge Planner Nurse   Safe discharge environment identified: No  Barriers to discharge: Yes       Entered by: Anai Dixon 02/27/2021 7:22 AM     Please review provider order for any additional goals.   Nurse to notify provider when observation goals have been met and patient is ready for discharge.      End of Shift Note:  For VS and complete assessments, please see documentation flowsheets.     Pertinent shift assessments: Pt AxOx 4; lethargic but arousable to voice, VSS. 1 L NC. TELE SB prolong QT. Reports pain in abdomen, prn tylenol and atarax given with decrease in pain noted. Pt denies N/V, SOB, lightheadedness, and dizziness. Edema BLE 2+. Voiding without difficulty, red urine. Pt states \"I feel some pain when I pee, but it's improved\" IV infusing NS 75 mL/hr. Up with Ax1 gb. Alarms on for safety. Hgb  9.1. Nephro following. Will continue with POC.                "

## 2021-02-27 NOTE — PLAN OF CARE
"PRIMARY DIAGNOSIS: \"GENERIC\" NURSING  OUTPATIENT/OBSERVATION GOALS TO BE MET BEFORE DISCHARGE:  ADLs back to baseline: Yes    Activity and level of assistance: Up with standby assistance.    Pain status: Improved-controlled with oral pain medications.    Return to near baseline physical activity: No     Discharge Planner Nurse   Safe discharge environment identified: No  Barriers to discharge: Yes       Entered by: Anai Dixon 02/27/2021 7:04 AM     Please review provider order for any additional goals.   Nurse to notify provider when observation goals have been met and patient is ready for discharge.  "

## 2021-02-28 ENCOUNTER — APPOINTMENT (OUTPATIENT)
Dept: PHYSICAL THERAPY | Facility: CLINIC | Age: 56
End: 2021-02-28
Attending: INTERNAL MEDICINE
Payer: COMMERCIAL

## 2021-02-28 VITALS
WEIGHT: 188.7 LBS | OXYGEN SATURATION: 96 % | SYSTOLIC BLOOD PRESSURE: 137 MMHG | HEIGHT: 66 IN | BODY MASS INDEX: 30.33 KG/M2 | TEMPERATURE: 97.2 F | DIASTOLIC BLOOD PRESSURE: 89 MMHG | HEART RATE: 70 BPM | RESPIRATION RATE: 18 BRPM

## 2021-02-28 LAB
ANION GAP SERPL CALCULATED.3IONS-SCNC: 3 MMOL/L (ref 3–14)
BUN SERPL-MCNC: 17 MG/DL (ref 7–30)
CALCIUM SERPL-MCNC: 8 MG/DL (ref 8.5–10.1)
CHLORIDE SERPL-SCNC: 104 MMOL/L (ref 94–109)
CO2 SERPL-SCNC: 31 MMOL/L (ref 20–32)
CREAT SERPL-MCNC: 1.26 MG/DL (ref 0.52–1.04)
ERYTHROCYTE [DISTWIDTH] IN BLOOD BY AUTOMATED COUNT: 13.9 % (ref 10–15)
GFR SERPL CREATININE-BSD FRML MDRD: 47 ML/MIN/{1.73_M2}
GLUCOSE SERPL-MCNC: 74 MG/DL (ref 70–99)
HCT VFR BLD AUTO: 31.7 % (ref 35–47)
HGB BLD-MCNC: 9.4 G/DL (ref 11.7–15.7)
MCH RBC QN AUTO: 29.5 PG (ref 26.5–33)
MCHC RBC AUTO-ENTMCNC: 29.7 G/DL (ref 31.5–36.5)
MCV RBC AUTO: 99 FL (ref 78–100)
PLATELET # BLD AUTO: 205 10E9/L (ref 150–450)
POTASSIUM SERPL-SCNC: 4.2 MMOL/L (ref 3.4–5.3)
RBC # BLD AUTO: 3.19 10E12/L (ref 3.8–5.2)
SODIUM SERPL-SCNC: 138 MMOL/L (ref 133–144)
T4 FREE SERPL-MCNC: 0.31 NG/DL (ref 0.76–1.46)
WBC # BLD AUTO: 3.7 10E9/L (ref 4–11)

## 2021-02-28 PROCEDURE — 85027 COMPLETE CBC AUTOMATED: CPT | Performed by: INTERNAL MEDICINE

## 2021-02-28 PROCEDURE — 258N000003 HC RX IP 258 OP 636: Performed by: INTERNAL MEDICINE

## 2021-02-28 PROCEDURE — 250N000013 HC RX MED GY IP 250 OP 250 PS 637: Performed by: HOSPITALIST

## 2021-02-28 PROCEDURE — 99207 PR CDG-CODE CATEGORY CHANGED: CPT | Performed by: INTERNAL MEDICINE

## 2021-02-28 PROCEDURE — 97116 GAIT TRAINING THERAPY: CPT | Mod: GP | Performed by: PHYSICAL THERAPIST

## 2021-02-28 PROCEDURE — 36416 COLLJ CAPILLARY BLOOD SPEC: CPT | Performed by: INTERNAL MEDICINE

## 2021-02-28 PROCEDURE — 96376 TX/PRO/DX INJ SAME DRUG ADON: CPT

## 2021-02-28 PROCEDURE — G0378 HOSPITAL OBSERVATION PER HR: HCPCS

## 2021-02-28 PROCEDURE — 97530 THERAPEUTIC ACTIVITIES: CPT | Mod: GP | Performed by: PHYSICAL THERAPIST

## 2021-02-28 PROCEDURE — 97161 PT EVAL LOW COMPLEX 20 MIN: CPT | Mod: GP | Performed by: PHYSICAL THERAPIST

## 2021-02-28 PROCEDURE — 99214 OFFICE O/P EST MOD 30 MIN: CPT | Performed by: INTERNAL MEDICINE

## 2021-02-28 PROCEDURE — 99217 PR OBSERVATION CARE DISCHARGE: CPT | Performed by: INTERNAL MEDICINE

## 2021-02-28 PROCEDURE — 84439 ASSAY OF FREE THYROXINE: CPT | Performed by: INTERNAL MEDICINE

## 2021-02-28 PROCEDURE — 80048 BASIC METABOLIC PNL TOTAL CA: CPT | Performed by: INTERNAL MEDICINE

## 2021-02-28 PROCEDURE — 250N000011 HC RX IP 250 OP 636: Performed by: INTERNAL MEDICINE

## 2021-02-28 PROCEDURE — 250N000013 HC RX MED GY IP 250 OP 250 PS 637: Performed by: INTERNAL MEDICINE

## 2021-02-28 PROCEDURE — 99232 SBSQ HOSP IP/OBS MODERATE 35: CPT | Performed by: INTERNAL MEDICINE

## 2021-02-28 RX ORDER — LEVOTHYROXINE SODIUM 175 UG/1
175 TABLET ORAL DAILY
Qty: 30 TABLET | Refills: 0 | Status: ON HOLD | OUTPATIENT
Start: 2021-03-01 | End: 2023-03-25

## 2021-02-28 RX ADMIN — SERTRALINE HYDROCHLORIDE 150 MG: 100 TABLET ORAL at 08:25

## 2021-02-28 RX ADMIN — TIZANIDINE 2 MG: 2 TABLET ORAL at 08:25

## 2021-02-28 RX ADMIN — SODIUM CITRATE AND CITRIC ACID MONOHYDRATE 30 ML: 500; 334 SOLUTION ORAL at 08:26

## 2021-02-28 RX ADMIN — LEVOTHYROXINE SODIUM 175 MCG: 0.17 TABLET ORAL at 08:25

## 2021-02-28 RX ADMIN — TIZANIDINE 2 MG: 2 TABLET ORAL at 17:18

## 2021-02-28 RX ADMIN — SODIUM CITRATE AND CITRIC ACID MONOHYDRATE 30 ML: 500; 334 SOLUTION ORAL at 12:35

## 2021-02-28 RX ADMIN — SALINE NASAL SPRAY 1 SPRAY: 1.5 SOLUTION NASAL at 08:28

## 2021-02-28 RX ADMIN — GABAPENTIN 300 MG: 300 CAPSULE ORAL at 08:25

## 2021-02-28 RX ADMIN — GABAPENTIN 300 MG: 300 CAPSULE ORAL at 17:18

## 2021-02-28 RX ADMIN — PANTOPRAZOLE SODIUM 40 MG: 40 TABLET, DELAYED RELEASE ORAL at 06:29

## 2021-02-28 RX ADMIN — IRON SUCROSE 200 MG: 20 INJECTION, SOLUTION INTRAVENOUS at 10:00

## 2021-02-28 RX ADMIN — SODIUM CITRATE AND CITRIC ACID MONOHYDRATE 30 ML: 500; 334 SOLUTION ORAL at 17:18

## 2021-02-28 ASSESSMENT — MIFFLIN-ST. JEOR: SCORE: 1462.69

## 2021-02-28 NOTE — PROGRESS NOTES
02/28/21 1047   Quick Adds   Type of Visit Initial PT Evaluation   Living Environment   Living Environment Comments Pt lives in an apartment with roommate, 12 stairs to manage R railing. Ind with mobility at baseline.    Self-Care   Usual Activity Tolerance moderate   Current Activity Tolerance fair   Equipment Currently Used at Home none   Disability/Function   Fall history within last six months no   General Information   Onset of Illness/Injury or Date of Surgery 02/25/21   Referring Physician Jordan Moody MD   Patient/Family Therapy Goals Statement (PT) None stated   Pertinent History of Current Problem (include personal factors and/or comorbidities that impact the POC) Pt is a 55 year old female who was admitted on 2/25/2021 for severe hypothyroidism, normocytic anemia, chronic pain, UTI and chronic kidney disease.   Existing Precautions/Restrictions fall   Weight-Bearing Status - LLE full weight-bearing   Weight-Bearing Status - RLE full weight-bearing   Cognition   Orientation Status (Cognition) oriented x 4   Affect/Mental Status (Cognition) flat/blunted affect;low arousal/lethargic   Follows Commands (Cognition) WFL   Safety Deficit (Cognition) minimal deficit   Pain Assessment   Patient Currently in Pain No   Range of Motion (ROM)   ROM Comment B LE WFL   Strength   Strength Comments B LE functionally demonstrated >3/5 strength   Bed Mobility   Comment (Bed Mobility) SBA for sit <> supine   Transfers   Transfer Safety Comments CGA sit <> stand   Gait/Stairs (Locomotion)   Distance in Feet (Required for LE Total Joints) 16   Pattern (Gait) swing-through   Deviations/Abnormal Patterns (Gait) base of support, narrow;ataxic;gait speed decreased   Comment (Gait/Stairs) CGA w/o FWW; SBA with FWW; SBA with R railing   Balance   Balance Comments fair+ unsupported sitting; fair standing balance with FWW   Clinical Impression   Criteria for Skilled Therapeutic Intervention yes, treatment indicated    PT Diagnosis (PT) impaired functional mobility   Influenced by the following impairments impaired balance   Functional limitations due to impairments impaired transfers, ambulation, stairs   Clinical Presentation Stable/Uncomplicated   Clinical Presentation Rationale Pt is medically stable, functional status   Clinical Decision Making (Complexity) low complexity   Therapy Frequency (PT) Daily   Predicted Duration of Therapy Intervention (days/wks) 2 days   Planned Therapy Interventions (PT) balance training;bed mobility training;gait training;neuromuscular re-education;patient/family education;stair training;strengthening;transfer training   Anticipated Equipment Needs at Discharge (PT) walker, rolling   Risk & Benefits of therapy have been explained evaluation/treatment results reviewed;care plan/treatment goals reviewed;risks/benefits reviewed;current/potential barriers reviewed;participants voiced agreement with care plan;participants included;patient   PT Discharge Planning    PT Discharge Recommendation (DC Rec) home   PT Rationale for DC Rec Patient presenting close to baseline level of mobility, anticipate with continued medical management that patient will be safe to discharge home with use of FWW   PT Brief overview of current status  SBA with FWW   Total Evaluation Time   Total Evaluation Time (Minutes) 5

## 2021-02-28 NOTE — PLAN OF CARE
"PRIMARY DIAGNOSIS: \"GENERIC\" NURSING  OUTPATIENT/OBSERVATION GOALS TO BE MET BEFORE DISCHARGE:  1. ADLs back to baseline: No    2. Activity and level of assistance: Up with standby assistance.    3. Pain status: Improved-controlled with oral pain medications.  Received PO oxycodone and tylenol     4. Return to near baseline physical activity: No     Discharge Planner Nurse   Safe discharge environment identified: No  Barriers to discharge: Yes       Entered by: Lucio Taylor 02/27/2021 8:38 PM     Please review provider order for any additional goals.   Nurse to notify provider when observation goals have been met and patient is ready for discharge.    A/Ox4.  VSS.  Pt would desat when sleeping to 86-89%, placed on 1L O2 NC to maintain above 90%.  7/10 flank pain, PO tylenol did not help but PO oxycodone decreased the pain to 4/10.  LS dim, denies SOB.  Tele: SR, denies CP.  BLE +2 edema.  NS infusing at 75 ml.hr in R PIV.  2gm Na diet, good appetite.  Neph following.  Poss discharge 1-2 days.  Continue POC.    "

## 2021-02-28 NOTE — PROGRESS NOTES
"PRIMARY DIAGNOSIS: \"GENERIC\" NURSING  OUTPATIENT/OBSERVATION GOALS TO BE MET BEFORE DISCHARGE:  1. ADLs back to baseline: No    2. Activity and level of assistance: Up with standby assistance.    3. Pain status: Improved-controlled with oral pain medications.    4. Return to near baseline physical activity: No     Discharge Planner Nurse   Safe discharge environment identified: No  Barriers to discharge: Yes       Entered by: Lucio Taylor 02/27/2021 7:26 PM     Please review provider order for any additional goals.   Nurse to notify provider when observation goals have been met and patient is ready for discharge.  "

## 2021-02-28 NOTE — PROGRESS NOTES
" Renal Medicine Progress Note            Assessment/Plan:     55 y.o woman admitted with for severe hypothyroidism.      # Medullary sponge kidney, kidney stone and nephrocalcinosis.   # CKD IIIB: Baseline Scr ~ 1.4-1.5 mg/dl. Likely due to nephrocalcinosis. JULIEN component improved. SCr now at 1.26 mg/dl.   # Severe hypothyroidism due to not taking her synthroid  # Severe Fe def anemia: Getting IV FE  # Raynaud syndrome     Plan:  # Drink 3-4 liters of water daily for kidney stones and nephrocalcinosis. Follow-up with Dr. De La Cruz with [facility] in 1-2 weeks after discharge.         Interval History:     Afebrile. VSs. She is sitting in the chair. She feels better. No complaints.           Medications and Allergies:       gabapentin  300 mg Oral TID     iron sucrose (VENOFER) intermittent infusion  200 mg Intravenous Daily     levothyroxine  175 mcg Oral Daily     pantoprazole  40 mg Oral QAM AC     sertraline  150 mg Oral Daily     sodium chloride  1 spray Both Nostrils BID     sodium citrate-citric acid  30 mL Oral TID w/meals     tiZANidine  2 mg Oral TID        Allergies   Allergen Reactions     Penicillins Anaphylaxis     Ibuprofen Sodium      Levaquin      Sulfa Drugs             Physical Exam:   Vitals were reviewed   , Blood pressure 122/71, pulse 79, temperature 98.3  F (36.8  C), temperature source Axillary, resp. rate 18, height 1.676 m (5' 6\"), weight 85.6 kg (188 lb 11.2 oz), SpO2 94 %.    Wt Readings from Last 3 Encounters:   02/28/21 85.6 kg (188 lb 11.2 oz)   10/14/18 79.4 kg (175 lb)   04/11/17 74.8 kg (165 lb)       Intake/Output Summary (Last 24 hours) at 2/28/2021 1256  Last data filed at 2/28/2021 1155  Gross per 24 hour   Intake 1800 ml   Output 2000 ml   Net -200 ml       GENERAL APPEARANCE: NAD  HEENT:  Eyes/ears/nose/neck grossly normal  RESP: lungs cta b c good efforts, no crackles, rhonchi or wheezes  CV: RRR, nl S1/S2,   ABDOMEN: obese, soft, mild tenderness, no rebound or " guarding  EXTREMITIES/SKIN: no rashes/lesions on observed skin; no edema  NEURO: Slow. Awake and answering questions         Data:     CBC RESULTS:     Recent Labs   Lab 02/28/21  0712 02/27/21  0659 02/26/21  2143 02/26/21  1409 02/26/21  0702 02/25/21  1752   WBC 3.7* 3.7*  --   --  3.5* 4.5   RBC 3.19* 3.26*  --   --  3.23* 3.39*   HGB 9.4* 9.5* 9.1* 9.6* 9.4* 9.7*   HCT 31.7* 32.8*  --   --  31.5* 32.9*    216  --   --  235 253       Basic Metabolic Panel:  Recent Labs   Lab 02/28/21  0712 02/27/21  0659 02/26/21  0702 02/25/21  1752    137 137 137   POTASSIUM 4.2 4.9 3.9 3.9   CHLORIDE 104 106 105 103   CO2 31 31 28 28   BUN 17 22 26 28   CR 1.26* 1.52* 1.60* 1.64*   GLC 74 76 83 75   KATELYNN 8.0* 8.0* 8.3* 9.3       INR  Recent Labs   Lab 02/25/21  1752   INR 0.93      Attestation:   I have reviewed today's relevant vital signs, notes, medications, labs and imaging.    Shyam Ogden MD  [facility]  Office phone :[phone]  Pager: [phone]

## 2021-02-28 NOTE — PLAN OF CARE
"PRIMARY DIAGNOSIS: \"GENERIC\" NURSING  OUTPATIENT/OBSERVATION GOALS TO BE MET BEFORE DISCHARGE:  1. ADLs back to baseline: No    2. Activity and level of assistance: Pt did not get up during the shift     3. Pain status: Pt c/o of pain left abdominal; refused med; warm blanket given     4. Return to near baseline physical activity: No     Discharge Planner Nurse   Safe discharge environment identified: No  Barriers to discharge: Yes       Entered by: Julianna Miramontes 02/28/2021 5:59 AM   Pt A&Ox4, A1 walker & belt, 1.5L O2, LS dim, Tele SR, 2g NA diet, PIV (R) forearm, NS infusing 75 ml/hr. +2 BLE edema. Pt c/o of left abdominal pain NOC, no meds given per pt request, warm blanket was provided for comfort. /70 (BP Location: Left arm)   Pulse 67   Temp 98.2  F (36.8  C) (Oral)   Resp 18   Ht 1.676 m (5' 6\")   Wt 85.3 kg (188 lb)   SpO2 95%   BMI 30.34 kg/m       Pt voided @0600 Urine Characteristics: Red & small kidney stone was collected from the urine strainer.       Please review provider order for any additional goals.   Nurse to notify provider when observation goals have been met and patient is ready for discharge.  "

## 2021-02-28 NOTE — PROGRESS NOTES
"PRIMARY DIAGNOSIS: \"GENERIC\" NURSING  OUTPATIENT/OBSERVATION GOALS TO BE MET BEFORE DISCHARGE:  1. ADLs back to baseline: No    2. Activity and level of assistance: Up with maximum assistance. Consider SW and/or PT evaluation.     3. Pain status: Improved with use of alternative comfort measures i.e.: positioning    4. Return to near baseline physical activity: No     Discharge Planner Nurse   Safe discharge environment identified: No  Barriers to discharge: Yes       Entered by: Susy Castrejon 02/28/2021 11:05 AM     Please review provider order for any additional goals.   Nurse to notify provider when observation goals have been met and patient is ready for discharge.  "

## 2021-02-28 NOTE — DISCHARGE SUMMARY
"[facility]  Hospitalist Discharge Summary       Date of Admission:  2/25/2021  Date of Discharge:  2/28/2021  Discharging Provider: Jordan Moody MD      Discharge Diagnoses   Severe hypothyroidism  Normocytic anemia, possible iron deficiency,  CKD stage III with medullary sponge kidney and chronic nephrolithiasis      Follow-ups Needed After Discharge   Follow-up Appointments     Follow-up and recommended labs and tests       Follow up with your primary care doctor within one week for hospital   follow up.  Follow up with Urology (Dr. Rubén De La Cruz), in 1-2 weeks. Office phone   number [phone].             Unresulted Labs Ordered in the Past 30 Days of this Admission     Date and Time Order Name Status Description    2/27/2021 1201 Vitamin D In process     2/27/2021 0659 Vitamin D Deficiency In process       These results will be followed up by [facility]    Discharge Disposition   Discharged to home  Condition at discharge: Stable    History of Present Illness   Per admission H&P:  \"Faby Yeung is a 55 year old female  including raynaud's, GERD, hx of VTE, nephrolithiasis, prior hx of ankle surgeries, who presents with fatigue weakness swelling in her body hoarseness of her voice poor appetite poor energy.  She also been having abdominal pain for the past month radiating up to her back.  Patient notes her her urine is darker color today.  Patient states she is been very busy with life and thus she is been noncompliant with taking her levothyroxine she also states that her PCP has retired and she she did not call back to see a new physician for her levothyroxine prescription.  She is in profoundly weak inability to get up sleeping all day poor appetite poor energy loss of interest in daily activities.  She has noticed that her face is more swollen her voice is more hoarse her body seems more swollen, having dyspnea on exertion with activity.  Her abdominal pain is 7 out of 10 " "comes and goes sometimes constant sometimes radiates to her back left-sided sharp shooting.\"    Hospital Course   Faby Yeung is a 55 year old female who was admitted on 2/25/2021 for severe hypothyroidism, normocytic anemia, chronic pain, UTI and chronic kidney disease.     Patient treated for multiple issues outlined below.  Exhibited gradual improvement during hospital stay.  Was evaluated by PT on the day of discharge and felt to be appropriate for discharge to home.  Needs close follow-up with her PCP and urology.    Severe hypothyroidism  Patient admitted to being noncompliant in regards to her levothyroxine.  She complained of increasing lethargy, weakness, increased sleepiness.  Her TSH was found to be 186.  She was given one-time dose of IV levothyroxine 100 mcg.  She did not meet criteria for myxedema coma as she did not have hypercapnia, hyponatremia, nor hypothermia.  She previously was taking 150 mcg daily.  She will be discharged on 175 mcg.  She will need close follow-up with her primary care doctor to adjust dosages.  He was provided with a new prescription for levothyroxine.    Normocytic Anemia  Patient's hemoglobin was approximately 9 throughout the hospital stay.  There was never any sign of blood loss as the cause.  Her ferritin and iron sat index were on the low side but in the normal range.  Nephrology ordered IV Venofer.  B12 was within normal limits.  Can follow-up with nephrology for further evaluation.     Medullary sponge kidney, nephrolithiasis now with gross hematuria  Nephrolithiasis  Patient was complaining of flank pain on admission and CT of the abdomen/pelvis was obtained.  Bilateral nephrocalcinosis with several nonobstructing stones was seen.  She was given 1 dose of ceftriaxone empirically with pyuria but this was not continued as there was low suspicion for cystitis.  Nephrology was consulted.  It is recommended that the patient follows up with urology for management " of her kidney stones.  She did pass 1 stone while hospitalized.  She needs to drink 3 to 4 L of water per day to maintain hydration status.    Chronic kidney disease stage II versus JULIEN  Patient's creatinine was 1.6 on admission.  Last creatinine available 1.36 in October 2019.  Did not meet criteria for JULIEN.  Creatinine actually improved to 1.26 by the day of discharge.  Nephrology evaluated her for her renal insufficiency.  It is felt that her elevation in creatinine probably due to nephrocalcinosis.  Can follow-up with urology/nephrology for further evaluation.    Consultations This Hospital Stay   NEPHROLOGY IP CONSULT  PHYSICAL THERAPY ADULT IP CONSULT    Code Status   Full Code    Time Spent on this Encounter   I, Jordan Moody MD, personally saw the patient today and spent greater than 30 minutes discharging this patient.       Jordan Moody MD  [facility]  ______________________________________________________________________    Physical Exam   Vital Signs: Temp: 98.3  F (36.8  C) Temp src: Axillary BP: 122/71 Pulse: 79   Resp: 18 SpO2: 97 % O2 Device: Nasal cannula Oxygen Delivery: 1 LPM  Weight: 188 lbs 11.2 oz      General: No acute distress.    HEENT: No scleral icterus. Oropharynx moist.     Neck: Supple. Normal range of motion.    Pulmonary: Normal work of breathing. Clear to auscultation bilaterally.    Cardiovascular: Regular rate and rhythm without murmur or extra heart sounds.    Abdomen: Soft and non-tender.    Extremities: No peripheral edema. No clubbing.    Neurologic: Awake, alert, appropriate.    Skin: Warm and dry.    Psychiatric: Normal affect and mood.       Primary Care Physician   [facility]    Discharge Orders      Reason for your hospital stay    Severe hypothyroidism, kidney stones, anemia     Follow-up and recommended labs and tests     Follow up with your primary care doctor within one week for hospital follow up.  Follow up  with Urology (Dr. Rubén De La Cruz), in 1-2 weeks. Office phone number [phone].     Activity    Your activity upon discharge: As tolerated     Walker Order    DME Documentation:   Describe the reason for need to support medical necessity: impaired gait and mobility.     I, the undersigned, certify that the above prescribed supplies are medically necessary for this patient and is both reasonable and necessary in reference to accepted standards of medical and necessary in reference to accepted standards of medical practice in the treatment of this patient's condition and is not prescribed as a convenience.     Diet    Follow this diet upon discharge: Regular  Drink 3-4L water/day to help prevent worsening kidney stones       Significant Results and Procedures   Most Recent 3 CBC's:  Recent Labs   Lab Test 02/28/21  0712 02/27/21  0659 02/26/21  2143 02/26/21  0702 02/26/21  0702   WBC 3.7* 3.7*  --   --  3.5*   HGB 9.4* 9.5* 9.1*   < > 9.4*   MCV 99 101*  --   --  98    216  --   --  235    < > = values in this interval not displayed.     Most Recent 3 BMP's:  Recent Labs   Lab Test 02/28/21  0712 02/27/21  0659 02/26/21  0702    137 137   POTASSIUM 4.2 4.9 3.9   CHLORIDE 104 106 105   CO2 31 31 28   BUN 17 22 26   CR 1.26* 1.52* 1.60*   ANIONGAP 3 <1* 4   KATELYNN 8.0* 8.0* 8.3*   GLC 74 76 83     Most Recent 2 LFT's:  Recent Labs   Lab Test 02/27/21  0659 02/26/21  0702 02/25/21  1752   AST 57* 66* 76*   ALT  --  28 33   ALKPHOS  --  59 77   BILITOTAL  --  0.4 0.4     Most Recent 3 INR's:  Recent Labs   Lab Test 02/25/21  1752 07/15/13  0945 07/09/13  0825   INR 0.93 3.01* 2.35*   ,   Results for orders placed or performed during the hospital encounter of 02/25/21   CT Abdomen Pelvis w/o Contrast    Narrative    EXAM: CT ABDOMEN PELVIS W/O CONTRAST  LOCATION: [facility]  DATE/TIME: 2/25/2021 7:54 PM    INDICATION: Flank pain, recurrent stone disease suspected - left  COMPARISON:  10/14/2018  TECHNIQUE: CT scan of the abdomen and pelvis was performed without IV contrast. Multiplanar reformats were obtained. Dose reduction techniques were used.  CONTRAST: None.    FINDINGS:   LOWER CHEST: Chronic subpleural interstitial change in the visualized lower lungs.    HEPATOBILIARY: Calcified gallstone.    PANCREAS: Normal.    SPLEEN: Normal.    ADRENAL GLANDS: Normal.    KIDNEYS/BLADDER: Significant bilateral medullary nephrocalcinosis is again seen with a few small simple cysts. No follow-up is needed. Again seen is a 6 mm nonobstructing stone within the mid to lower right kidney with several smaller discrete stones in   the right kidney. Several nonobstructing stones in the left kidney largest measuring 4 mm.    BOWEL: Normal.    LYMPH NODES: Normal.    VASCULATURE: Unremarkable.    PELVIC ORGANS: Hysterectomy.    MUSCULOSKELETAL: Scoliosis.      Impression    IMPRESSION:   1.  Bilateral nephrocalcinosis with several nonobstructing stones in the kidneys. No ureteric stone or hydronephrosis.    2.  Small calcified gallstone.    3.  Hysterectomy.           Discharge Medications   Current Discharge Medication List      CONTINUE these medications which have CHANGED    Details   levothyroxine (SYNTHROID/LEVOTHROID) 175 MCG tablet Take 1 tablet (175 mcg) by mouth daily  Qty: 30 tablet, Refills: 0    Associated Diagnoses: Hypothyroidism, unspecified type         CONTINUE these medications which have NOT CHANGED    Details   gabapentin (NEURONTIN) 300 MG capsule Take 600 mg by mouth 3 times daily      LORazepam (ATIVAN) 1 MG tablet Take 1 mg by mouth every 6 hours as needed for anxiety DO NOT TAKE WITHIN 6 HOURS OF OXYCODONE      oxyCODONE-acetaminophen (PERCOCET) 5-325 MG tablet Take 1 tablet by mouth 3 times daily as needed for severe pain      pantoprazole (PROTONIX) 40 MG EC tablet Take 40 mg by mouth daily      polyethylene glycol (MIRALAX) 17 g packet Take 17 g by mouth daily as needed for  constipation      sertraline (ZOLOFT) 100 MG tablet Take 150 mg by mouth daily      sodium chloride (OCEAN) 0.65 % nasal spray Spray 1 spray into both nostrils 2 times daily      sodium citrate/citric acid (BICITRA) 500-334 MG/5ML SOLN solution Take 30 mLs by mouth 3 times daily (with meals) After meals           Allergies   Allergies   Allergen Reactions     Penicillins Anaphylaxis     Ibuprofen Sodium      Levaquin      Sulfa Drugs

## 2021-02-28 NOTE — PLAN OF CARE
"PRIMARY DIAGNOSIS: \"GENERIC\" NURSING  OUTPATIENT/OBSERVATION GOALS TO BE MET BEFORE DISCHARGE:  ADLs back to baseline: No    Activity and level of assistance: Up with standby assistance.    Pain status: Pt c/o pain left abdominal; pt refused meds; warm blanket given    Return to near baseline physical activity: No     Discharge Planner Nurse   Safe discharge environment identified: No  Barriers to discharge: Yes       Entered by: Julianna Miramontes 02/28/2021 5:56 AM     Please review provider order for any additional goals.   Nurse to notify provider when observation goals have been met and patient is ready for discharge.  "

## 2021-02-28 NOTE — PLAN OF CARE
"/71 (BP Location: Left arm)   Pulse 79   Temp 98.3  F (36.8  C) (Axillary)   Resp 18   Ht 1.676 m (5' 6\")   Wt 85.6 kg (188 lb 11.2 oz)   SpO2 94%   BMI 30.46 kg/m      A&O. A1 GBW. PT consulted and ordered walker for home discharge. Pt is very lethargic this shift. Tele is SR. On RA. Denies pain today. Still having hematuria. Creat 1.26, Neph following plan to follow up outpt once discharged. BLE 2+ and davie. Will continue POC.       PRIMARY DIAGNOSIS: \"GENERIC\" NURSING  OUTPATIENT/OBSERVATION GOALS TO BE MET BEFORE DISCHARGE:  1. ADLs back to baseline: No    2. Activity and level of assistance: Up with standby assistance.    3. Pain status: Improved with use of alternative comfort measures i.e.: positioning    4. Return to near baseline physical activity: No     Discharge Planner Nurse   Safe discharge environment identified: Yes  Barriers to discharge: No       Entered by: Susy Castrejon 02/28/2021 2:56 PM     Please review provider order for any additional goals.   Nurse to notify provider when observation goals have been met and patient is ready for discharge.  "

## 2021-02-28 NOTE — PLAN OF CARE
[unfilled]                                                                              [facility]      OUTPATIENT PHYSICAL THERAPY EVALUATION  PLAN OF TREATMENT FOR OUTPATIENT REHABILITATION  (COMPLETE FOR INITIAL CLAIMS ONLY)  Patient's Last Name, First Name, M.I.  YOB: 1965  Faby Pate                        Provider's Name  [facility] Medical Record No.  [id number]                               Onset Date:  02/25/21   Start of Care Date:    02/28/21     Type:     _X_PT   ___OT   ___SLP Medical Diagnosis:   severe hypothyroidism                        PT Diagnosis:  impaired functional mobility   Visits from SOC:  1   _________________________________________________________________________________  Plan of Treatment/Functional Goals    Planned Interventions: balance training, bed mobility training, gait training, neuromuscular re-education, patient/family education, stair training, strengthening, transfer training     Goals: See Physical Therapy Goals on Care Plan in [facility] electronic health record.    Therapy Frequency: Daily  Predicted Duration of Therapy Intervention: 2 days  _________________________________________________________________________________    I CERTIFY THE NEED FOR THESE SERVICES FURNISHED UNDER        THIS PLAN OF TREATMENT AND WHILE UNDER MY CARE     (Physician co-signature of this document indicates review and certification of the therapy plan).               ,      Referring Physician: Jordan Moody MD            Initial Assessment        See Physical Therapy evaluation dated   in Epic electronic health record.

## 2021-03-01 LAB
BLD PROD TYP BPU: NORMAL
BLD UNIT ID BPU: 0
BLOOD PRODUCT CODE: NORMAL
BPU ID: NORMAL
DEPRECATED CALCIDIOL+CALCIFEROL SERPL-MC: 13 UG/L (ref 20–75)
DEPRECATED CALCIDIOL+CALCIFEROL SERPL-MC: 13 UG/L (ref 20–75)
TRANSFUSION STATUS PATIENT QL: NORMAL
TRANSFUSION STATUS PATIENT QL: NORMAL

## 2021-03-01 NOTE — PLAN OF CARE
Pt A&O x4, stable and ready for discharge. VSS, discharge instructions, signs and symptoms to report, new medication, diet activity, and F/U appt reviewed with pt. Education on medication provided.   All questions answered and verbalized understanding.   IV out and belongings sent home with pt.

## 2021-03-01 NOTE — PLAN OF CARE
Physical Therapy Discharge Summary    Reason for therapy discharge:    Discharged to home.    Progress towards therapy goal(s). See goals on Care Plan in [facility] electronic health record for goal details.  Goals not met.  Barriers to achieving goals:   discharge from facility.    Therapy recommendation(s):    No further therapy is recommended.

## 2021-03-03 ENCOUNTER — COMMUNICATION - HEALTHEAST (OUTPATIENT)
Dept: UROLOGY | Facility: CLINIC | Age: 56
End: 2021-03-03

## 2021-03-03 DIAGNOSIS — Z87.442 HISTORY OF KIDNEY STONES: ICD-10-CM

## 2021-03-14 ENCOUNTER — HEALTH MAINTENANCE LETTER (OUTPATIENT)
Age: 56
End: 2021-03-14

## 2021-06-01 ENCOUNTER — RECORDS - HEALTHEAST (OUTPATIENT)
Dept: ADMINISTRATIVE | Facility: CLINIC | Age: 56
End: 2021-06-01

## 2021-07-20 VITALS
TEMPERATURE: 97.7 F | DIASTOLIC BLOOD PRESSURE: 86 MMHG | HEART RATE: 85 BPM | TEMPERATURE: 97.7 F | HEART RATE: 83 BPM | DIASTOLIC BLOOD PRESSURE: 90 MMHG | SYSTOLIC BLOOD PRESSURE: 128 MMHG | SYSTOLIC BLOOD PRESSURE: 141 MMHG

## 2021-07-20 NOTE — TELEPHONE ENCOUNTER
Patient called stating that she is having blood in her urine and she feels as if she has a urinary tract infection.  She will drop off a urine at [facility] lab.  She is requesting pain medication and antibiotics as advised.  Writer explained that we would need to do a urine test before an antibiotic, but that I would send her request along.  Radha Bennett RN

## 2021-07-20 NOTE — TELEPHONE ENCOUNTER
Patient states she is having hematuria that goes from light to dark throughout the day. She is having some burning  Upon urination as well and L sided pain which she is treating with tylenol. Spoke with PA and patient instructed to drop off a urine for culture today at the hospital and we will notify patient  of the results next week. Also, instructed patient if symptoms of hematuria and burning worsen or she develops a fever, she will need to be seen by the ED. Patient verbalized understanding.

## 2021-07-20 NOTE — PROGRESS NOTES
Assessment/Plan:        Diagnoses and all orders for this visit:    Nephrocalcinosis  -     Culture, Urine  -     CT Abdomen Pelvis Without Oral Without IV Contrast; Future; Expected date: 7/30/17  -     Stone Formation, 24 Hour Urine x1; Future; Expected date: 2/13/17    Urinary calculus, unspecified  -     POCT urinalysis dipstick-[facility]ospitals [facility]    Other orders  -     gabapentin (NEURONTIN) 300 MG capsule; Take 300 mg by mouth.      Stone Management Plan  hospitals Stone Management 4/14/2016 5/25/2016 1/30/2017   Urinary Tract Infection Possible Infection No suspicion of infection Possible Infection   Renal Colic Asymptomatic at this time Asymptomatic at this time Well controlled symptoms   Renal Failure No suspicion of renal failure No suspicion of renal failure No suspicion of renal failure   Current CT date - 5/25/2016 1/30/2017   Right sided stones? - Yes Yes   R Number of ureteral stones - No ureteral stones No ureteral stones   R Number of kidney stones  - - Renal stones unchanged from last exam   R GSD of kidney stones - 4 - 10 -   R Hydronephrosis - None -   R Stone Event Established event Resolved event No current event   Diagnosis date - - -   Initial location of primary symptomatic stone - - -   Initial GSD of primary symptomatic stone - - -   Resolved date - 5/25/2016 -   R Post-op status Stent Removal >4 mm -   R Current Plan - Observe Observe   Clear rationale - - -   Observe rationale - - Significant stone burden amenable to emergency management   Left sided stones? - Yes Yes   L Number of ureteral stones - No ureteral stones No ureteral stones   L GSD of ureteral stones - - -   L Location of ureteral stone - - -   L Number of kidney stones  - - Renal stones unchanged from last exam   L GSD of kidney stones - 2 - 4 -   L Hydronephrosis - None -   L Stone Event Established event No current event No current event   Diagnosis date - - -   Initial location of primary symptomatic stone - - -   Initial GSD of  primary symptomatic stone - - -   Post-op status Pending imaging 2 - 4 mm -   L Current Plan - Observe Observe   Drain rationale - - -   Observe rationale - - Significant stone burden amenable to emergency management             Subjective:      HPI  Ms. Faby Rodriguez is a 51 y.o.  female returning to the [facility] for follow up of her stone disease.    She has been feeling poorly over the last week. She has had bilateral flank pain (right > left). She thinks she has an obstructing stone. She may have had a fever last week. She has been requiring pain meds. She has a significant history of severe nephrocalcinosis and recurrent UTI.    CT is obtained and on personal review demonstrates stable severe medullary nephrocalcinosis but no evidence of obstruction or ureteral stone.    She is reassured. Urine is sent for culture. She is overdue for 24 hour urine collection so will re-invigorate that effort.     ROS   Review of systems is negative except for HPI.    Past Medical History   Diagnosis Date     Anemia      Astigmatism      Bladder infection      Chronic pain syndrome      DJD (degenerative joint disease), cervical      pain radiates to left shoulder     Dysfunctional uterine bleeding      Hx of nephrolithotomy with removal of calculi 2015     Hyperopia      Hypothyroidism 2015     Kidney stone      Medullary sponge kidney      Presbyopia      Raynaud's disease      Reactive depression      Reflux        Past Surgical History   Procedure Laterality Date      section, classic       Pr anesth,repair upper abd hernia nos       Hysterectomy       Appendectomy       Small intestine surgery       Ureteroscopy       Lithotripsy       Nephrolithotomy Right      Cystoscopy w/ ureteral stent placement Left 3/8/2016     Procedure: CYSTOSCOPY LEFT STENT INSERTION;  Surgeon: Rubén De La Cruz MD;  Location: [facility];  Service:      Surgical manipulation  "of ankle joint Left      hardware put in     Carpal tunnel release Bilateral      Scoliosis surgery  X2     hardware removal     Ankle surgery Left x3     including debridement, hardware removal     Ankle surgery Left 2016       Current Outpatient Prescriptions   Medication Sig Dispense Refill     citric acid-sodium citrate (BICITRA) 500-334 mg/5 mL solution Take 30 mL by mouth 3 (three) times a day with meals.       gabapentin (NEURONTIN) 300 MG capsule Take 300 mg by mouth.       levothyroxine (SYNTHROID, LEVOTHROID) 150 MCG tablet Take 150 mcg by mouth Daily at 6:00 am.       LORazepam (ATIVAN) 1 MG tablet Take 1 mg by mouth 3 (three) times a day.        oxyCODONE-acetaminophen (PERCOCET)  mg per tablet 1-2 tablets every 6 hours as needed.       pantoprazole (PROTONIX) 40 MG tablet Take 40 mg by mouth daily as needed (heartburn).        polyethylene glycol (MIRALAX) 17 gram packet Take 17 g by mouth daily as needed (constipation).        sertraline (ZOLOFT) 100 MG tablet Take 150 mg by mouth every morning.        No current facility-administered medications for this visit.        Allergies   Allergen Reactions     Penicillins Hives and Shortness Of Breath     Tolerated Ancef 8/25/15     Ibuprofen Other (See Comments)     Bleeding ulcers     Macrobid [Nitrofurantoin Monohyd/M-Cryst] Hives     Sulfa (Sulfonamide Antibiotics) Unknown     Hydroxychloroquine Rash     Diffuse drug rash noted within 4 weeks of therapy. Stopped 11/25/15.\" Plaquenil\"     Levaquin [Levofloxacin] Rash     Arm swelling       Social History     Social History     Marital status: Single     Spouse name: N/A     Number of children: N/A     Years of education: N/A     Occupational History     Not on file.     Social History Main Topics     Smoking status: Former Smoker     Years: 15.00     Types: Cigarettes     Quit date: 2/12/2016     Smokeless tobacco: Never Used      Comment: 1 pack every 3 weeks     Alcohol use 0.6 oz/week     1 " Glasses of wine per week      Comment: rarely     Drug use: No     Sexual activity: Not on file     Other Topics Concern     Not on file     Social History Narrative       Family History   Problem Relation Age of Onset     Cirrhosis Sister      Cancer Father      ESOPHAGEAL     Cancer Maternal Grandmother      LUNG     Cancer Maternal Grandfather      BRAIN TUMOR     Anesthesia problems Neg Hx      Objective:      Physical Exam  Vitals:    01/30/17 1344   BP: 121/79   Pulse: 97   Temp: 98.8  F (37.1  C)     General - well developed, well nourished, appropriate for age. Appears no distress at this time  Abdomen - moderately obese soft, non-tender, no hepatosplenomegaly, no masses.   - right flank mild tenderness, no suprapubic tenderness, kidney and bladder non-palpable  MSK - normal spinal curvature. no spinal tenderness. normal gait. muscular strength intact.  Psych - oriented to time, place, and person, normal mood and affect.      Labs   Urinalysis POC (Office):  BLOOD UA   Date Value Ref Range Status   01/30/2017 Trace Negative Final   05/25/2016 Small Negative Final   04/14/2016 Moderate Negative Final     NITRITE, UA   Date Value Ref Range Status   01/30/2017 Negative Negative Final   05/25/2016 Negative Negative Final   04/14/2016 Negative Negative Final   07/27/2015 Negative Negative Final     LEUKOCYTES, UA    Date Value Ref Range Status   01/30/2017 Negative Negative Final   05/25/2016 Trace (!) Negative Final   04/14/2016 Small (!) Negative Final     PH UA   Date Value Ref Range Status   01/30/2017 7.0 4.5 - 8.0 Final   05/25/2016 7.0 4.5 - 8.0 Final   04/14/2016 7.0 4.5 - 8.0 Final       Lab Urinalysis:  BLOOD, UA   Date Value Ref Range Status   07/27/2015 Large (!) Negative Final     NITRITE, UA   Date Value Ref Range Status   01/30/2017 Negative Negative Final   05/25/2016 Negative Negative Final   04/14/2016 Negative Negative Final   07/27/2015 Negative Negative Final     LEUKOCYTES, UA   Date Value  Ref Range Status   07/27/2015 Large (!) Negative Final     PH, UA   Date Value Ref Range Status   07/27/2015 6.5 4.5 - 8.0 Final

## 2021-07-20 NOTE — PROGRESS NOTES
Assessment/Plan:        Diagnoses and all orders for this visit:    Pyelonephritis  -     cephalexin (KEFLEX) 500 MG capsule; Take 1 capsule (500 mg total) by mouth 4 (four) times a day for 10 days.  Dispense: 40 capsule; Refill: 0  -     Renal Function Profile- Today; Future; Expected date: 10/15/2019  -     C-Reactive Protein- Today; Future; Expected date: 10/15/2019  -     HM1(CBC and Differential)- Today; Future; Expected date: 10/15/2019  -     Renal Function Profile- Today  -     C-Reactive Protein- Today  -     HM1(CBC and Differential)- Today  -     HM1 (CBC with Diff)  -     oxyCODONE (ROXICODONE) 5 MG immediate release tablet; Take 1-2 tablets (5-10 mg total) by mouth every 6 (six) hours as needed for pain.  Dispense: 15 tablet; Refill: 0    Flank pain  -     Urinalysis Macroscopic  -     Culture, Urine- Future; Future; Expected date: 11/14/2019  -     Culture, Urine- Future  -     cephalexin (KEFLEX) 500 MG capsule; Take 1 capsule (500 mg total) by mouth 4 (four) times a day for 10 days.  Dispense: 40 capsule; Refill: 0      Stone Management Plan  [facility] 5/25/2016 1/30/2017 10/15/2019   Urinary Tract Infection No suspicion of infection Possible Infection Suspected Infection   Renal Colic Asymptomatic at this time Well controlled symptoms Well controlled symptoms   Renal Failure No suspicion of renal failure No suspicion of renal failure No suspicion of renal failure   Current CT date 5/25/2016 1/30/2017 10/15/2019   Right sided stones? Yes Yes Yes   R Number of ureteral stones No ureteral stones No ureteral stones No ureteral stones   R Number of kidney stones  - Renal stones unchanged from last exam Renal stones unchanged from last exam   R GSD of kidney stones 4 - 10 - 4 - 10   R Hydronephrosis None - None   R Stone Event Resolved event No current event No current event   Diagnosis date - - -   Initial location of primary symptomatic stone - - -   Initial GSD of primary symptomatic stone -  - -   Resolved date 5/25/2016 - -   R Post-op status >4 mm - -   R Current Plan Observe Observe Observe   Clear rationale - - -   Observe rationale - Significant stone burden amenable to emergency management Other   Left sided stones? Yes Yes Yes   L Number of ureteral stones No ureteral stones No ureteral stones No ureteral stones   L GSD of ureteral stones - - -   L Location of ureteral stone - - -   L Number of kidney stones  - Renal stones unchanged from last exam Renal stones unchanged from last exam   L GSD of kidney stones 2 - 4 - 4 - 10   L Hydronephrosis None - None   L Stone Event No current event No current event No current event   Diagnosis date - - -   Initial location of primary symptomatic stone - - -   Initial GSD of primary symptomatic stone - - -   Post-op status 2 - 4 mm - -   L Current Plan Observe Observe Observe   Drain rationale - - -   Observe rationale - Significant stone burden amenable to emergency management Other         Subjective:      HPI  Ms. Faby Rodriguez is a 54 y.o.  female returning to the [facility] for unanticipated visit with acute exacerbation of chronic stone disease.      She is a rapidly recurrent calcium oxalate, struvite and calcium carbonate stone former who has required numerous stone clearance procedures. She has not previously participated in stone risk evaluation. She has been maintained on bicitra. She has identified non-modifiable stone risks including:  bilateral stones and nephrocalcinosis.    She comes in today for 2 week history of gross hematuria. She started to experience left flank pain 2 days ago. She notes associated fever (102.7 F) yesterday. She has not taken any medications for symptoms. She was last seen in 2016 and denies any stone events or UTI's in the interval.    Significant current symptoms include:  left flank pain. Pertinent negative current symptoms include:  fever, chills, right flank pain, nausea,  vomiting, hematuria, urinary frequency and dysuria.    CT scan from today is personally reviewed and demonstrates no current hydronephrosis or ureteral stones. Stable, bilateral nephrocalcinosis.    Significant labs from presentation include severe hematuria, mild pyuria, positive nitrite, no bacteria, normal WBC, normal C reactive protein, slightly elevated creatinine and normal potassium.    PLAN    55 yo F with hx of active struvite stone disease, UTI's and bilateral nephrocalcinosis. Acute left renal colic with nitrite positive urine. Stable, non-obstructing bilateral nephrocalcinosis.    Will initiate keflex for left pyelonephritis. Culture ordered and will follow results accordingly. Patient has multiple allergies but has tolerated keflex in past.    Patient also seen and examined by Cata Wiseman PA-C     ROS   A 12 point comprehensive review of systems is negative except for HPI.    Past Medical History:   Diagnosis Date     Anemia      Astigmatism      Bladder infection      Chronic pain syndrome      DJD (degenerative joint disease), cervical     pain radiates to left shoulder     Dysfunctional uterine bleeding      Hx of nephrolithotomy with removal of calculi 2015     Hyperopia      Hypothyroidism 2015     Kidney stone      Medullary sponge kidney      Presbyopia      Raynaud's disease      Reactive depression      Reflux      Past Surgical History:   Procedure Laterality Date     ANKLE SURGERY Left x3    including debridement, hardware removal     ANKLE SURGERY Left 2016     APPENDECTOMY       CARPAL TUNNEL RELEASE Bilateral       SECTION, CLASSIC       CYSTOSCOPY W/ URETERAL STENT PLACEMENT Left 3/8/2016    Procedure: CYSTOSCOPY LEFT STENT INSERTION;  Surgeon: Rubén De La Cruz MD;  Location: [facility] OR;  Service:      HYSTERECTOMY       LITHOTRIPSY       NEPHROLITHOTOMY Right      [place] ANESTH,REPAIR UPPER ABD HERNIA NOS       SCOLIOSIS SURGERY  X2    hardware removal      "SMALL INTESTINE SURGERY       SURGICAL MANIPULATION OF ANKLE JOINT Left     hardware put in     URETEROSCOPY         Current Outpatient Medications   Medication Sig Dispense Refill     cephalexin (KEFLEX) 500 MG capsule Take 1 capsule (500 mg total) by mouth 4 (four) times a day for 10 days. 40 capsule 0     citric acid-sodium citrate (BICITRA) 500-334 mg/5 mL solution Take 30 mL by mouth 3 (three) times a day with meals.       gabapentin (NEURONTIN) 300 MG capsule Take 300 mg by mouth.       levothyroxine (SYNTHROID, LEVOTHROID) 150 MCG tablet Take 150 mcg by mouth Daily at 6:00 am.       LORazepam (ATIVAN) 1 MG tablet Take 1 mg by mouth 3 (three) times a day.        oxyCODONE (ROXICODONE) 5 MG immediate release tablet Take 1-2 tablets (5-10 mg total) by mouth every 6 (six) hours as needed for pain. 15 tablet 0     pantoprazole (PROTONIX) 40 MG tablet Take 40 mg by mouth daily as needed (heartburn).        polyethylene glycol (MIRALAX) 17 gram packet Take 17 g by mouth daily as needed (constipation).        sertraline (ZOLOFT) 100 MG tablet Take 150 mg by mouth every morning.        No current facility-administered medications for this visit.        Allergies   Allergen Reactions     Penicillins Hives and Shortness Of Breath     Tolerated Ancef 8/25/15     Ibuprofen Other (See Comments)     Bleeding ulcers     Macrobid [Nitrofurantoin Monohyd/M-Cryst] Hives     Sulfa (Sulfonamide Antibiotics) Unknown     Hydroxychloroquine Rash     Diffuse drug rash noted within 4 weeks of therapy. Stopped 11/25/15.\" Plaquenil\"     Levaquin [Levofloxacin] Rash     Arm swelling       Social History     Socioeconomic History     Marital status: Single     Spouse name: Not on file     Number of children: Not on file     Years of education: Not on file     Highest education level: Not on file   Occupational History     Occupation: Unemployed   Social Needs     Financial resource strain: Not on file     Food insecurity:     Worry: Not " on file     Inability: Not on file     Transportation needs:     Medical: Not on file     Non-medical: Not on file   Tobacco Use     Smoking status: Former Smoker     Years: 15.00     Types: Cigarettes     Last attempt to quit: 2/12/2016     Years since quitting: 3.6     Smokeless tobacco: Never Used     Tobacco comment: 1 pack every 3 weeks   Substance and Sexual Activity     Alcohol use: Yes     Alcohol/week: 1.0 standard drinks     Types: 1 Glasses of wine per week     Comment: rarely     Drug use: No     Sexual activity: Not on file   Lifestyle     Physical activity:     Days per week: Not on file     Minutes per session: Not on file     Stress: Not on file   Relationships     Social connections:     Talks on phone: Not on file     Gets together: Not on file     Attends Judaism service: Not on file     Active member of club or organization: Not on file     Attends meetings of clubs or organizations: Not on file     Relationship status: Not on file     Intimate partner violence:     Fear of current or ex partner: Not on file     Emotionally abused: Not on file     Physically abused: Not on file     Forced sexual activity: Not on file   Other Topics Concern     Not on file   Social History Narrative     Not on file       Family History   Problem Relation Age of Onset     Cirrhosis Sister      Cancer Father         ESOPHAGEAL     Cancer Maternal Grandmother         LUNG     Cancer Maternal Grandfather         BRAIN TUMOR     Anesthesia problems Neg Hx      Objective:      Physical Exam  Vitals:    10/15/19 1205   BP: 128/86   Pulse: 83   Temp: 97.7  F (36.5  C)     General - well developed, well nourished, appropriate for age. Appears fatigued   Heart - regular rate and rhythm, no murmur  Respiratory - normal effort, clear to auscultation, good air entry without adventitious noises  Abdomen - mildly obese soft, non-tender, no hepatosplenomegaly, no masses.   - left flank  mild tenderness, no suprapubic  tenderness, kidney and bladder non-palpable  MSK - normal spinal curvature. no spinal tenderness. normal gait. muscular strength intact.  Neurology - cranial nerves II-XII grossly intact, normal sensation, no unsteadiness  Skin - intact, no bruising, no gouty tophi  Psych - oriented to time, place, and person, normal mood and affect.      Labs   Urinalysis POC (Office):  Blood UA   Date Value Ref Range Status   01/30/2017 Trace Negative Final   05/25/2016 Small Negative Final   04/14/2016 Moderate Negative Final     Nitrite, UA   Date Value Ref Range Status   10/15/2019 Positive (!) Negative Final   01/30/2017 Negative Negative Final   05/25/2016 Negative Negative Final   04/14/2016 Negative Negative Final   07/27/2015 Negative Negative Final     Leukocytes, UA    Date Value Ref Range Status   01/30/2017 Negative Negative Final   05/25/2016 Trace (!) Negative Final   04/14/2016 Small (!) Negative Final     pH UA   Date Value Ref Range Status   01/30/2017 7.0 4.5 - 8.0 Final   05/25/2016 7.0 4.5 - 8.0 Final   04/14/2016 7.0 4.5 - 8.0 Final       Lab Urinalysis:  Blood, UA   Date Value Ref Range Status   10/15/2019 Large (!) Negative Final   07/27/2015 Large (!) Negative Final     Nitrite, UA   Date Value Ref Range Status   10/15/2019 Positive (!) Negative Final   01/30/2017 Negative Negative Final   05/25/2016 Negative Negative Final   04/14/2016 Negative Negative Final   07/27/2015 Negative Negative Final     Leukocytes, UA   Date Value Ref Range Status   10/15/2019 Small (!) Negative Final   07/27/2015 Large (!) Negative Final     pH, UA   Date Value Ref Range Status   10/15/2019 7.0 5.0 - 8.0 Final   07/27/2015 6.5 4.5 - 8.0 Final    and Acute Labs   CBC   WBC   Date Value Ref Range Status   10/15/2019 5.3 4.0 - 11.0 thou/uL Final   04/05/2016 5.9 4.0 - 11.0 thou/uL Final   03/22/2016 3.6 (L) 4.0 - 11.0 thou/uL Final   08/25/2015 4.4 4.0 - 11.0 thou/uL Final     Hemoglobin   Date Value Ref Range Status    10/15/2019 12.5 12.0 - 16.0 g/dL Final   04/06/2016 10.4 (L) 12.0 - 16.0 g/dL Final   04/05/2016 11.9 (L) 12.0 - 16.0 g/dL Final     Platelets   Date Value Ref Range Status   10/15/2019 298 140 - 440 thou/uL Final   04/05/2016 334 140 - 440 thou/uL Final   03/22/2016 244 140 - 440 thou/uL Final   , C Reactive Protein    CRP   Date Value Ref Range Status   10/15/2019 0.7 0.0 - 0.8 mg/dL Final   , Renal Panel  [facility]  Creatinine   Date Value Ref Range Status   10/15/2019 1.36 (H) 0.60 - 1.10 mg/dL Final   04/06/2016 0.95 0.60 - 1.10 mg/dL Final   04/05/2016 1.08 0.60 - 1.10 mg/dL Final     Potassium   Date Value Ref Range Status   10/15/2019 5.0 3.5 - 5.0 mmol/L Final   04/06/2016 4.9 3.5 - 5.0 mmol/L Final   04/05/2016 3.9 3.5 - 5.0 mmol/L Final     Calcium   Date Value Ref Range Status   10/15/2019 10.0 8.5 - 10.5 mg/dL Final   04/06/2016 8.6 8.5 - 10.5 mg/dL Final   04/05/2016 9.7 8.5 - 10.5 mg/dL Final    and Urine Culture    Culture   Date Value Ref Range Status   10/15/2019 >100,000 col/ml Escherichia coli (!)  Preliminary   01/30/2017 No Growth  Final   04/14/2016 No Growth  Final

## 2021-07-20 NOTE — PROGRESS NOTES
Patient presents to the office today for evaluation of acute symptoms of flank pain and hematuria.  Radha Bennett RN

## 2021-07-20 NOTE — TELEPHONE ENCOUNTER
Patient called stating that she had a urine culture done through her [facility] which is positive for E Coli infection.  She also is having hematuria and right flank pain.  Will talk with provider regarding treatment and plan.  Last ov 11/26/2019.  Radha Bennett RN    Per Dr De La Cruz, patient to start keflex for a week and will need a f/u CT scan.  Patient advised and will start the medication.  She will call [facility] for her CT scan and will call us back to let us know when her scan is, so a f/u telephone visit can be done.  Radha Bennett RN

## 2021-07-20 NOTE — TELEPHONE ENCOUNTER
Patient is on appropriate antibiotic for confirmed e. Coli UTI    She needs to finish 10 day course of keflex and return with updated urine culture in ~ 1 month.     Patient understands and agrees with plan.

## 2021-07-20 NOTE — PROGRESS NOTES
Assessment/Plan:        Diagnoses and all orders for this visit:    History of UTI  -     Urinalysis Macroscopic  -     cephalexin (KEFLEX) 500 MG capsule; Take 1 capsule (500 mg total) by mouth 4 (four) times a day for 7 days.  Dispense: 28 capsule; Refill: 4  -     oxyCODONE (ROXICODONE) 5 MG immediate release tablet; Take 1 tablet (5 mg total) by mouth every 6 (six) hours as needed for pain.  Dispense: 12 tablet; Refill: 0    Dysuria  -     Culture, Urine- Future  -     cephalexin (KEFLEX) 500 MG capsule; Take 1 capsule (500 mg total) by mouth 4 (four) times a day for 7 days.  Dispense: 28 capsule; Refill: 4  -     oxyCODONE (ROXICODONE) 5 MG immediate release tablet; Take 1 tablet (5 mg total) by mouth every 6 (six) hours as needed for pain.  Dispense: 12 tablet; Refill: 0    Urinary tract infection with pyuria  -     cephalexin (KEFLEX) 500 MG capsule; Take 1 capsule (500 mg total) by mouth 4 (four) times a day for 7 days.  Dispense: 28 capsule; Refill: 4  -     oxyCODONE (ROXICODONE) 5 MG immediate release tablet; Take 1 tablet (5 mg total) by mouth every 6 (six) hours as needed for pain.  Dispense: 12 tablet; Refill: 0      Stone Management Plan  [facility] Stone Management 1/30/2017 10/15/2019 11/26/2019   Urinary Tract Infection Possible Infection Suspected Infection Suspected Infection   Renal Colic Well controlled symptoms Well controlled symptoms -   Renal Failure No suspicion of renal failure No suspicion of renal failure Chronic renal failure   Chronic Renal Failure - - 30-60ml/min/1.73m2   Current CT date 1/30/2017 10/15/2019 -   Right sided stones? Yes Yes -   R Number of ureteral stones No ureteral stones No ureteral stones -   R Number of kidney stones  Renal stones unchanged from last exam Renal stones unchanged from last exam -   R GSD of kidney stones - 4 - 10 -   R Hydronephrosis - None -   R Stone Event No current event No current event No current event   Diagnosis date - - -   Initial location of  primary symptomatic stone - - -   Initial GSD of primary symptomatic stone - - -   Resolved date - - -   R Post-op status - - -   R Current Plan Observe Observe -   Clear rationale - - -   Observe rationale Significant stone burden amenable to emergency management Other -   Left sided stones? Yes Yes -   L Number of ureteral stones No ureteral stones No ureteral stones -   L GSD of ureteral stones - - -   L Location of ureteral stone - - -   L Number of kidney stones  Renal stones unchanged from last exam Renal stones unchanged from last exam -   L GSD of kidney stones - 4 - 10 -   L Hydronephrosis - None -   L Stone Event No current event No current event No current event   Diagnosis date - - -   Initial location of primary symptomatic stone - - -   Initial GSD of primary symptomatic stone - - -   Post-op status - - -   L Current Plan Observe Observe -   Drain rationale - - -   Observe rationale Significant stone burden amenable to emergency management Other -             Subjective:      HPI  Ms. Faby Rodriguez is a 54 y.o.  female returning to the [facility] for follow-up urinary tract infection diagnosed 10/15 2019 100,000 colonies of E. coli pansensitive except to tetracycline where there was resistance.  Patient was started on Keflex for 10 days to have a repeat urine culture when done.  Patient took the Keflex for 10 days and then had urine culture done in another health system.  We did not get results until we found them today which showed again E. coli 100,000 pansensitive at this time.    Patient comes in continuing to have urgency and frequency periodic left flank pain.  She has known nephrocalcinosis.  She has intermittent flank discomfort left as well as right and sometimes bilateral.  She has had no fever.    UA today urine brown specific gravity 1.020 pH 7 large blood positive nitrate small leukocyte.     urine culture was submitted.    Patient is allergic to  penicillins Macrobid sulfa Levaquin.  She tolerates Keflex.    Impression recurrent UTIs bilateral nephrocalcinosis    Plan urine culture started on Keflex 500 p.o. 4 times daily 2 weeks have patient return to clinic 2 weeks for follow-up culture.  Patient may require longer term use of antibiotic.  If she has infected stones this may not be successful.    Patient requested oxycodone.  Gave her 12 tablets and she did not wish to him Vicodin because it causes her to itch.       ROS   Review of systems is negative except for HPI.    Past Medical History:   Diagnosis Date     Anemia      Astigmatism      Bladder infection      Chronic pain syndrome      DJD (degenerative joint disease), cervical     pain radiates to left shoulder     Dysfunctional uterine bleeding      Hx of nephrolithotomy with removal of calculi 2015     Hyperopia      Hypothyroidism 2015     Kidney stone      Medullary sponge kidney      Presbyopia      Raynaud's disease      Reactive depression      Reflux        Past Surgical History:   Procedure Laterality Date     ANKLE SURGERY Left x3    including debridement, hardware removal     ANKLE SURGERY Left 2016     APPENDECTOMY       CARPAL TUNNEL RELEASE Bilateral       SECTION, CLASSIC       CYSTOSCOPY W/ URETERAL STENT PLACEMENT Left 3/8/2016    Procedure: CYSTOSCOPY LEFT STENT INSERTION;  Surgeon: Rubén De La Cruz MD;  Location: [facility];  Service:      HYSTERECTOMY       LITHOTRIPSY       NEPHROLITHOTOMY Right 2015     NM ANESTH,REPAIR UPPER ABD HERNIA NOS       SCOLIOSIS SURGERY  X2    hardware removal     SMALL INTESTINE SURGERY       SURGICAL MANIPULATION OF ANKLE JOINT Left     hardware put in     URETEROSCOPY         Current Outpatient Medications   Medication Sig Dispense Refill     citric acid-sodium citrate (BICITRA) 500-334 mg/5 mL solution Take 30 mL by mouth 3 (three) times a day with meals.       gabapentin (NEURONTIN) 300 MG capsule Take 300 mg by mouth.  "      levothyroxine (SYNTHROID, LEVOTHROID) 150 MCG tablet Take 150 mcg by mouth Daily at 6:00 am.       LORazepam (ATIVAN) 1 MG tablet Take 1 mg by mouth 3 (three) times a day.        pantoprazole (PROTONIX) 40 MG tablet Take 40 mg by mouth daily as needed (heartburn).        polyethylene glycol (MIRALAX) 17 gram packet Take 17 g by mouth daily as needed (constipation).        sertraline (ZOLOFT) 100 MG tablet Take 150 mg by mouth every morning.        cephalexin (KEFLEX) 500 MG capsule Take 1 capsule (500 mg total) by mouth 4 (four) times a day for 7 days. 28 capsule 4     oxyCODONE (ROXICODONE) 5 MG immediate release tablet Take 1 tablet (5 mg total) by mouth every 6 (six) hours as needed for pain. 12 tablet 0     No current facility-administered medications for this visit.        Allergies   Allergen Reactions     Penicillins Hives and Shortness Of Breath     Tolerated Ancef 8/25/15     Ibuprofen Other (See Comments)     Bleeding ulcers     Macrobid [Nitrofurantoin Monohyd/M-Cryst] Hives     Sulfa (Sulfonamide Antibiotics) Unknown     Hydroxychloroquine Rash     Diffuse drug rash noted within 4 weeks of therapy. Stopped 11/25/15.\" Plaquenil\"     Levaquin [Levofloxacin] Rash     Arm swelling       Social History     Socioeconomic History     Marital status: Single     Spouse name: Not on file     Number of children: Not on file     Years of education: Not on file     Highest education level: Not on file   Occupational History     Occupation: Unemployed   Social Needs     Financial resource strain: Not on file     Food insecurity:     Worry: Not on file     Inability: Not on file     Transportation needs:     Medical: Not on file     Non-medical: Not on file   Tobacco Use     Smoking status: Former Smoker     Years: 15.00     Types: Cigarettes     Last attempt to quit: 2/12/2016     Years since quitting: 3.7     Smokeless tobacco: Never Used     Tobacco comment: 1 pack every 3 weeks   Substance and Sexual Activity "     Alcohol use: Yes     Alcohol/week: 1.0 standard drinks     Types: 1 Glasses of wine per week     Comment: rarely     Drug use: No     Sexual activity: Not on file   Lifestyle     Physical activity:     Days per week: Not on file     Minutes per session: Not on file     Stress: Not on file   Relationships     Social connections:     Talks on phone: Not on file     Gets together: Not on file     Attends [employer] service: Not on file     Active member of club or organization: Not on file     Attends meetings of clubs or organizations: Not on file     Relationship status: Not on file     Intimate partner violence:     Fear of current or ex partner: Not on file     Emotionally abused: Not on file     Physically abused: Not on file     Forced sexual activity: Not on file   Other Topics Concern     Not on file   Social History Narrative     Not on file       Family History   Problem Relation Age of Onset     Cirrhosis Sister      Cancer Father         ESOPHAGEAL     Cancer Maternal Grandmother         LUNG     Cancer Maternal Grandfather         BRAIN TUMOR     Anesthesia problems Neg Hx      Objective:      Physical Exam  Vitals:    11/26/19 1017   BP: 141/90   Pulse: 85   Temp: 97.7  F (36.5  C)     General - well developed, well nourished, appropriate for age. Appears moderately uncomfortable  Abdomen - mildly obese soft, non-tender, no hepatosplenomegaly, no masses.   - right flank mild tenderness and left flank  mild tenderness, no suprapubic tenderness, kidney and bladder non-palpable  MSK - normal spinal curvature. no spinal tenderness. normal gait. muscular strength intact.  Psych - oriented to time, place, and person, normal mood and affect.      Labs   Urinalysis POC (Office):  Blood UA   Date Value Ref Range Status   01/30/2017 Trace Negative Final   05/25/2016 Small Negative Final   04/14/2016 Moderate Negative Final     Nitrite, UA   Date Value Ref Range Status   11/26/2019 Positive (!) Negative Final    10/15/2019 Positive (!) Negative Final   01/30/2017 Negative Negative Final   05/25/2016 Negative Negative Final   04/14/2016 Negative Negative Final   07/27/2015 Negative Negative Final     Leukocytes, UA    Date Value Ref Range Status   01/30/2017 Negative Negative Final   05/25/2016 Trace (!) Negative Final   04/14/2016 Small (!) Negative Final     pH UA   Date Value Ref Range Status   01/30/2017 7.0 4.5 - 8.0 Final   05/25/2016 7.0 4.5 - 8.0 Final   04/14/2016 7.0 4.5 - 8.0 Final       Lab Urinalysis:  Blood, UA   Date Value Ref Range Status   11/26/2019 Large (!) Negative Final   10/15/2019 Large (!) Negative Final   07/27/2015 Large (!) Negative Final     Nitrite, UA   Date Value Ref Range Status   11/26/2019 Positive (!) Negative Final   10/15/2019 Positive (!) Negative Final   01/30/2017 Negative Negative Final   05/25/2016 Negative Negative Final   04/14/2016 Negative Negative Final   07/27/2015 Negative Negative Final     Leukocytes, UA   Date Value Ref Range Status   11/26/2019 Small (!) Negative Final   10/15/2019 Small (!) Negative Final   07/27/2015 Large (!) Negative Final     pH, UA   Date Value Ref Range Status   11/26/2019 7.0 5.0 - 8.0 Final   10/15/2019 7.0 5.0 - 8.0 Final   07/27/2015 6.5 4.5 - 8.0 Final

## 2021-07-20 NOTE — TELEPHONE ENCOUNTER
Message left for patient regarding a message she left on the [facility] voicemail to schedule an appointment.    Radha Bennett RN

## 2021-10-24 ENCOUNTER — HEALTH MAINTENANCE LETTER (OUTPATIENT)
Age: 56
End: 2021-10-24

## 2021-12-02 ENCOUNTER — APPOINTMENT (OUTPATIENT)
Dept: ULTRASOUND IMAGING | Facility: CLINIC | Age: 56
End: 2021-12-02
Attending: PHYSICIAN ASSISTANT
Payer: COMMERCIAL

## 2021-12-02 ENCOUNTER — APPOINTMENT (OUTPATIENT)
Dept: CT IMAGING | Facility: CLINIC | Age: 56
End: 2021-12-02
Attending: PHYSICIAN ASSISTANT
Payer: COMMERCIAL

## 2021-12-02 ENCOUNTER — HOSPITAL ENCOUNTER (EMERGENCY)
Facility: CLINIC | Age: 56
Discharge: HOME | End: 2021-12-02
Attending: PHYSICIAN ASSISTANT | Admitting: PHYSICIAN ASSISTANT
Payer: COMMERCIAL

## 2021-12-02 VITALS
OXYGEN SATURATION: 100 % | TEMPERATURE: 99.3 F | RESPIRATION RATE: 16 BRPM | SYSTOLIC BLOOD PRESSURE: 103 MMHG | DIASTOLIC BLOOD PRESSURE: 71 MMHG | HEART RATE: 106 BPM

## 2021-12-02 DIAGNOSIS — N12 PYELONEPHRITIS: ICD-10-CM

## 2021-12-02 DIAGNOSIS — N20.0 KIDNEY STONE: ICD-10-CM

## 2021-12-02 DIAGNOSIS — N18.9 CHRONIC RENAL INSUFFICIENCY: ICD-10-CM

## 2021-12-02 DIAGNOSIS — N28.89 LEFT RENAL MASS: ICD-10-CM

## 2021-12-02 LAB
ALBUMIN UR-MCNC: 100 MG/DL
ANION GAP SERPL CALCULATED.3IONS-SCNC: 6 MMOL/L (ref 3–14)
APPEARANCE UR: ABNORMAL
BACTERIA #/AREA URNS HPF: ABNORMAL /HPF
BILIRUB UR QL STRIP: NEGATIVE
BUN SERPL-MCNC: 22 MG/DL (ref 7–30)
CALCIUM SERPL-MCNC: 9.5 MG/DL (ref 8.5–10.1)
CHLORIDE BLD-SCNC: 102 MMOL/L (ref 94–109)
CO2 SERPL-SCNC: 25 MMOL/L (ref 20–32)
COLOR UR AUTO: YELLOW
CREAT SERPL-MCNC: 1.19 MG/DL (ref 0.52–1.04)
ERYTHROCYTE [DISTWIDTH] IN BLOOD BY AUTOMATED COUNT: 13.2 % (ref 10–15)
GFR SERPL CREATININE-BSD FRML MDRD: 51 ML/MIN/1.73M2
GLUCOSE BLD-MCNC: 102 MG/DL (ref 70–99)
GLUCOSE UR STRIP-MCNC: NEGATIVE MG/DL
HCT VFR BLD AUTO: 37.9 % (ref 35–47)
HGB BLD-MCNC: 11.9 G/DL (ref 11.7–15.7)
HGB UR QL STRIP: ABNORMAL
HYALINE CASTS: 5 /LPF
KETONES UR STRIP-MCNC: NEGATIVE MG/DL
LACTATE SERPL-SCNC: 0.7 MMOL/L (ref 0.7–2)
LEUKOCYTE ESTERASE UR QL STRIP: ABNORMAL
MCH RBC QN AUTO: 28.4 PG (ref 26.5–33)
MCHC RBC AUTO-ENTMCNC: 31.4 G/DL (ref 31.5–36.5)
MCV RBC AUTO: 91 FL (ref 78–100)
MUCOUS THREADS #/AREA URNS LPF: PRESENT /LPF
NITRATE UR QL: NEGATIVE
PH UR STRIP: 6 [PH] (ref 5–7)
PLATELET # BLD AUTO: 254 10E3/UL (ref 150–450)
POTASSIUM BLD-SCNC: 4.1 MMOL/L (ref 3.4–5.3)
RBC # BLD AUTO: 4.19 10E6/UL (ref 3.8–5.2)
RBC URINE: 119 /HPF
SARS-COV-2 RNA RESP QL NAA+PROBE: NEGATIVE
SODIUM SERPL-SCNC: 133 MMOL/L (ref 133–144)
SP GR UR STRIP: 1.01 (ref 1–1.03)
SQUAMOUS EPITHELIAL: 2 /HPF
TSH SERPL DL<=0.005 MIU/L-ACNC: 0.44 MU/L (ref 0.4–4)
UROBILINOGEN UR STRIP-MCNC: NORMAL MG/DL
WBC # BLD AUTO: 10.9 10E3/UL (ref 4–11)
WBC CLUMPS #/AREA URNS HPF: PRESENT /HPF
WBC URINE: >182 /HPF
YEAST #/AREA URNS HPF: ABNORMAL /HPF

## 2021-12-02 PROCEDURE — 258N000003 HC RX IP 258 OP 636: Performed by: PHYSICIAN ASSISTANT

## 2021-12-02 PROCEDURE — 36415 COLL VENOUS BLD VENIPUNCTURE: CPT | Performed by: EMERGENCY MEDICINE

## 2021-12-02 PROCEDURE — 76770 US EXAM ABDO BACK WALL COMP: CPT

## 2021-12-02 PROCEDURE — 96361 HYDRATE IV INFUSION ADD-ON: CPT

## 2021-12-02 PROCEDURE — 96366 THER/PROPH/DIAG IV INF ADDON: CPT

## 2021-12-02 PROCEDURE — C9803 HOPD COVID-19 SPEC COLLECT: HCPCS

## 2021-12-02 PROCEDURE — 250N000011 HC RX IP 250 OP 636: Performed by: PHYSICIAN ASSISTANT

## 2021-12-02 PROCEDURE — 96375 TX/PRO/DX INJ NEW DRUG ADDON: CPT

## 2021-12-02 PROCEDURE — 87635 SARS-COV-2 COVID-19 AMP PRB: CPT | Performed by: PHYSICIAN ASSISTANT

## 2021-12-02 PROCEDURE — 85027 COMPLETE CBC AUTOMATED: CPT | Performed by: PHYSICIAN ASSISTANT

## 2021-12-02 PROCEDURE — 36415 COLL VENOUS BLD VENIPUNCTURE: CPT | Performed by: PHYSICIAN ASSISTANT

## 2021-12-02 PROCEDURE — 74176 CT ABD & PELVIS W/O CONTRAST: CPT

## 2021-12-02 PROCEDURE — 81001 URINALYSIS AUTO W/SCOPE: CPT | Performed by: EMERGENCY MEDICINE

## 2021-12-02 PROCEDURE — 83605 ASSAY OF LACTIC ACID: CPT | Performed by: PHYSICIAN ASSISTANT

## 2021-12-02 PROCEDURE — 85014 HEMATOCRIT: CPT | Performed by: EMERGENCY MEDICINE

## 2021-12-02 PROCEDURE — 80048 BASIC METABOLIC PNL TOTAL CA: CPT | Performed by: PHYSICIAN ASSISTANT

## 2021-12-02 PROCEDURE — 99285 EMERGENCY DEPT VISIT HI MDM: CPT | Mod: 25

## 2021-12-02 PROCEDURE — 82310 ASSAY OF CALCIUM: CPT | Performed by: EMERGENCY MEDICINE

## 2021-12-02 PROCEDURE — 250N000013 HC RX MED GY IP 250 OP 250 PS 637: Performed by: PHYSICIAN ASSISTANT

## 2021-12-02 PROCEDURE — 81001 URINALYSIS AUTO W/SCOPE: CPT | Performed by: PHYSICIAN ASSISTANT

## 2021-12-02 PROCEDURE — 96365 THER/PROPH/DIAG IV INF INIT: CPT

## 2021-12-02 PROCEDURE — 84443 ASSAY THYROID STIM HORMONE: CPT | Performed by: EMERGENCY MEDICINE

## 2021-12-02 PROCEDURE — 87186 SC STD MICRODIL/AGAR DIL: CPT | Performed by: PHYSICIAN ASSISTANT

## 2021-12-02 RX ORDER — MORPHINE SULFATE 2 MG/ML
4 INJECTION, SOLUTION INTRAMUSCULAR; INTRAVENOUS ONCE
Status: COMPLETED | OUTPATIENT
Start: 2021-12-02 | End: 2021-12-02

## 2021-12-02 RX ORDER — CEFTRIAXONE 2 G/1
2 INJECTION, POWDER, FOR SOLUTION INTRAMUSCULAR; INTRAVENOUS ONCE
Status: COMPLETED | OUTPATIENT
Start: 2021-12-02 | End: 2021-12-02

## 2021-12-02 RX ORDER — MORPHINE SULFATE 2 MG/ML
2 INJECTION, SOLUTION INTRAMUSCULAR; INTRAVENOUS ONCE
Status: DISCONTINUED | OUTPATIENT
Start: 2021-12-02 | End: 2021-12-02

## 2021-12-02 RX ORDER — ACETAMINOPHEN 500 MG
1000 TABLET ORAL EVERY 4 HOURS PRN
Status: DISCONTINUED | OUTPATIENT
Start: 2021-12-02 | End: 2021-12-02 | Stop reason: HOSPADM

## 2021-12-02 RX ORDER — CEPHALEXIN 500 MG/1
500 CAPSULE ORAL 4 TIMES DAILY
Qty: 56 CAPSULE | Refills: 0 | Status: SHIPPED | OUTPATIENT
Start: 2021-12-02 | End: 2021-12-16

## 2021-12-02 RX ADMIN — CEFTRIAXONE 2 G: 2 INJECTION, POWDER, FOR SOLUTION INTRAMUSCULAR; INTRAVENOUS at 16:29

## 2021-12-02 RX ADMIN — ACETAMINOPHEN 1000 MG: 500 TABLET, FILM COATED ORAL at 16:20

## 2021-12-02 RX ADMIN — SODIUM CHLORIDE 1000 ML: 9 INJECTION, SOLUTION INTRAVENOUS at 15:57

## 2021-12-02 RX ADMIN — MORPHINE SULFATE 4 MG: 2 INJECTION, SOLUTION INTRAMUSCULAR; INTRAVENOUS at 18:28

## 2021-12-02 RX ADMIN — SODIUM CHLORIDE 1000 ML: 9 INJECTION, SOLUTION INTRAVENOUS at 18:24

## 2021-12-02 ASSESSMENT — ENCOUNTER SYMPTOMS
FATIGUE: 0
CHILLS: 0
FREQUENCY: 0
DIARRHEA: 0
CONFUSION: 0
SHORTNESS OF BREATH: 0
FLANK PAIN: 1
DYSURIA: 0
WEAKNESS: 0
FEVER: 0
HEMATURIA: 1
VOMITING: 0
NAUSEA: 0

## 2021-12-02 NOTE — ED PROVIDER NOTES
History   Chief Complaint:  Flank pain     HPI   Faby Yeung is a 56 year old female with history of nephrolithiasis and hypothyroidism who presents with flank pain. The patient reports two weeks ago noticed hematuria. She was unable to see her urology provider at that time. Yesterday she started to have left sided flank pain at 1830. Denies fever, chills, chest pain, shortness of breath, nausea, vomiting, diarrhea, confusion, weakness, fatigue, dysuria, urinary frequency or urgency. She has not missed a dose of her levothyroxine and her last dose was last night. Denies any recent use of antibiotics. Of note. she is being followed by Dr. De La Cruz from the urology service.    Review of Systems   Constitutional: Negative for chills, fatigue and fever.   Respiratory: Negative for shortness of breath.    Cardiovascular: Negative for chest pain.   Gastrointestinal: Negative for diarrhea, nausea and vomiting.   Genitourinary: Positive for flank pain and hematuria (two weeks ago). Negative for dysuria, frequency and urgency.   Neurological: Negative for weakness.   Psychiatric/Behavioral: Negative for confusion.   All other systems reviewed and are negative.    Allergies:  Penicillins  Ibuprofen Sodium  Levaquin  Sulfa Drugs  Levofloxacin     Medications:  Gabapentin  Synthroid  Ativan  Percocet  Protonix  Sertraline  Bicitra  Zanaflex     Past Medical History:    Anxiety  Depressive disorder  GERD  Leukocytopenia  Nephrolithiasis  Impaired renal function  PONV  Raynaud's syndrome  Scoliosis  Gastritis and gastroduodenitis  UTI  Chronic abdominal pain  Anemia  Cellulitis  Pulmonary embolism  Renal tubular acidosis  Hypothyroidism  Paroxysmal digital cyanosis   Pressure ulcer  Degenerative joint disease   Chronic pain syndrome     Past Surgical History:    Appendectomy  Ankle arthrodesis    Cystoscopy  Lithotripsy  Hernia repair  Hysterectomy with salpingo-oophorectomy  I and D   Wrist surgery  Yesenia  narciso placement    Social History:  The patient was accompanied to the ED by herself.  PCP: [place], [place]     Physical Exam     Patient Vitals for the past 24 hrs:   BP Temp Temp src Pulse Resp SpO2   12/02/21 1927 103/71 -- -- 106 16 100 %   12/02/21 1806 106/70 -- -- -- -- --   12/02/21 1710 104/74 -- -- 82 16 --   12/02/21 1624 97/63 -- -- 99 16 --   12/02/21 1529 95/55 99.3  F (37.4  C) Temporal 101 16 --   12/02/21 1335 -- 97  F (36.1  C) Temporal -- -- --   12/02/21 1334 109/79 -- -- 120 16 100 %       Physical Exam  Vitals signs and nursing note reviewed.   HENT:      Nose: Nose normal. No congestion or rhinorrhea.      Mouth/Throat:      Mouth: Mucous membranes are moist.      Pharynx: Oropharynx is clear. No oropharyngeal exudate or posterior oropharyngeal erythema.   Eyes:      General: No scleral icterus.     Extraocular Movements: Extraocular movements intact.      Conjunctiva/sclera: Conjunctivae normal.      Pupils: Pupils are equal, round, and reactive to light.   Cardiovascular:      Rate and Rhythm: Regular rhythm. Normal Rate.     Pulses: Normal pulses.      Heart sounds: Normal heart sounds.   Pulmonary:      Effort: Pulmonary effort is normal.      Breath sounds: Normal breath sounds.   Abdominal:      General: Abdomen is flat. Bowel sounds are normal.      Palpations: Abdomen is soft.      Tenderness: There is no abdominal tenderness.  Left CVA tenderness.    Musculoskeletal: Normal range of motion.   Skin:     General: Skin is warm and dry.   Neurological:      Mental Status: He is alert and oriented to person, place, and time.   Psychiatric:         Mood and Affect: Mood normal.         Behavior: Behavior normal.       Emergency Department Course     Imaging:  Abd/pelvis CT no contrast - Stone Protocol  1. Multiple bilateral kidney stones. No ureteral stones or  hydronephrosis in either kidney. Radiodense appearance of the renal  pyramids, which can be seen with medullary  nephrocalcinosis.  2. Fat stranding along the inferior pole of the left kidney of  indeterminate etiology, can be seen with urinary tract infection,  recommend correlation with urinalysis.  3. There is approximately 1.9 cm hypodense focus at the lower pole of  the left kidney with mildly elevated Hounsfield units, indeterminate,  could represent hemorrhagic cyst, can be further evaluated with renal  ultrasound to confirm cystic nature.  4. Cholelithiasis without CT evidence of acute cholecystitis.  5. Hepatic steatosis.    JAREN BEAULIEU MD   Report per radiology     US Renal Complete  1.  There is 2.4 cm mildly complex hypoechoic lesion at the lower pole of the left kidney, corresponds to the indeterminate lesion seen on same-day CT, indeterminant could represent complex renal cyst with layering debris versus solid renal mass.   Recommend further evaluation with contrast-enhanced MRI on nonemergent basis.  2.  Echogenic appearance of the renal pyramids, likely due to underlying medullary nephrocalcinosis. Bilateral kidney stones better seen on same-day CT. No hydronephrosis in either kidney.    Reading per radiology     Laboratory:  CBC: WBC 10.9, HGB 11.9,   BMP: creatinine 1.19 (H), glucose 102 (H), GFR estimate 51 (L) o/w WNL   Lactic Acid (Resulted: 1538): 0.7  TSH with free T4 reflex: 0.44  UA with micro: appearance slightly cloudy (!), blood large (!), protein albumin 100 (!), leukocyte esterase large (!), bacteria many (!), WBC clumps present (!), budding yeast few (!), mucus present (!),  (H), WBC >82 (H), squamous epithelialis 2 (H), hyaline casts 5 (H) o/w negative   Urine culture: pending   Asymptomatic COVID-19 Virus (Coronavirus) by PCR Nasopharyngeal swab: negative     Procedures  None    Emergency Department Course:  Reviewed:  I reviewed nursing notes, vitals, past medical history and Care Everywhere    Assessments:  1520 I obtained history and examined the patient as noted above.      1630 I rechecked the patient and explained findings.     1650 Patient rechecked.     1920 Patient rechecked and updated.      Consults:  1720 Spoke with Dr. Hawkins regarding the patient.    Interventions:  1557 0.9% NS 1000 mL IV  1620 Tylenol 1000 mg PO   1629 Rocephin 2 g IV  1824 0.9% NS 1000 mL IV  1828 morphine 4 mg IV    Disposition:  The patient was discharged to home.     Impression & Plan     CMS Diagnoses: None     Covid-19  Faby Yeung was evaluated during a global COVID-19 pandemic, which necessitated consideration that the patient might be at risk for infection with the SARS-CoV-2 virus that causes COVID-19.   Applicable protocols for evaluation were followed during the patient's care.   COVID-19 was considered as part of the patient's evaluation. The plan for testing is:  a test was obtained during this visit.    Medical Decision Making:  Faby Yeung is a 56 year old female with history of nephrolithiasis and hypothyroidism who presents to the emergency department for evaluation of left flank pain.  See HPI for additional details.  Vitals are reviewed.  Patient initially found to be tachycardic however she was afebrile and hemodynamically stable.  On physical exam, patient had left CVA tenderness.  Remaining abdominal exam unremarkable. Labs obtained and reviewed.  UA concerning for acute infection with evidence of hematuria.  BMP revealed a creatinine of 1.19 which per chart review is improved from prior evaluation.  Lactic 0.7.  TSH 0.44.  Covid negative.  CT of the abdomen and pelvis revealed multiple bilateral kidney stones.  There is no evidence of hydronephrosis   However there was fat stranding along the left kidney as well as a 1.9 cm hypodense area in the left kidney.  A renal ultrasound was performed to further evaluate this area with results concerning for solid renal mass vs. Complex renal cyst.  Results including incidental findings were discussed with the patient as  well as the recommendations for an outpatient contrast-enhanced MRI.  Patient verbalized understanding.  While in the emergency department, the patient was given IV fluids as well as antibiotics given concern for acute pyelonephritis.  On recheck, she noted significant improvement in her presenting symptoms and requested discharge home. She was ultimately discharged home in stable condition with a prescription for continued antibiotic therapy in the outpatient setting.  She additionally, we discussed the importance of close  follow-up with her primary care provider in 2 to 3 days for reassessment.  Strict return precautions were discussed.  All questions and concerns were addressed prior to discharge.        Diagnosis:    ICD-10-CM    1. Pyelonephritis  N12    2. Left renal mass  N28.89    3. Chronic renal insufficiency  N18.9    4. Kidney stone  N20.0        Discharge Medications:  Discharge Medication List as of 12/2/2021  7:33 PM      START taking these medications    Details   cephALEXin (KEFLEX) 500 MG capsule Take 1 capsule (500 mg) by mouth 4 times daily for 14 days, Disp-56 capsule, R-0, E-Prescribe             Scribe Disclosure:  I, Susy Crenshaw, am serving as a scribe at 3:13 PM on 12/2/2021 to document services personally performed by Xuan Valenzuela PA-C based on my observations and the provider's statements to me.                Xuan Valenzuela PA-C  12/02/21 1957

## 2021-12-02 NOTE — ED TRIAGE NOTES
ERP at bedside to revaluate and discuss xray results. Pt continues to c/o pain.    Pt c/o flank pain since yesterday. Hx kidney stones and states this feels similar. ABC intact.

## 2021-12-03 LAB — BACTERIA UR CULT: ABNORMAL

## 2021-12-03 NOTE — ED PROVIDER NOTES
Emergency Department Attending Supervision Note  12/2/2021  5:20 PM      I evaluated this patient in conjunction with Xuan Valenzuela PA-C      Briefly, the patient presented with left flank pain. She started to have hematuria two weeks ago. She also began to have left sided flank pain at around 1830. She denies any fever, chills, chest pain, shortness of breath, nausea, vomiting, diarrhea, weakness, confusion, fatigue, dysuria, urinary frequency or urgency. She has a history of multiple kidney stones requiring surgical intervention. She notes mild left sided pelvic pain. She reports she has not been on antibiotics recently.     On my exam, patient is well appearing, nontoxic.  CV:  RRR, no M/R/G  Resp:  CTAB, no increased WORK OF BREATHING  Abd:  Soft mild LLQ/suprapubic tenderness to palpation + left flank tenderness to palpation.     Results:  All ed results reviewed by myself.   Results for orders placed or performed during the hospital encounter of 12/02/21   Abd/pelvis CT no contrast - Stone Protocol     Status: None    Narrative    CT ABDOMEN AND PELVIS WITHOUT CONTRAST   12/2/2021 4:00 PM     HISTORY: Flank pain, kidney stone suspected.    TECHNIQUE: Noncontrast CT abdomen and pelvis was performed. Radiation  dose for this scan was reduced using automated exposure control,  adjustment of the mA and/or kV according to patient size, or iterative  reconstruction technique.    COMPARISON: CT abdomen and pelvis on 2/25/2021.    FINDINGS:  Lower chest: Bibasilar pulmonary opacities, likely represent  atelectasis. Small size hiatal hernia.    Right kidney: Radiodense appearance of the renal pyramids, likely due  to underlying medullary nephrocalcinosis. Multiple kidney stones  including 6 mm at interpolar region (series 4 image 50). No ureteral  stone or hydronephrosis.    Left kidney: Radiodense appearance of the renal pyramids, likely due  to medullary nephrocalcinosis. Multiple kidney stones including 6  mm  stone at the mid/upper pole (series 4 image 44). 1.9 cm hypodense  appearance at the lower pole of the kidney (series 4 image 72), with  mildly elevated Hounsfield unit of 17, indeterminate, could represent  hemorrhagic cyst.    Urinary bladder: Partially distended and unremarkable.    Remainder of the abdomen and pelvis: Limited evaluation of the  abdominal organs due to lack of IV contrast.    Hepatobiliary: Cholecystitis without CT evidence of acute  cholecystitis. Diffuse hyperattenuation of the liver, likely due to  underlying hepatic steatosis.    Pancreas: The unenhanced pancreas is grossly unremarkable.    Spleen: No splenomegaly.    Adrenal glands: No adrenal nodules.    Bowels: No abnormally dilated bowel loops.    Peritoneum: No significant free fluid in the abdomen or pelvis. No  free peritoneal or portal venous gas. Mild fat stranding along the  inferior pole of the left kidney of indeterminate etiology.    Pelvic organs: The uterus is not visualized, likely surgically absent.    Vascular: Scattered atherosclerotic vascular calcification of the  abdominal aorta and iliac vessels.    Lymph nodes: No significant abdominopelvic lymphadenopathy.    Bones and soft tissue: Rotoscoliosis of the lumbar spine with  associated multilevel degenerative changes of the spine.       Impression    IMPRESSION:   1. Multiple bilateral kidney stones. No ureteral stones or  hydronephrosis in either kidney. Radiodense appearance of the renal  pyramids, which can be seen with medullary nephrocalcinosis.  2. Fat stranding along the inferior pole of the left kidney of  indeterminate etiology, can be seen with urinary tract infection,  recommend correlation with urinalysis.  3. There is approximately 1.9 cm hypodense focus at the lower pole of  the left kidney with mildly elevated Hounsfield units, indeterminate,  could represent hemorrhagic cyst, can be further evaluated with renal  ultrasound to confirm cystic nature.  4.  Cholelithiasis without CT evidence of acute cholecystitis.  5. Hepatic steatosis.    JAREN BEAULIEU MD         SYSTEM ID:  [id number]   US Renal Complete     Status: None    Narrative    EXAM: US RENAL COMPLETE  LOCATION: [facility]  DATE/TIME: 12/2/2021 5:36 PM    INDICATION: 1.9 cm hypodense focus at the lower pole of left kidney noted on CT.  COMPARISON: CT abdomen pelvis on same day earlier  TECHNIQUE: Routine Bilateral Renal and Bladder Ultrasound.    FINDINGS:    RIGHT KIDNEY: Measures 10.3 x 4.8 x 4.6 cm. Echogenic appearance of the renal pyramids, likely due to underlying medullary nephrocalcinosis. Few echogenic shadowing foci in the kidney, likely represent kidney stones. No hydronephrosis.    LEFT KIDNEY: Measures 12.6 x 5.2 x 5.2 cm. Echogenic appearance of the renal pyramids, likely due to underlying medullary nephrocalcinosis. No hydronephrosis. There is approximately 1.9 x 2.1 x 2.4 cm mildly complex hypoechoic area at the lower pole of   the kidney, corresponds to the indeterminate lesion seen on same-day CT.     BLADDER: Partially distended with bilateral ureteral jets.      Impression    IMPRESSION:  1.  There is 2.4 cm mildly complex hypoechoic lesion at the lower pole of the left kidney, corresponds to the indeterminate lesion seen on same-day CT, could represent complex renal cyst with layering debris versus less likely cystic renal neoplasm.   Recommend further evaluation with contrast-enhanced MRI on nonemergent basis.  2.  Echogenic appearance of the renal pyramids, likely due to underlying medullary nephrocalcinosis. Bilateral kidney stones better seen on same-day CT. No hydronephrosis in either kidney.   CBC (platelets, no diff)     Status: Abnormal   Result Value Ref Range    WBC Count 10.9 4.0 - 11.0 10e3/uL    RBC Count 4.19 3.80 - 5.20 10e6/uL    Hemoglobin 11.9 11.7 - 15.7 g/dL    Hematocrit 37.9 35.0 - 47.0 %    MCV 91 78 - 100 fL    MCH 28.4 26.5 - 33.0 pg     MCHC 31.4 (L) 31.5 - 36.5 g/dL    RDW 13.2 10.0 - 15.0 %    Platelet Count 254 150 - 450 10e3/uL   Basic metabolic panel (BMP)     Status: Abnormal   Result Value Ref Range    Sodium 133 133 - 144 mmol/L    Potassium 4.1 3.4 - 5.3 mmol/L    Chloride 102 94 - 109 mmol/L    Carbon Dioxide (CO2) 25 20 - 32 mmol/L    Anion Gap 6 3 - 14 mmol/L    Urea Nitrogen 22 7 - 30 mg/dL    Creatinine 1.19 (H) 0.52 - 1.04 mg/dL    Calcium 9.5 8.5 - 10.1 mg/dL    Glucose 102 (H) 70 - 99 mg/dL    GFR Estimate 51 (L) >60 mL/min/1.73m2   UA with Microscopic     Status: Abnormal   Result Value Ref Range    Color Urine Yellow Colorless, Straw, Light Yellow, Yellow    Appearance Urine Slightly Cloudy (A) Clear    Glucose Urine Negative Negative mg/dL    Bilirubin Urine Negative Negative    Ketones Urine Negative Negative mg/dL    Specific Gravity Urine 1.013 1.003 - 1.035    Blood Urine Large (A) Negative    pH Urine 6.0 5.0 - 7.0    Protein Albumin Urine 100  (A) Negative mg/dL    Urobilinogen Urine Normal Normal, 2.0 mg/dL    Nitrite Urine Negative Negative    Leukocyte Esterase Urine Large (A) Negative    Bacteria Urine Many (A) None Seen /HPF    WBC Clumps Urine Present (A) None Seen /HPF    Budding Yeast Urine Few (A) None Seen /HPF    Mucus Urine Present (A) None Seen /LPF    RBC Urine 119 (H) <=2 /HPF    WBC Urine >182 (H) <=5 /HPF    Squamous Epithelials Urine 2 (H) <=1 /HPF    Hyaline Casts Urine 5 (H) <=2 /LPF   Lactic acid whole blood     Status: Normal   Result Value Ref Range    Lactic Acid 0.7 0.7 - 2.0 mmol/L   TSH with free T4 reflex     Status: Normal   Result Value Ref Range    TSH 0.44 0.40 - 4.00 mU/L   Asymptomatic COVID-19 Virus (Coronavirus) by PCR Nasopharyngeal     Status: Normal    Specimen: Nasopharyngeal; Swab   Result Value Ref Range    SARS CoV2 PCR Negative Negative    Narrative    Testing was performed using the shira  SARS-CoV-2 & Influenza A/B Assay on the shira  Karmen  System.  This test should be  ordered for the detection of SARS-COV-2 in individuals who meet SARS-CoV-2 clinical and/or epidemiological criteria. Test performance is unknown in asymptomatic patients.  This test is for in vitro diagnostic use under the FDA EUA for laboratories certified under CLIA to perform moderate and/or high complexity testing. This test has not been FDA cleared or approved.  A negative test does not rule out the presence of PCR inhibitors in the specimen or target RNA in concentration below the limit of detection for the assay. The possibility of a false negative should be considered if the patient's recent exposure or clinical presentation suggests COVID-19.  [facility] are certified under the Clinical Laboratory Improvement Amendments of 1988 (CLIA-88) as qualified to perform moderate and/or high complexity laboratory testing.        ED course:  1804 I checked the patient and obtained history.    Pyelonephritis. No evidence of sepsis or severe systemic illness. CT without any acute surgical findings. IV ceftriaxone given in the ED. Prior urine cultures with pansensitive E Coli. Safe for DC with close outpatient follow up with PCP and Urology.    My impression is 1. Acute pyelonephritis 2. Nephrolithiasis 3. Abnormal left renal cyst        Diagnosis    ICD-10-CM    1. Pyelonephritis  N12    2. Left renal mass  N28.89    3. Chronic renal insufficiency  N18.9    4. Kidney stone  N20.0        Scribe Disclosure:  Modesta MCKENZIE Hired, am serving as a scribe at 5:23 PM on 12/2/2021 to document services personally performed by Arsen Hawkins MD based on my observations and the provider's statements to me.                 Arsen Hawkins MD  12/03/21 7337

## 2021-12-03 NOTE — ED NOTES
[facility]  ED Nurse Handoff Report    Faby Yeung is a 56 year old female   ED Chief complaint: No chief complaint on file.  . ED Diagnosis:   Final diagnoses:   None     Allergies:   Allergies   Allergen Reactions     Penicillins Anaphylaxis     Has tolerated ceftriaxone     Ibuprofen Sodium      Levaquin      Sulfa Drugs        Code Status: Full Code  Activity level - Baseline/Home:  Independent. Activity Level - Current:   Independent. Lift room needed: No. Bariatric: No   Needed: No   Isolation: No. Infection: Not Applicable.     Vital Signs:   Vitals:    12/02/21 1529 12/02/21 1624 12/02/21 1710 12/02/21 1806   BP: 95/55 97/63 104/74 106/70   Pulse: 101 99 82    Resp: 16 16 16    Temp: 99.3  F (37.4  C)      TempSrc: Temporal      SpO2:           Cardiac Rhythm:  ,      Pain level:    Patient confused: No. Patient Falls Risk: No.   Elimination Status: Has voided   Patient Report - Initial Complaint: Left sided pain. Focused Assessment: A&O x4, ABCs intact. Pt reports having left sided flank pain. She denies nausea or vomiting. Pt ambulates independently.   Tests Performed: lab, imaging. Abnormal Results:   Labs Ordered and Resulted from Time of ED Arrival to Time of ED Departure   CBC WITH PLATELETS - Abnormal       Result Value    WBC Count 10.9      RBC Count 4.19      Hemoglobin 11.9      Hematocrit 37.9      MCV 91      MCH 28.4      MCHC 31.4 (*)     RDW 13.2      Platelet Count 254     BASIC METABOLIC PANEL - Abnormal    Sodium 133      Potassium 4.1      Chloride 102      Carbon Dioxide (CO2) 25      Anion Gap 6      Urea Nitrogen 22      Creatinine 1.19 (*)     Calcium 9.5      Glucose 102 (*)     GFR Estimate 51 (*)    ROUTINE UA WITH MICROSCOPIC - Abnormal    Color Urine Yellow      Appearance Urine Slightly Cloudy (*)     Glucose Urine Negative      Bilirubin Urine Negative      Ketones Urine Negative      Specific Gravity Urine 1.013      Blood Urine Large (*)      pH Urine 6.0      Protein Albumin Urine 100  (*)     Urobilinogen Urine Normal      Nitrite Urine Negative      Leukocyte Esterase Urine Large (*)     Bacteria Urine Many (*)     WBC Clumps Urine Present (*)     Budding Yeast Urine Few (*)     Mucus Urine Present (*)     RBC Urine 119 (*)     WBC Urine >182 (*)     Squamous Epithelials Urine 2 (*)     Hyaline Casts Urine 5 (*)    LACTIC ACID WHOLE BLOOD - Normal    Lactic Acid 0.7     TSH WITH FREE T4 REFLEX - Normal    TSH 0.44     URINE CULTURE     Abd/pelvis CT no contrast - Stone Protocol   Final Result   IMPRESSION:    1. Multiple bilateral kidney stones. No ureteral stones or   hydronephrosis in either kidney. Radiodense appearance of the renal   pyramids, which can be seen with medullary nephrocalcinosis.   2. Fat stranding along the inferior pole of the left kidney of   indeterminate etiology, can be seen with urinary tract infection,   recommend correlation with urinalysis.   3. There is approximately 1.9 cm hypodense focus at the lower pole of   the left kidney with mildly elevated Hounsfield units, indeterminate,   could represent hemorrhagic cyst, can be further evaluated with renal   ultrasound to confirm cystic nature.   4. Cholelithiasis without CT evidence of acute cholecystitis.   5. Hepatic steatosis.      JAREN BEAULIEU MD            SYSTEM ID:  [id number]      US Renal Complete    (Results Pending)     .   Treatments provided: See MAR  Family Comments: None  OBS brochure/video discussed/provided to patient:  N/A  ED Medications:   Medications   morphine (PF) injection 4 mg (has no administration in time range)   acetaminophen (TYLENOL) tablet 1,000 mg (1,000 mg Oral Given 12/2/21 1620)   0.9% sodium chloride BOLUS (1,000 mLs Intravenous New Bag 12/2/21 1824)   cefTRIAXone (ROCEPHIN) 2 g vial to attach to  ml bag for ADULTS or NS 50 ml bag for PEDS (0 g Intravenous Stopped 12/2/21 1823)   0.9% sodium chloride BOLUS (0 mLs Intravenous  Stopped 12/2/21 9373)     Drips infusing:  No  For the majority of the shift, the patient's behavior Green. Interventions performed were None.    Sepsis treatment initiated: No     Patient tested for COVID 19 prior to admission: YES    ED Nurse Name/Phone Number: Jesús Akhtar RN,   6:27 PM

## 2021-12-03 NOTE — DISCHARGE INSTRUCTIONS
Your symptoms today are concerning for pyelonephritis.  It is important that you take the antibiotics as prescribed.  Please follow-up with your primary care provider in 1 to 2 days for reassessment.  As discussed, there was evidence of a mass in your left kidney.  Recommendation includes MRI in the outpatient setting.      Please return to the emergency department if you develop fever, increased discomfort, unable to tolerate oral intake or antibiotics, or any other medical concerns.

## 2022-01-13 ENCOUNTER — HOSPITAL ENCOUNTER (OUTPATIENT)
Dept: MRI IMAGING | Facility: CLINIC | Age: 57
Discharge: HOME | End: 2022-01-13
Attending: FAMILY MEDICINE | Admitting: FAMILY MEDICINE
Payer: COMMERCIAL

## 2022-01-13 DIAGNOSIS — N28.89 RENAL MASS: ICD-10-CM

## 2022-01-13 PROCEDURE — 255N000002 HC RX 255 OP 636: Performed by: STUDENT IN AN ORGANIZED HEALTH CARE EDUCATION/TRAINING PROGRAM

## 2022-01-13 PROCEDURE — 74183 MRI ABD W/O CNTR FLWD CNTR: CPT

## 2022-01-13 PROCEDURE — A9585 GADOBUTROL INJECTION: HCPCS | Performed by: STUDENT IN AN ORGANIZED HEALTH CARE EDUCATION/TRAINING PROGRAM

## 2022-01-13 PROCEDURE — 74183 MRI ABD W/O CNTR FLWD CNTR: CPT | Mod: 26 | Performed by: RADIOLOGY

## 2022-01-13 RX ORDER — GADOBUTROL 604.72 MG/ML
10 INJECTION INTRAVENOUS ONCE
Status: COMPLETED | OUTPATIENT
Start: 2022-01-13 | End: 2022-01-13

## 2022-01-13 RX ADMIN — GADOBUTROL 7 ML: 604.72 INJECTION INTRAVENOUS at 08:06

## 2022-04-10 ENCOUNTER — HEALTH MAINTENANCE LETTER (OUTPATIENT)
Age: 57
End: 2022-04-10

## 2022-10-15 ENCOUNTER — HEALTH MAINTENANCE LETTER (OUTPATIENT)
Age: 57
End: 2022-10-15

## 2023-03-23 ENCOUNTER — APPOINTMENT (OUTPATIENT)
Dept: GENERAL RADIOLOGY | Facility: CLINIC | Age: 58
End: 2023-03-23
Attending: EMERGENCY MEDICINE
Payer: COMMERCIAL

## 2023-03-23 ENCOUNTER — APPOINTMENT (OUTPATIENT)
Dept: CT IMAGING | Facility: CLINIC | Age: 58
End: 2023-03-23
Attending: EMERGENCY MEDICINE
Payer: COMMERCIAL

## 2023-03-23 ENCOUNTER — HOSPITAL ENCOUNTER (OUTPATIENT)
Facility: CLINIC | Age: 58
Setting detail: OBSERVATION
Discharge: HOME | End: 2023-03-25
Attending: EMERGENCY MEDICINE | Admitting: STUDENT IN AN ORGANIZED HEALTH CARE EDUCATION/TRAINING PROGRAM
Payer: COMMERCIAL

## 2023-03-23 DIAGNOSIS — Z98.890 HX OF NEPHROLITHOTOMY WITH REMOVAL OF CALCULI: ICD-10-CM

## 2023-03-23 DIAGNOSIS — R06.09 DYSPNEA ON EXERTION: ICD-10-CM

## 2023-03-23 DIAGNOSIS — R79.89 ELEVATED TROPONIN: ICD-10-CM

## 2023-03-23 DIAGNOSIS — Z87.442 HX OF NEPHROLITHOTOMY WITH REMOVAL OF CALCULI: ICD-10-CM

## 2023-03-23 DIAGNOSIS — I10 HYPERTENSION, UNSPECIFIED TYPE: ICD-10-CM

## 2023-03-23 DIAGNOSIS — N39.0 URINARY TRACT INFECTION WITHOUT HEMATURIA, SITE UNSPECIFIED: ICD-10-CM

## 2023-03-23 DIAGNOSIS — F41.9 ANXIETY DISORDER, UNSPECIFIED TYPE: Primary | ICD-10-CM

## 2023-03-23 DIAGNOSIS — K21.9 GASTROESOPHAGEAL REFLUX DISEASE WITHOUT ESOPHAGITIS: ICD-10-CM

## 2023-03-23 DIAGNOSIS — E03.9 HYPOTHYROIDISM, UNSPECIFIED TYPE: ICD-10-CM

## 2023-03-23 LAB
ALBUMIN UR-MCNC: 70 MG/DL
ANION GAP SERPL CALCULATED.3IONS-SCNC: 12 MMOL/L (ref 7–15)
APPEARANCE UR: ABNORMAL
BACTERIA #/AREA URNS HPF: ABNORMAL /HPF
BASOPHILS # BLD AUTO: 0 10E3/UL (ref 0–0.2)
BASOPHILS NFR BLD AUTO: 1 %
BILIRUB UR QL STRIP: NEGATIVE
BUN SERPL-MCNC: 24.1 MG/DL (ref 6–20)
CALCIUM SERPL-MCNC: 10.4 MG/DL (ref 8.6–10)
CHLORIDE SERPL-SCNC: 98 MMOL/L (ref 98–107)
COLOR UR AUTO: YELLOW
CREAT SERPL-MCNC: 1.61 MG/DL (ref 0.51–0.95)
DEPRECATED HCO3 PLAS-SCNC: 29 MMOL/L (ref 22–29)
EOSINOPHIL # BLD AUTO: 0.3 10E3/UL (ref 0–0.7)
EOSINOPHIL NFR BLD AUTO: 6 %
ERYTHROCYTE [DISTWIDTH] IN BLOOD BY AUTOMATED COUNT: 14.4 % (ref 10–15)
GFR SERPL CREATININE-BSD FRML MDRD: 37 ML/MIN/1.73M2
GLUCOSE SERPL-MCNC: 80 MG/DL (ref 70–99)
GLUCOSE UR STRIP-MCNC: NEGATIVE MG/DL
HCT VFR BLD AUTO: 43 % (ref 35–47)
HGB BLD-MCNC: 14 G/DL (ref 11.7–15.7)
HGB UR QL STRIP: ABNORMAL
HOLD SPECIMEN: NORMAL
HOLD SPECIMEN: NORMAL
IMM GRANULOCYTES # BLD: 0 10E3/UL
IMM GRANULOCYTES NFR BLD: 0 %
KETONES UR STRIP-MCNC: NEGATIVE MG/DL
LEUKOCYTE ESTERASE UR QL STRIP: ABNORMAL
LYMPHOCYTES # BLD AUTO: 1.4 10E3/UL (ref 0.8–5.3)
LYMPHOCYTES NFR BLD AUTO: 31 %
MCH RBC QN AUTO: 30.8 PG (ref 26.5–33)
MCHC RBC AUTO-ENTMCNC: 32.6 G/DL (ref 31.5–36.5)
MCV RBC AUTO: 95 FL (ref 78–100)
MONOCYTES # BLD AUTO: 0.3 10E3/UL (ref 0–1.3)
MONOCYTES NFR BLD AUTO: 7 %
MUCOUS THREADS #/AREA URNS LPF: PRESENT /LPF
NEUTROPHILS # BLD AUTO: 2.6 10E3/UL (ref 1.6–8.3)
NEUTROPHILS NFR BLD AUTO: 55 %
NITRATE UR QL: POSITIVE
NRBC # BLD AUTO: 0 10E3/UL
NRBC BLD AUTO-RTO: 0 /100
PH UR STRIP: 6.5 [PH] (ref 5–7)
PLATELET # BLD AUTO: 253 10E3/UL (ref 150–450)
POTASSIUM SERPL-SCNC: 4.1 MMOL/L (ref 3.4–5.3)
RBC # BLD AUTO: 4.55 10E6/UL (ref 3.8–5.2)
RBC URINE: >182 /HPF
SODIUM SERPL-SCNC: 139 MMOL/L (ref 136–145)
SP GR UR STRIP: 1.01 (ref 1–1.03)
SQUAMOUS EPITHELIAL: 1 /HPF
T4 FREE SERPL-MCNC: <0.1 NG/DL (ref 0.9–1.7)
TROPONIN T SERPL HS-MCNC: 50 NG/L
TROPONIN T SERPL HS-MCNC: 55 NG/L
TSH SERPL DL<=0.005 MIU/L-ACNC: 274.7 UIU/ML (ref 0.3–4.2)
UROBILINOGEN UR STRIP-MCNC: NORMAL MG/DL
WBC # BLD AUTO: 4.6 10E3/UL (ref 4–11)
WBC CLUMPS #/AREA URNS HPF: PRESENT /HPF
WBC URINE: 78 /HPF

## 2023-03-23 PROCEDURE — 87086 URINE CULTURE/COLONY COUNT: CPT | Performed by: EMERGENCY MEDICINE

## 2023-03-23 PROCEDURE — 250N000011 HC RX IP 250 OP 636: Performed by: EMERGENCY MEDICINE

## 2023-03-23 PROCEDURE — 84443 ASSAY THYROID STIM HORMONE: CPT | Performed by: EMERGENCY MEDICINE

## 2023-03-23 PROCEDURE — 81001 URINALYSIS AUTO W/SCOPE: CPT | Performed by: EMERGENCY MEDICINE

## 2023-03-23 PROCEDURE — 99285 EMERGENCY DEPT VISIT HI MDM: CPT | Mod: 25

## 2023-03-23 PROCEDURE — 93005 ELECTROCARDIOGRAM TRACING: CPT

## 2023-03-23 PROCEDURE — 84484 ASSAY OF TROPONIN QUANT: CPT | Performed by: EMERGENCY MEDICINE

## 2023-03-23 PROCEDURE — 84439 ASSAY OF FREE THYROXINE: CPT | Performed by: EMERGENCY MEDICINE

## 2023-03-23 PROCEDURE — 71046 X-RAY EXAM CHEST 2 VIEWS: CPT

## 2023-03-23 PROCEDURE — 99222 1ST HOSP IP/OBS MODERATE 55: CPT | Performed by: STUDENT IN AN ORGANIZED HEALTH CARE EDUCATION/TRAINING PROGRAM

## 2023-03-23 PROCEDURE — 96365 THER/PROPH/DIAG IV INF INIT: CPT

## 2023-03-23 PROCEDURE — 85025 COMPLETE CBC W/AUTO DIFF WBC: CPT | Performed by: EMERGENCY MEDICINE

## 2023-03-23 PROCEDURE — 80048 BASIC METABOLIC PNL TOTAL CA: CPT | Performed by: EMERGENCY MEDICINE

## 2023-03-23 PROCEDURE — 36415 COLL VENOUS BLD VENIPUNCTURE: CPT | Performed by: EMERGENCY MEDICINE

## 2023-03-23 PROCEDURE — 70450 CT HEAD/BRAIN W/O DYE: CPT

## 2023-03-23 RX ORDER — CEFTRIAXONE 1 G/1
1 INJECTION, POWDER, FOR SOLUTION INTRAMUSCULAR; INTRAVENOUS ONCE
Status: COMPLETED | OUTPATIENT
Start: 2023-03-23 | End: 2023-03-23

## 2023-03-23 RX ORDER — LEVOTHYROXINE SODIUM 175 UG/1
175 TABLET ORAL ONCE
Status: COMPLETED | OUTPATIENT
Start: 2023-03-23 | End: 2023-03-24

## 2023-03-23 RX ADMIN — CEFTRIAXONE 1 G: 1 INJECTION, POWDER, FOR SOLUTION INTRAMUSCULAR; INTRAVENOUS at 21:44

## 2023-03-23 ASSESSMENT — ACTIVITIES OF DAILY LIVING (ADL)
ADLS_ACUITY_SCORE: 37

## 2023-03-23 ASSESSMENT — ENCOUNTER SYMPTOMS
ABDOMINAL PAIN: 0
NUMBNESS: 0
WEAKNESS: 0
FATIGUE: 1
FACIAL SWELLING: 1
SHORTNESS OF BREATH: 1
HEADACHES: 1
HEMATURIA: 1
APPETITE CHANGE: 0

## 2023-03-23 NOTE — ED TRIAGE NOTES
"Shortness of breath with exertion over the last few days  Denies hx of COPD & smoking  Notes she has not had her synthroid in over a month  Pt reports mild difficultly swallowing that was noticed this morning at 0600. Last known normal swallowing was at 0000 today. No vision changes. Denies extremity weakness. BEFAST otherwise negative in triage.   Generalized weakness & fatigue over the last several days.   Pt states \"I have a history of kidney stones and I think I have a kidney infection. My urine is dark.\"  Daily headache x1 month, denies htn hx     Triage Assessment     Row Name 03/23/23 1811       Triage Assessment (Adult)    Airway WDL WDL       Respiratory WDL    Respiratory WDL X;rhythm/pattern    Rhythm/Pattern, Respiratory shortness of breath       Cognitive/Neuro/Behavioral WDL    Cognitive/Neuro/Behavioral WDL WDL              "

## 2023-03-23 NOTE — ED NOTES
Rapid Assessment Note    History:   Faby Yeung is a 57 year old female who presents with multiple complaints over the past month, particularly worsening headache and increasing fatigue with exertion. Notes accompanying shortness of breath with her fatigue. Reports headaches with exertion and when looking down over the past month. States she feels more swollen from the neck up and reports a feeling of swelling to her face/throat and a new raspy voice. Also reports some brief visual disturbance recently but states this is resolved. Patient does have some hematuria too. Also reports increased swelling to her hands. Of note, she did have a fall on Monday at which time she hit her head, but no loss of consciousness from this. Patient denies chest pain or leg swelling. No new numbness or tingling to arms. No weakness. Denies abdominal pain or appetite change. No drug or daily alcohol use. Patient is a former smoker.    Exam:   General:  Alert, interactive, mild global facial swelling   Cardiovascular:  Well perfused, slightly pale fingertips, history of Raynaud's syndrome  Lungs:  No respiratory distress, no accessory muscle use  Neuro:  Moving all 4 extremities  Skin:  Warm, dry, lower extremity venous stasis changes  Psych:  Normal affect  Neuro: CN's II-XII without obvious abnormality.  Gait is normal w/o ataxia.  Neg Romberg.  5/5 UE and LE strength which is symmetrical.   Reflexes are 2+ and symmetrical. Negative Pronator drift.    Finger to Nose testing is intact bilaterally.    Light touch is symmetrical bilateral UE's and LE's.  PERRLA, EOMI.    No meningismus and full neck AROM/PROM.     Plan of Care:   I evaluated the patient and developed an initial plan of care. I discussed this plan and explained that I, or one of my partners, would be returning to complete the evaluation.     I, Meghna Gallegos, am serving as a scribe to document services personally performed by Will Villalta MD, based on my  observations and the provider's statements to me.    3/23/2023  [employer]    Portions of this medical record were completed by a scribe. UPON MY REVIEW AND AUTHENTICATION BY ELECTRONIC SIGNATURE, this confirms (a) I performed the applicable clinical services, and (b) the record is accurate.      Will Villalta MD  03/23/23 1905

## 2023-03-24 ENCOUNTER — APPOINTMENT (OUTPATIENT)
Dept: CARDIOLOGY | Facility: CLINIC | Age: 58
End: 2023-03-24
Attending: STUDENT IN AN ORGANIZED HEALTH CARE EDUCATION/TRAINING PROGRAM
Payer: COMMERCIAL

## 2023-03-24 LAB
ANION GAP SERPL CALCULATED.3IONS-SCNC: 9 MMOL/L (ref 7–15)
ATRIAL RATE - MUSE: 71 BPM
BUN SERPL-MCNC: 22.7 MG/DL (ref 6–20)
CA-I BLD-MCNC: 5 MG/DL (ref 4.4–5.2)
CALCIUM SERPL-MCNC: 10.6 MG/DL (ref 8.6–10)
CHLORIDE SERPL-SCNC: 97 MMOL/L (ref 98–107)
CREAT SERPL-MCNC: 1.5 MG/DL (ref 0.51–0.95)
DEPRECATED HCO3 PLAS-SCNC: 30 MMOL/L (ref 22–29)
DIASTOLIC BLOOD PRESSURE - MUSE: NORMAL MMHG
GFR SERPL CREATININE-BSD FRML MDRD: 40 ML/MIN/1.73M2
GLUCOSE SERPL-MCNC: 87 MG/DL (ref 70–99)
INTERPRETATION ECG - MUSE: NORMAL
LVEF ECHO: NORMAL
MAGNESIUM SERPL-MCNC: 2 MG/DL (ref 1.7–2.3)
P AXIS - MUSE: 50 DEGREES
PHOSPHATE SERPL-MCNC: 3.8 MG/DL (ref 2.5–4.5)
POTASSIUM SERPL-SCNC: 4.6 MMOL/L (ref 3.4–5.3)
PR INTERVAL - MUSE: 170 MS
QRS DURATION - MUSE: 86 MS
QT - MUSE: 390 MS
QTC - MUSE: 423 MS
R AXIS - MUSE: 52 DEGREES
SODIUM SERPL-SCNC: 136 MMOL/L (ref 136–145)
SYSTOLIC BLOOD PRESSURE - MUSE: NORMAL MMHG
T AXIS - MUSE: 53 DEGREES
T4 FREE SERPL-MCNC: 0.18 NG/DL (ref 0.9–1.7)
TROPONIN T SERPL HS-MCNC: 47 NG/L
TSH SERPL DL<=0.005 MIU/L-ACNC: 303.7 UIU/ML (ref 0.3–4.2)
VENTRICULAR RATE- MUSE: 71 BPM

## 2023-03-24 PROCEDURE — G0378 HOSPITAL OBSERVATION PER HR: HCPCS

## 2023-03-24 PROCEDURE — 250N000013 HC RX MED GY IP 250 OP 250 PS 637: Performed by: INTERNAL MEDICINE

## 2023-03-24 PROCEDURE — 96366 THER/PROPH/DIAG IV INF ADDON: CPT

## 2023-03-24 PROCEDURE — 250N000013 HC RX MED GY IP 250 OP 250 PS 637: Performed by: STUDENT IN AN ORGANIZED HEALTH CARE EDUCATION/TRAINING PROGRAM

## 2023-03-24 PROCEDURE — 83735 ASSAY OF MAGNESIUM: CPT | Performed by: INTERNAL MEDICINE

## 2023-03-24 PROCEDURE — 84439 ASSAY OF FREE THYROXINE: CPT | Performed by: STUDENT IN AN ORGANIZED HEALTH CARE EDUCATION/TRAINING PROGRAM

## 2023-03-24 PROCEDURE — 99232 SBSQ HOSP IP/OBS MODERATE 35: CPT | Performed by: INTERNAL MEDICINE

## 2023-03-24 PROCEDURE — 36415 COLL VENOUS BLD VENIPUNCTURE: CPT | Performed by: INTERNAL MEDICINE

## 2023-03-24 PROCEDURE — 96376 TX/PRO/DX INJ SAME DRUG ADON: CPT

## 2023-03-24 PROCEDURE — 36415 COLL VENOUS BLD VENIPUNCTURE: CPT | Performed by: STUDENT IN AN ORGANIZED HEALTH CARE EDUCATION/TRAINING PROGRAM

## 2023-03-24 PROCEDURE — 255N000002 HC RX 255 OP 636: Performed by: EMERGENCY MEDICINE

## 2023-03-24 PROCEDURE — 84100 ASSAY OF PHOSPHORUS: CPT | Performed by: INTERNAL MEDICINE

## 2023-03-24 PROCEDURE — 82330 ASSAY OF CALCIUM: CPT | Performed by: INTERNAL MEDICINE

## 2023-03-24 PROCEDURE — 84484 ASSAY OF TROPONIN QUANT: CPT | Performed by: STUDENT IN AN ORGANIZED HEALTH CARE EDUCATION/TRAINING PROGRAM

## 2023-03-24 PROCEDURE — 250N000013 HC RX MED GY IP 250 OP 250 PS 637: Performed by: EMERGENCY MEDICINE

## 2023-03-24 PROCEDURE — 93306 TTE W/DOPPLER COMPLETE: CPT | Mod: 26 | Performed by: INTERNAL MEDICINE

## 2023-03-24 PROCEDURE — 250N000011 HC RX IP 250 OP 636: Performed by: STUDENT IN AN ORGANIZED HEALTH CARE EDUCATION/TRAINING PROGRAM

## 2023-03-24 PROCEDURE — 84443 ASSAY THYROID STIM HORMONE: CPT | Performed by: STUDENT IN AN ORGANIZED HEALTH CARE EDUCATION/TRAINING PROGRAM

## 2023-03-24 PROCEDURE — 80048 BASIC METABOLIC PNL TOTAL CA: CPT | Performed by: STUDENT IN AN ORGANIZED HEALTH CARE EDUCATION/TRAINING PROGRAM

## 2023-03-24 PROCEDURE — 250N000013 HC RX MED GY IP 250 OP 250 PS 637

## 2023-03-24 PROCEDURE — 999N000208 ECHOCARDIOGRAM COMPLETE

## 2023-03-24 PROCEDURE — 999N000147 HC STATISTIC PT IP EVAL DEFER

## 2023-03-24 RX ORDER — ONDANSETRON 2 MG/ML
4 INJECTION INTRAMUSCULAR; INTRAVENOUS EVERY 6 HOURS PRN
Status: DISCONTINUED | OUTPATIENT
Start: 2023-03-24 | End: 2023-03-25 | Stop reason: HOSPADM

## 2023-03-24 RX ORDER — AMOXICILLIN 250 MG
1 CAPSULE ORAL 2 TIMES DAILY PRN
Status: DISCONTINUED | OUTPATIENT
Start: 2023-03-24 | End: 2023-03-25 | Stop reason: HOSPADM

## 2023-03-24 RX ORDER — PROCHLORPERAZINE 25 MG
25 SUPPOSITORY, RECTAL RECTAL EVERY 12 HOURS PRN
Status: DISCONTINUED | OUTPATIENT
Start: 2023-03-24 | End: 2023-03-25 | Stop reason: HOSPADM

## 2023-03-24 RX ORDER — ACETAMINOPHEN 325 MG/1
650 TABLET ORAL EVERY 6 HOURS PRN
Status: DISCONTINUED | OUTPATIENT
Start: 2023-03-24 | End: 2023-03-25 | Stop reason: HOSPADM

## 2023-03-24 RX ORDER — LEVOTHYROXINE SODIUM 100 UG/1
100 TABLET ORAL ONCE
Status: COMPLETED | OUTPATIENT
Start: 2023-03-24 | End: 2023-03-24

## 2023-03-24 RX ORDER — CEFTRIAXONE 2 G/1
2 INJECTION, POWDER, FOR SOLUTION INTRAMUSCULAR; INTRAVENOUS EVERY 24 HOURS
Status: DISCONTINUED | OUTPATIENT
Start: 2023-03-24 | End: 2023-03-25 | Stop reason: HOSPADM

## 2023-03-24 RX ORDER — LEVOTHYROXINE SODIUM 100 UG/1
100 TABLET ORAL DAILY
Status: DISCONTINUED | OUTPATIENT
Start: 2023-03-24 | End: 2023-03-24

## 2023-03-24 RX ORDER — PANTOPRAZOLE SODIUM 40 MG/1
40 TABLET, DELAYED RELEASE ORAL DAILY
Status: DISCONTINUED | OUTPATIENT
Start: 2023-03-24 | End: 2023-03-25 | Stop reason: HOSPADM

## 2023-03-24 RX ORDER — AMOXICILLIN 250 MG
2 CAPSULE ORAL 2 TIMES DAILY PRN
Status: DISCONTINUED | OUTPATIENT
Start: 2023-03-24 | End: 2023-03-25 | Stop reason: HOSPADM

## 2023-03-24 RX ORDER — ACETAMINOPHEN 650 MG/1
650 SUPPOSITORY RECTAL EVERY 6 HOURS PRN
Status: DISCONTINUED | OUTPATIENT
Start: 2023-03-24 | End: 2023-03-25 | Stop reason: HOSPADM

## 2023-03-24 RX ORDER — CALCIUM CARBONATE 500 MG/1
1000 TABLET, CHEWABLE ORAL 3 TIMES DAILY PRN
Status: DISCONTINUED | OUTPATIENT
Start: 2023-03-24 | End: 2023-03-25 | Stop reason: HOSPADM

## 2023-03-24 RX ORDER — ONDANSETRON 4 MG/1
4 TABLET, ORALLY DISINTEGRATING ORAL EVERY 6 HOURS PRN
Status: DISCONTINUED | OUTPATIENT
Start: 2023-03-24 | End: 2023-03-25 | Stop reason: HOSPADM

## 2023-03-24 RX ORDER — PROCHLORPERAZINE MALEATE 5 MG
10 TABLET ORAL EVERY 6 HOURS PRN
Status: DISCONTINUED | OUTPATIENT
Start: 2023-03-24 | End: 2023-03-25 | Stop reason: HOSPADM

## 2023-03-24 RX ORDER — MAGNESIUM OXIDE 400 MG/1
400 TABLET ORAL EVERY 4 HOURS
Status: COMPLETED | OUTPATIENT
Start: 2023-03-24 | End: 2023-03-24

## 2023-03-24 RX ORDER — CEFTRIAXONE 1 G/1
1 INJECTION, POWDER, FOR SOLUTION INTRAMUSCULAR; INTRAVENOUS ONCE
Status: COMPLETED | OUTPATIENT
Start: 2023-03-24 | End: 2023-03-24

## 2023-03-24 RX ORDER — LEVOTHYROXINE SODIUM 100 UG/1
200 TABLET ORAL DAILY
Status: DISCONTINUED | OUTPATIENT
Start: 2023-03-25 | End: 2023-03-25 | Stop reason: HOSPADM

## 2023-03-24 RX ADMIN — LEVOTHYROXINE SODIUM 175 MCG: 0.17 TABLET ORAL at 00:34

## 2023-03-24 RX ADMIN — CEFTRIAXONE 1 G: 1 INJECTION, POWDER, FOR SOLUTION INTRAMUSCULAR; INTRAVENOUS at 00:34

## 2023-03-24 RX ADMIN — LEVOTHYROXINE SODIUM 100 MCG: 0.1 TABLET ORAL at 11:36

## 2023-03-24 RX ADMIN — LEVOTHYROXINE SODIUM 100 MCG: 0.1 TABLET ORAL at 09:12

## 2023-03-24 RX ADMIN — CALCIUM CARBONATE (ANTACID) CHEW TAB 500 MG 1000 MG: 500 CHEW TAB at 03:04

## 2023-03-24 RX ADMIN — CEFTRIAXONE 2 G: 2 INJECTION, POWDER, FOR SOLUTION INTRAMUSCULAR; INTRAVENOUS at 20:30

## 2023-03-24 RX ADMIN — ACETAMINOPHEN 650 MG: 325 TABLET ORAL at 09:16

## 2023-03-24 RX ADMIN — ACETAMINOPHEN, ASPIRIN, CAFFEINE 1 TABLET: 250; 65; 250 TABLET, FILM COATED ORAL at 21:51

## 2023-03-24 RX ADMIN — CALCIUM CARBONATE (ANTACID) CHEW TAB 500 MG 1000 MG: 500 CHEW TAB at 21:54

## 2023-03-24 RX ADMIN — PANTOPRAZOLE SODIUM 40 MG: 40 TABLET, DELAYED RELEASE ORAL at 10:05

## 2023-03-24 RX ADMIN — MAGNESIUM OXIDE TAB 400 MG (241.3 MG ELEMENTAL MG) 400 MG: 400 (241.3 MG) TAB at 16:34

## 2023-03-24 RX ADMIN — MAGNESIUM OXIDE TAB 400 MG (241.3 MG ELEMENTAL MG) 400 MG: 400 (241.3 MG) TAB at 11:54

## 2023-03-24 RX ADMIN — ACETAMINOPHEN 650 MG: 325 TABLET ORAL at 16:34

## 2023-03-24 RX ADMIN — HUMAN ALBUMIN MICROSPHERES AND PERFLUTREN 7 ML: 10; .22 INJECTION, SOLUTION INTRAVENOUS at 08:15

## 2023-03-24 ASSESSMENT — ACTIVITIES OF DAILY LIVING (ADL)
ADLS_ACUITY_SCORE: 37
ADLS_ACUITY_SCORE: 22
ADLS_ACUITY_SCORE: 37
ADLS_ACUITY_SCORE: 22
ADLS_ACUITY_SCORE: 37
ADLS_ACUITY_SCORE: 37
ADLS_ACUITY_SCORE: 22
ADLS_ACUITY_SCORE: 37

## 2023-03-24 ASSESSMENT — PATIENT HEALTH QUESTIONNAIRE - PHQ9: SUM OF ALL RESPONSES TO PHQ9 QUESTIONS 1 & 2: 3

## 2023-03-24 NOTE — PLAN OF CARE
Physical therapy:    PT orders received and acknowledged. Per chart and conversation with RN, pt has been up IND, appears to be at baseline for mobility. Entered pt room to discuss, PT evaluation and goals of care. Pt reports no concerns about mobility for safe discharge home, declines need for IP PT evaluation. Reports chronic ankle pain and low back pain related to prior surgical/medical history. Discussion on proper body mechanics during household tasks related to low back pain and benefits of follow-up in OP setting for these. Pt understanding of these recommendations. Will complete PT orders and defer to care team for further discharge recommendations.    Rossana Wiggins DPT

## 2023-03-24 NOTE — PROGRESS NOTES
ROOM # 208-1    Living Situation (if not independent, order SW consult): lives alone  Facility name: NA  : indep    Activity level at baseline: SBA  Activity level on admit: SBA      Patient registered to observation; given Patient Bill of Rights; given the opportunity to ask questions about observation status and their plan of care.  Patient has been oriented to the observation room, bathroom and call light is in place.    Discussed discharge goals and expectations with patient/family.

## 2023-03-24 NOTE — ED NOTES
[facility]  ED Nurse Handoff Report    Faby Yeung is a 57 year old female   ED Chief complaint: Shortness of Breath  . ED Diagnosis:   Final diagnoses:   Hypertension, unspecified type   Hypothyroidism, unspecified type   Elevated troponin   Dyspnea on exertion     Allergies:   Allergies   Allergen Reactions     Penicillins Anaphylaxis     Has tolerated ceftriaxone     Ibuprofen Sodium      Levaquin      Sulfa Drugs        Code Status: Full Code  Activity level - Baseline/Home:  Independent. Activity Level - Current:   Stand by Assist. Lift room needed: No. Bariatric: No   Needed: No   Isolation: No. Infection: Not Applicable.     Vital Signs:   Vitals:    03/23/23 2113 03/23/23 2114 03/23/23 2115 03/23/23 2116   BP:       Pulse:       Resp:       Temp:       TempSrc:       SpO2: 99% 97% 97% 99%       Cardiac Rhythm:  ,      Pain level:    Patient confused: No. Patient Falls Risk: Yes.   Elimination Status: Has voided   Patient Report - Initial Complaint: SOB. Focused Assessment:    ROS:  Review of Systems   Constitutional: Positive for fatigue. Negative for appetite change.   HENT: Positive for facial swelling.    Eyes: Positive for visual disturbance (resolved).   Respiratory: Positive for shortness of breath.    Cardiovascular: Negative for chest pain and leg swelling.   Gastrointestinal: Negative for abdominal pain.   Genitourinary: Positive for hematuria.   Neurological: Positive for headaches. Negative for weakness and numbness.        (-) loss of consciousness   All other systems reviewed and are negative.      Tests Performed: Labs, imaging. Abnormal Results:   Labs Ordered and Resulted from Time of ED Arrival to Time of ED Departure   BASIC METABOLIC PANEL - Abnormal       Result Value    Sodium 139      Potassium 4.1      Chloride 98      Carbon Dioxide (CO2) 29      Anion Gap 12      Urea Nitrogen 24.1 (*)     Creatinine 1.61 (*)     Calcium 10.4 (*)     Glucose 80       GFR Estimate 37 (*)    TROPONIN T, HIGH SENSITIVITY - Abnormal    Troponin T, High Sensitivity 55 (*)    TSH WITH FREE T4 REFLEX - Abnormal    .70 (*)    ROUTINE UA WITH MICROSCOPIC REFLEX TO CULTURE - Abnormal    Color Urine Yellow      Appearance Urine Slightly Cloudy (*)     Glucose Urine Negative      Bilirubin Urine Negative      Ketones Urine Negative      Specific Gravity Urine 1.014      Blood Urine Large (*)     pH Urine 6.5      Protein Albumin Urine 70 (*)     Urobilinogen Urine Normal      Nitrite Urine Positive (*)     Leukocyte Esterase Urine Large (*)     Bacteria Urine Many (*)     WBC Clumps Urine Present (*)     Mucus Urine Present (*)     RBC Urine >182 (*)     WBC Urine 78 (*)     Squamous Epithelials Urine 1     T4 FREE - Abnormal    Free T4 <0.10 (*)    TROPONIN T, HIGH SENSITIVITY - Abnormal    Troponin T, High Sensitivity 50 (*)    CBC WITH PLATELETS AND DIFFERENTIAL    WBC Count 4.6      RBC Count 4.55      Hemoglobin 14.0      Hematocrit 43.0      MCV 95      MCH 30.8      MCHC 32.6      RDW 14.4      Platelet Count 253      % Neutrophils 55      % Lymphocytes 31      % Monocytes 7      % Eosinophils 6      % Basophils 1      % Immature Granulocytes 0      NRBCs per 100 WBC 0      Absolute Neutrophils 2.6      Absolute Lymphocytes 1.4      Absolute Monocytes 0.3      Absolute Eosinophils 0.3      Absolute Basophils 0.0      Absolute Immature Granulocytes 0.0      Absolute NRBCs 0.0     URINE CULTURE     XR Chest 2 Views   Final Result   IMPRESSION: Heart size and pulmonary vascularity normal. Minimal scarring or atelectasis left base, lungs otherwise clear. Convex right thoracic scoliosis.      CT Head w/o Contrast   Final Result   IMPRESSION:   1.  Normal head CT.        .   Treatments provided: See MAR. Abx and levothyroxine administered. Frequent monitoring of pt.  Family Comments: N/a  OBS brochure/video discussed/provided to patient:  Yes  ED Medications:   Medications    levothyroxine (SYNTHROID/LEVOTHROID) tablet 175 mcg (has no administration in time range)   cefTRIAXone (ROCEPHIN) 1 g vial to attach to  mL bag for ADULTS or NS 50 mL bag for PEDS (0 g Intravenous Stopped 3/23/23 2234)     Drips infusing:  No  For the majority of the shift, the patient's behavior Green. Interventions performed were N/a.    Sepsis treatment initiated: No     Patient tested for COVID 19 prior to admission: NO    ED Nurse Name/Phone Number: Jeanette Chan RN,   11:44 PM    RECEIVING UNIT ED HANDOFF REVIEW    Above ED Nurse Handoff Report was reviewed: Yes  Reviewed by: Stephanie Laguerre RN on March 24, 2023 at 2:32 PM

## 2023-03-24 NOTE — PROGRESS NOTES
[facility]    Medicine Progress Note - Hospitalist Service    Date of Admission:  3/23/2023    Assessment & Plan      Ms. Faby Yeung is a 57 year old female with history of hypothyroidism, medullary sponge kidneys, recurrent UTI admitted on 3/23/2023 dyspnea on exertion and worsening fatigue in the setting of missing her medications for about a month due to being out of town and financial constraints.      Patient reportedly developed dyspnea on exertion and fatigue a couple weeks ago.  She has been off of her PTA medication regimen for about a month now as she was out of town and also reports some financial constraints related to her medication regimen.  She now has [facility] and does not foresee any reason for medication lapse in the upcoming future.  She denies any chest pain, pressure or palpitations.  No fever or chills.     Dyspnea on exertion   Fatigue and facial swelling   Troponin elevation, suspect stress reaction   Patient reportedly developed dyspnea on exertion and fatigue a couple weeks ago.  She has been off of her PTA medication regimen for about a month now as she was out of town and also reports some financial constraints related to her medication regimen.  She now has [facility] and does not foresee any reason for medication lapse in the upcoming future.  She denies any chest pain, pressure or palpitations.    Patient is not hypoxic, CXR with Heart size and pulmonary vascularity normal. Minimal scarring or atelectasis left base, lungs otherwise clear. Convex right thoracic scoliosis.   Differential at this time remains broad, suspect that her nodes are likely related to not being on her thyroid medication for about a month.  Although cannot rule out something like a new onset CHF or other primary pulmonary process.  Plan for further work-up as below.  - TTE  showed nml EF with grade 1 DD.  - cardiac monitoring overnight   - PT evaluation    - she has pulse oximetry with  "oxygen support as needed, not requiring any at the time     Hypothyroidism   Has been off of levothyroxine for about a month. TSH elevatred to 274, T4 < 0.1 in arrival.   - received PO levothyroxine in ED   - PTA levothyroxine restarted at 100 mcg   - no evidence of myxedema coma at this time        UTI   History of Medullary spongue kidneys   History of recurrent UTIs   - ceftriaxone x 5 days, will transition to PO once more data available   - urine culture pending , awaiting culture and sensitivities      JULIEN   - BMP in AM   - encourage PO intake        Diet: Regular Diet Adult    DVT Prophylaxis: Pneumatic Compression Devices  Duarte Catheter: Not present  Lines: None     Cardiac Monitoring: ACTIVE order. Indication: trop elevation  Code Status: Full Code      Clinically Significant Risk Factors Present on Admission           # Hypercalcemia: Highest Ca = 10.6 mg/dL in last 2 days, will monitor as appropriate             # Overweight: Estimated body mass index is 28.32 kg/m  as calculated from the following:    Height as of this encounter: 1.676 m (5' 6\").    Weight as of this encounter: 79.6 kg (175 lb 7.8 oz).           Disposition Plan      Expected Discharge Date: 03/25/2023                  June Frizt MD  Hospitalist Service  [facility]  Securely message with "[employer]" (more info)  Text page via [employer] Paging/Directory   ______________________________________________________________________    Interval History     No chest pain/SOB. Has been feeling fatigued for a week.    Physical Exam   Vital Signs: Temp: 97.7  F (36.5  C) Temp src: Oral BP: (!) 137/108 Pulse: 76   Resp: 14 SpO2: 96 % O2 Device: None (Room air)    Weight: 175 lbs 7.78 oz    Constitutional: awake, alert, cooperative, no apparent distress, and appears stated age, appears older than stated age   HEENT: Normocephalic, atraumatic  Respiratory: Normal work of breathing, good air exchange, clear to auscultation " bilaterally, no crackles or wheezing    Cardiovascular: Regular rate and rhythm, no murmurs appreciated  GI: Soft and nontender to palpation, nondistended, no rebound or guarding  Skin: xerosis throughout bilateral LE, otherwise normal skin color, texture, turgor  Musculoskeletal: No deformities, no edema present  Neurologic: Alert and oriented, no focal deficits noted   Neuropsychiatric: General: normal, calm and normal eye contact        Medical Decision Making     30 MINUTES SPENT BY ME on the date of service doing chart review, history, exam, documentation & further activities per the note.      Data     I have personally reviewed the following data over the past 24 hrs:    4.6  \   14.0   / 253     136 97 (L) 22.7 (H) /  87   4.6 30 (H) 1.50 (H) \       Trop: 47 (H) BNP: N/A       TSH: 274.70 (H) T4: <0.10 (L) A1C: N/A       Imaging results reviewed over the past 24 hrs:   Recent Results (from the past 24 hour(s))   CT Head w/o Contrast    Narrative    EXAM: CT HEAD W/O CONTRAST  LOCATION: [facility]  DATE/TIME: 3/23/2023 7:53 PM    INDICATION: daily headaches  COMPARISON: 01/24/2018  TECHNIQUE: Routine CT Head without IV contrast. Multiplanar reformats. Dose reduction techniques were used.    FINDINGS:  INTRACRANIAL CONTENTS: No intracranial hemorrhage, extraaxial collection, or mass effect.  No CT evidence of acute infarct. Normal parenchymal attenuation. Normal ventricles and sulci.     VISUALIZED ORBITS/SINUSES/MASTOIDS: No intraorbital abnormality. No paranasal sinus mucosal disease. No middle ear or mastoid effusion.    BONES/SOFT TISSUES: No acute abnormality.      Impression    IMPRESSION:  1.  Normal head CT.   XR Chest 2 Views    Narrative    EXAM: XR CHEST 2 VIEWS  LOCATION: [facility]  DATE/TIME: 3/23/2023 8:02 PM    INDICATION: SOB, fatigue  COMPARISON: 06/15/2013      Impression    IMPRESSION: Heart size and pulmonary vascularity normal. Minimal  scarring or atelectasis left base, lungs otherwise clear. Convex right thoracic scoliosis.   Echocardiogram Complete   Result Value    LVEF  55-60%    [facility]    [id number]  [id number]  [id number]  [id number]^JUAN JOSE^CONCEPCION     [facility]  Echocardiography Laboratory  [address] [place] [address]     Name: LIZZ HOLGUIN  MRN: [id number]  : 1965  Study Date: 2023 07:46 AM  Age: 57 yrs  Gender: Female  Patient Location: [facility]  Reason For Study: Dyspnea  Ordering Physician: CONCEPCION INGRAM  Performed By: Jose Sullivan RDCS     BSA: 1.9 m2  Height: 66 in  Weight: 175 lb  BP: 148/110 mmHg  ______________________________________________________________________________  Procedure  Complete Portable Echo Adult. Optison (NDC #[id number]) given intravenously.  ______________________________________________________________________________  Interpretation Summary     The visual ejection fraction is 55-60%.  Left ventricular systolic function is normal.  There is trace aortic regurgitation.  The study was technically difficult.  ______________________________________________________________________________  Left Ventricle  The left ventricle is normal in size. There is mild concentric left  ventricular hypertrophy. Grade I or early diastolic dysfunction. The visual  ejection fraction is 55-60%. Left ventricular systolic function is normal.  Diastolic Doppler findings (E/E' ratio and/or other parameters) suggest left  ventricular filling pressures are normal. The left ventricular apex is not  well visualized.     Right Ventricle  The right ventricle is normal size. The right ventricular systolic function is  normal.     Atria  Normal left atrial size. Right atrial size is normal. There is no color  Doppler evidence of an atrial shunt.     Mitral Valve  There is mild (1+) mitral regurgitation.     Tricuspid Valve  There is trace tricuspid regurgitation. Right ventricular systolic  pressure  could not be approximated due to inadequate tricuspid regurgitation.     Aortic Valve  There is trace aortic regurgitation. No hemodynamically significant valvular  aortic stenosis.     Pulmonic Valve  There is no pulmonic valvular regurgitation.     Vessels  The aortic root is normal size. The ascending aorta is Borderline dilated.  Inferior vena cava not well visualized for estimation of right atrial  pressure.     Pericardium  There is no pericardial effusion.     Rhythm  Sinus rhythm was noted.  ______________________________________________________________________________  MMode/2D Measurements & Calculations  IVSd: 1.3 cm  LVIDd: 3.3 cm  LVIDs: 2.6 cm  LVPWd: 1.1 cm  FS: 21.5 %  LV mass(C)d: 128.6 grams  LV mass(C)dI: 68.0 grams/m2  Ao root diam: 3.4 cm  LA dimension: 2.8 cm  asc Aorta Diam: 3.5 cm  LA/Ao: 0.82  LA Volume (BP): 25.0 ml  LA Volume Index (BP): 13.2 ml/m2     RWT: 0.69     Doppler Measurements & Calculations  MV E max angel: 40.0 cm/sec  MV A max angel: 67.1 cm/sec  MV E/A: 0.60  MV dec time: 0.30 sec  Ao V2 max: 74.0 cm/sec  Ao max P.0 mmHg  E/E' av.1  Lateral E/e': 5.3  Medial E/e': 8.9     ______________________________________________________________________________  Report approved by: Demarco Lee 2023 10:05 AM

## 2023-03-24 NOTE — PLAN OF CARE
"PRIMARY DIAGNOSIS: facial swelling and and fatigue  OUTPATIENT/OBSERVATION GOALS TO BE MET BEFORE DISCHARGE:  1. ADLs back to baseline: YES    2. Activity and level of assistance: Indep    3. Pain status: reports headache 7/10    4. Return to near baseline physical activity: YES     Discharge Planner Nurse   Safe discharge environment identified: Lives alone  Barriers to discharge: Not once medically cleared       Entered by: Jerrica Spangler RN 03/24/2023 5:57 PM    BP (!) 144/93 (BP Location: Right arm)   Pulse 74   Temp 98.2  F (36.8  C) (Oral)   Resp 16   Ht 1.676 m (5' 6\")   Wt 80.5 kg (177 lb 6.4 oz)   SpO2 93%   BMI 28.63 kg/m      Vitals stable. Pt alert and oriented x4. Very sleepy still. Independently moving. Reports headache, tylenol & ice pack in place. Denies chest pain/dizziness/lightheadedness. Pt tolerating regular diet. Continues to have swollen neck, pt reports improving. Pt resting comfortably. Will continue to provide supportive cares.       Please review provider order for any additional goals.   Nurse to notify provider when observation goals have been met and patient is ready for discharge.  "

## 2023-03-24 NOTE — PROGRESS NOTES
Pt A&Ox4. Up independently in room. Steady on her feet. On regular diet and tolerating it well. Denies pain. She had heartburn in the middle of the night and requested tums. This was ordered and given. Slept on and off throughout the night.     BP (!) 148/110   Pulse 76   Temp 97.7  F (36.5  C) (Oral)   Resp 12   SpO2 96%

## 2023-03-24 NOTE — H&P
[facility]  History and Physical - Hospitalist Service  Date of Admission:  3/23/2023    Assessment & Plan     Ms. Faby Yeung is a 57 year old female with history of hypothyroidism, medullary sponge kidneys, recurrent UTI admitted on 3/23/2023 dyspnea on exertion and worsening fatigue in the setting of missing her medications for about a month due to being out of town and financial constraints.  Pending clinical improvement and ongoing management is outlined below    Dyspnea on exertion   Fatigue and facial swelling   Troponin elevation, suspect stress reaction   Patient reportedly developed dyspnea on exertion and fatigue a couple weeks ago.  She has been off of her PTA medication regimen for about a month now as she was out of town and also reports some financial constraints related to her medication regimen.  She now has [facility] and does not foresee any reason for medication lapse in the upcoming future.  She denies any chest pain, pressure or palpitations.    Patient is not hypoxic, CXR with Heart size and pulmonary vascularity normal. Minimal scarring or atelectasis left base, lungs otherwise clear. Convex right thoracic scoliosis.   Differential at this time remains broad, suspect that her nodes are likely related to not being on her thyroid medication for about a month.  Although cannot rule out something like a new onset CHF or other primary pulmonary process.  Plan for further work-up as below.  - TTE pending   - repeat troponin in AM  - cardiac monitoring overnight   - PT evaluation    - she has pulse oximetry with oxygen support as needed, not requiring any at the time    Hypothyroidism   Has been off of levothyroxine for about a month. TSH elevatred to 274, T4 < 0.1 in arrival.   - received IV levothyroxine in ED   - PTA levothyroxine restarted on decreased dose of 100 mcg   - no evidence of myxedema coma at this time   - repeat TSH/T4 in AM     UTI   History of Medullary  spongue kidneys   History of recurrent UTIs   - ceftriaxone x 5 days, will transition to PO once more data available   - urine culture pending , awaiting culture and sensitivities     JULIEN   - BMP in AM   - encourage PO intake     Hypercalcemia   Highest Ca = 10.4 mg/dL in last 2 days       Diet: Regular Diet Adult  DVT Prophylaxis: Pneumatic Compression Devices  Duarte Catheter: Not present  Lines: None     Cardiac Monitoring: ACTIVE order. Indication: trop elevation  Code Status: Full Code    Clinically Significant Risk Factors Present on Admission                            Disposition Plan      Expected Discharge Date: 03/24/2023                  Sera Hawkins DO  Hospitalist Service  [facility]  Securely message with [employer] (more info)  Text page via [facility] Paging/Directory     ______________________________________________________________________    Chief Complaint   Dyspnea on exertion , medication lapse     History is obtained from the patient, electronic health record and emergency department physician    History of Present Illness   Ms. Faby Yeung is a 57 year old female with history of hypothyroidism, medullary sponge kidneys, recurrent UTI admitted on 3/23/2023 dyspnea on exertion and worsening fatigue in the setting of missing her medications for about a month due to being out of town and financial constraints.     Patient reportedly developed dyspnea on exertion and fatigue a couple weeks ago.  She has been off of her PTA medication regimen for about a month now as she was out of town and also reports some financial constraints related to her medication regimen.  She now has [facility] and does not foresee any reason for medication lapse in the upcoming future.  She denies any chest pain, pressure or palpitations.  No fever or chills.     She reports no other new concerns or complaints today.     Past Medical History    Past Medical History:   Diagnosis Date     Anxiety       Depressive disorder      Gastro-oesophageal reflux disease      Hx of blood clots     5-6 months ago  (winter) on coumadin     Leukocytopenia, unspecified      Nephrolithiasis      Other specified disorders resulting from impaired renal function      Pain in joint, ankle and foot      PONV (postoperative nausea and vomiting)      Raynaud's syndrome      Scoliosis      Unspecified gastritis and gastroduodenitis without mention of hemorrhage      Unspecified hypothyroidism      Urinary tract infection        Past Surgical History   Past Surgical History:   Procedure Laterality Date     ANKLE SURGERY Left x3    including debridement, hardware removal     ANKLE SURGERY Left 2016     APPENDECTOMY       APPENDECTOMY       ARTHRODESIS ANKLE  3/18/2013    Procedure: ARTHRODESIS ANKLE;  LEFT TALONAVICULAR FUSION (C-ARM);  Surgeon: Cheryl Arroyo MD;  Location: [facility]      SECTION        SECTION       COMBINED CYSTOSCOPY, INSERT STENT URETER(S) Left 3/8/2016    Procedure: CYSTOSCOPY LEFT STENT INSERTION;  Surgeon: Rubén De La Cruz MD;  Location: [facility] OR;  Service:      CYSTOSCOPY       GENITOURINARY SURGERY      lithotripsy     HERNIA REPAIR       HYSTERECTOMY       HYSTERECTOMY TOTAL ABDOMINAL, BILATERAL SALPINGO-OOPHORECTOMY, COMBINED       IR NEPHROLITHOTOMY  2015     IR NEPHROLITHOTOMY  2016     IR NEPHROSTOMY TUBE PLACEMENT LEFT  3/22/2016     IRRIGATION AND DEBRIDEMENT FOOT, COMBINED  2013    Procedure: COMBINED IRRIGATION AND DEBRIDEMENT FOOT;  LEFT FOOT IRRIGATION AND DEBRIDEMENT, HARDWARE REMOVAL;  Surgeon: Cheryl Arroyo MD;  Location: [facility]     LITHOTRIPSY       NEPHROLITHOTOMY Right      ORTHOPEDIC SURGERY       wrist,  ankle, sherie narciso placement     OTHER SURGICAL HISTORY Left     SURGICAL MANIPULATION OF ANKLE JOINThardware put in     OTHER SURGICAL HISTORY  X2    SCOLIOSIS SURGERYhardware removal     GA ANESTH,REPAIR UPPER ABD HERNIA NOS        RELEASE CARPAL TUNNEL Bilateral      SMALL INTESTINE SURGERY       SPINE SURGERY       URETEROSCOPY         Prior to Admission Medications   Prior to Admission Medications   Prescriptions Last Dose Informant Patient Reported? Taking?   LORazepam (ATIVAN) 1 MG tablet   Yes No   Sig: Take 1 mg by mouth every 6 hours as needed for anxiety DO NOT TAKE WITHIN 6 HOURS OF OXYCODONE   gabapentin (NEURONTIN) 300 MG capsule   Yes No   Sig: Take 600 mg by mouth 3 times daily   levothyroxine (SYNTHROID/LEVOTHROID) 175 MCG tablet   No No   Sig: Take 1 tablet (175 mcg) by mouth daily   oxyCODONE-acetaminophen (PERCOCET) 5-325 MG tablet   Yes No   Sig: Take 1 tablet by mouth 3 times daily as needed for severe pain   pantoprazole (PROTONIX) 40 MG EC tablet   Yes No   Sig: Take 40 mg by mouth daily   polyethylene glycol (MIRALAX) 17 g packet   Yes No   Sig: Take 17 g by mouth daily as needed for constipation   sertraline (ZOLOFT) 100 MG tablet   Yes No   Sig: Take 150 mg by mouth daily   sodium chloride (OCEAN) 0.65 % nasal spray   Yes No   Sig: Spray 1 spray into both nostrils 2 times daily   sodium citrate/citric acid (BICITRA) 500-334 MG/5ML SOLN solution   Yes No   Sig: Take 30 mLs by mouth 3 times daily (with meals) After meals      Facility-Administered Medications: None      Physical Exam   Vital Signs: Temp: 97.8  F (36.6  C) Temp src: Temporal BP: (!) 160/127 Pulse: 80   Resp: 18 SpO2: 99 % O2 Device: None (Room air)      Constitutional: awake, alert, cooperative, no apparent distress, and appears stated age, appears older than stated age   HEENT: Normocephalic, atraumatic  Respiratory: Normal work of breathing, good air exchange, clear to auscultation bilaterally, no crackles or wheezing    Cardiovascular: Regular rate and rhythm, no murmurs appreciated  GI: Soft and nontender to palpation, nondistended, no rebound or guarding  Skin: xerosis throughout bilateral LE, otherwise normal skin color, texture,  turgor  Musculoskeletal: No deformities, no edema present  Neurologic: Alert and oriented, no focal deficits noted   Neuropsychiatric: General: normal, calm and normal eye contact     Data   ------------------------- PAST 24 HR DATA REVIEWED -----------------------------------------------    I have personally reviewed the following data over the past 24 hrs:    4.6  \   14.0   / 253     139 98 24.1 (H) /  80   4.1 29 1.61 (H) \       Trop: 50 (H) BNP: N/A       TSH: 274.70 (H) T4: <0.10 (L) A1C: N/A       Imaging results reviewed over the past 24 hrs:   Recent Results (from the past 24 hour(s))   CT Head w/o Contrast    Narrative    EXAM: CT HEAD W/O CONTRAST  LOCATION: [facility]  DATE/TIME: 3/23/2023 7:53 PM    INDICATION: daily headaches  COMPARISON: 01/24/2018  TECHNIQUE: Routine CT Head without IV contrast. Multiplanar reformats. Dose reduction techniques were used.    FINDINGS:  INTRACRANIAL CONTENTS: No intracranial hemorrhage, extraaxial collection, or mass effect.  No CT evidence of acute infarct. Normal parenchymal attenuation. Normal ventricles and sulci.     VISUALIZED ORBITS/SINUSES/MASTOIDS: No intraorbital abnormality. No paranasal sinus mucosal disease. No middle ear or mastoid effusion.    BONES/SOFT TISSUES: No acute abnormality.      Impression    IMPRESSION:  1.  Normal head CT.   XR Chest 2 Views    Narrative    EXAM: XR CHEST 2 VIEWS  LOCATION: [facility]  DATE/TIME: 3/23/2023 8:02 PM    INDICATION: SOB, fatigue  COMPARISON: 06/15/2013      Impression    IMPRESSION: Heart size and pulmonary vascularity normal. Minimal scarring or atelectasis left base, lungs otherwise clear. Convex right thoracic scoliosis.

## 2023-03-24 NOTE — PHARMACY-ADMISSION MEDICATION HISTORY
Admission medication history interview status for this patient is complete. See [facility] admission navigator for allergy information, prior to admission medications and immunization status.     Medication history interview done, indicate source(s): Patient  Medication history resources (including written lists, pill bottles, clinic record): Care Everywhere [[facility]]  Pharmacy: [facility]/pharmacy #[id number] - [place], [place] - [address] [address] [facility] [phone]      Changes made to PTA medication list:  Changed: Sertraline 150mg daily --> 50mg daily [last taken in past 3-6 mo]  Removed: Gabapentin [last take > 6 mo ago], Lorazepam, Miralax, Oxycodone    Actions taken by pharmacist (provider contacted, etc):None     Additional medication history information:    Patient was without insurance within the past several years and had difficulty affording her medications.  She tells me she does have some pills at home, but has not been taking them because she no longer knows which strengths/doses she should be taking.      She states she has had pantoprazole and levothyroxine in the past month, but upon verifying dispenses with pharmacist at her [facility], there have been no refills in the last 12 months.     Medication reconciliation/reorder completed by provider prior to medication history? Yes    Medication Affordability:   Not including over the counter (OTC) medications, was there a time in the past 12 months when you did not take your medications as prescribed because of cost?: Yes  Has this happened in the past 3 months?: Yes     As above, patient was uninsured.  She states she is now insured and her medications should be affordable. She needs new Rxs.     Prior to Admission medications    Medication Sig Last Dose Taking? Auth Provider Long Term End Date   levothyroxine (SYNTHROID/LEVOTHROID) 175 MCG tablet Take 1 tablet (175 mcg) by mouth daily Past Month Yes Jordan Moody MD Yes    pantoprazole (PROTONIX) 40 MG EC tablet  Cardiology Outpatient Progress Note - Jonah Kingston 79 y o  male MRN: [id number]    @ Encounter: [id number]      Patient Active Problem List    Diagnosis Date Noted   • Sacroiliitis [facility])    • History of GI diverticular bleed 09/09/2022   • Hypotension due to drugs 09/07/2022   • Abdominal aortic aneurysm without rupture 09/07/2022   • Rectal bleed 08/30/2022   • Blood in stool 08/30/2022   • Anxiety 08/17/2022   • Urinary retention 07/27/2022   • Solitary kidney, acquired 07/26/2022   • Anemia 03/09/2022   • Claustrophobia 06/11/2021   • Cervical paraspinal muscle spasm 06/11/2021   • Lumbar spondylosis 05/13/2021   • Spinal stenosis of lumbar region 05/13/2021   • DDD (degenerative disc disease), lumbar 05/13/2021   • Low back pain with sciatica 05/13/2021   • Gout 04/29/2021   • Panlobular emphysema ([facility] [address] ) 03/30/2021   • Morbid (severe) obesity due to excess calories ([facility] [address] ) 01/26/2021   • Chronic combined systolic and diastolic congestive heart failure, NYHA class 4 ([address] ) 10/14/2020   • Knee pain, right 07/30/2020   • Impingement syndrome of left shoulder 06/22/2020   • Chronic left shoulder pain 06/15/2020   • Lumbar radiculopathy 05/11/2020   • Essential hypertension 05/06/2020   • Encounter for follow-up examination after completed treatment for malignant neoplasm 06/27/2019   • Diverticulosis of colon with hemorrhage 03/20/2019   • Immunosuppression ([address] ) 03/04/2019   • 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 ([address] ) 03/23/2018   • Hyperlipidemia 01/02/2018   • Insomnia 01/02/2018   • GERD (gastroesophageal reflux disease) 01/02/2018   • Leukocytosis 01/02/2018   • History of squamous cell carcinoma 01/27/2017   • CKD (chronic kidney disease) stage 3, GFR 30-59 ml/min ([address] ) 10/25/2016   • Renal transplant, status post 10/25/2016   • History of heart transplant ([facility] [address] ) 10/25/2016       Assessment:  # Chronic HFpEF, Stage C, NYHA II  Possible due to microvascular Take 40 mg by mouth daily Past Month Yes Unknown, Entered By History     sertraline (ZOLOFT) 50 MG tablet Take 50 mg by mouth daily More than a month Yes Unknown, Entered By History No    sodium citrate/citric acid (BICITRA) 500-334 MG/5ML SOLN solution Take 30 mLs by mouth 3 times daily (with meals) After meals More than a month Yes Unknown, Entered By History            dz, progressive CAV, aging, high MAPs with filling pressures  Diuretic: lasix 40 mg BID  Weight: 200 lbs  NT proBNP: 11/9/22: 1141  10/10/22: 1493  7/28/20: 753    Studies- personally reviewed by me  Echo 10/14/22:  LVEF: 55%  RV: normal    Echo Aug 2021- Dr Andrés Kenney office: EF: 60%    Echo  7/28/20:  LVEF: 55%  RV: normal     [facility] 2/14/19: Proximal circumflex: There was a 100 % stenosis  This lesion is a chronic total occlusion  Mid RCA: There was a tubular 70 % stenosis  An intervention was performed  Area 3 9mm2/stenosis 69%, plaque burden 80%  Intervention: AKHIL 70% mid RCA     # Hx of OHT in 1998; s/p Kidney tx in 2007  Diag:  --TTE 7/28/2020: LVEF>55%  Normal RV size and function  Dagger shaped RVOT PW doppler  Trace MR and TR  LVOT Vmax ~0 8 m/s      Immunosuppression:  --Continue tacrolimus 1 mg PO q12hrs  --Continue myfortiq 180 mg PO q12hrs     Tacrolimus level   9/15/22: 6  11/30/21: 8  8/2/21: 6   7/23/21: 10  8/8/20: 4  7/29: 4 4    # CAV w/ hx of PCI to mid RCA in 2/2019  [facility] 11/16/22: (done for LOPEZ)  Prox LCx  and widely patent mid RCA stent unchanged from 2/14/19 study  Ascending aorta graft,   LVEDP 10 mmHg  Angina: occasional, treated with Imdur and SL nitro and higher dose coreg  # HLD: atorvastatin 40 mg daily  11/6/22: LDL 41, HDL 39  # HTN: As above  Continue Cardizem  mg daily (dose adjustments will affect tacrolimus levels), imdur   # Obesity  # CKD III: Cr 1 51 on 11/16/22  # GI bleed- Hospital admit 8/30 to 9/3 with rectal bedding  Colonoscopy multiple diverticula with bleeding diverticuai that was clipped  Then readmitted with hypotension and bright red blood per rectum      TODAY'S PLAN:  States furosemide works better than torsemide  So will do 40 mg BID  Continue immunosuppression as above  Still gets occasional angina, relieved with SL nitro, no new epicardial lesions, continue Imdur, coreg and SL nitro  Needs Tac level- last Tac level 9/15 6, Cr stable at 1 5  Continue atorvastatin w/ hx of CAD/ CAV- needs lipids checked  BP controlled on meds as above  Echo was normal 10/14    HPI:      78 yo male following for HFpEF  He has hx of OHT 22 years ago at [facility], renal transplant 2007 at [facility], HTN, hyperlipidemia, obesity, CAD with hx of PCI to RCA in Feb 2019  We saw him in hospital in July 2020 for volume overload  Pt developed angina, on Imdur 120 mg daily  Last echo done showed EF: 60%  Pt reports occasional chest pain relieved by sl Nitro  Now on Imdur 60 mg  Hospital admit 8/30 to 9/3 with rectal bedding  Colonoscopy multiple diverticula with bleeding diverticuai that was clipped  Then readmitted with hypotension and bright red blood per rectum   Hydralazine stopped, imdur decreased    Interval History:   Last time volume overloaded so double diuretic a few days  Given persistent dyspnea and OHT, did [facility] 11/16/22: Prox LCx  and widely patent mid RCA stent unchanged from 2/14/19 study  Ascending aorta graft,   LVEDP 10 mmHg    Past Medical History:   Diagnosis Date   • Achilles tendinitis, unspecified leg     Last assessed - 4/29/14   • Actinic keratosis     Scalp and face   • Acute MI, inferolateral wall (HCC) 01/02/2018   • Anxiety    • Arthritis    • Arthritis of shoulder region, degenerative     Last assessed - 7/23/15   • Bleeding from anus    • Bone spur     Last assessed - 4/29/14   • CHF (congestive heart failure) (formerly [facility])    • Chronic pain disorder     lumbar   • Closed displaced fracture of fifth metatarsal bone of left foot with routine healing     Last assessed - 4/20/16   • Coronary artery disease    • COVID-19 08/17/2022   • Degenerative joint disease (DJD) of hip     Last assessed - 4/1/15   • Displaced fracture of fifth metatarsal bone, left foot, initial encounter for closed fracture     Last assessed - 5/13/16   • Displaced fracture of fourth metatarsal bone, left foot, initial encounter for closed fracture     Last assessed - 5/13/16 • Dyspnea on exertion     current 4/2021   • GERD (gastroesophageal reflux disease)    • Gout     Last assessed - 4/29/14   • H/O angioplasty     heart attack   • H/O kidney transplant 2007   • Herpes zoster    • History of heart transplant ([facility] [address] ) 12/04/1997    at [facility]; acute rejection in 2006   • History of transfusion 1997    during heart transplant, no rx   • Hyperlipidemia    • Hypertension    • Mass of face     Last assessed - 12/29/16   • Myocardial infarction [facility])    • Past heart attack     [id number]  [id number],1997   • Recurrent UTI     Last assessed - 1/28/16   • Renal disorder     currently only one functional kidney   • S/P CABG x 3     03/22/1982   • Skin lesion of right lower extremity     Resolved - 8/4/16   • Sleep apnea    • Small bowel obstruction ([facility] [address] )     Last assessed - 11/4/16   • Solitary kidney, acquired    • Umbilical hernia    • Ventral hernia     Last assessed - 1/28/16   • Vesico-ureteral reflux     Last assessed - 12/21/15       Review of Systems   Constitutional: Negative for activity change, appetite change, fatigue and unexpected weight change (down 4 lbs)  HENT: Negative for congestion and nosebleeds  Eyes: Negative  Respiratory: Positive for shortness of breath  Negative for cough and chest tightness  Cardiovascular: Negative for palpitations and leg swelling  Gastrointestinal: Negative for abdominal distention  Endocrine: Negative  Genitourinary: Negative  Musculoskeletal: Negative for back pain  Skin: Negative  Neurological: Negative for dizziness, syncope and weakness  Hematological: Negative  Psychiatric/Behavioral: Negative  Allergies   Allergen Reactions   • Aspartame - Food Allergy Rash   • Atenolol Other (See Comments)     Category: Allergy; Annotation - [id number]: all forms  Edema of skin    Category: Allergy;  Annotation - [id number]: all forms  Edema of skin   • Cyclosporine Diarrhea   • Monosodium Glutamate - Food Allergy Rash   • Morphine Other (See Comments) and Hallucinations     Hallucinations  Hallucinations   • Penicillins Rash and Other (See Comments)     Category: Allergy; Annotation - [id number]: all forms  md cerda meropenem  Category: Allergy; Annotation - [id number]: all forms   • Sucralose - Food Allergy Rash   • Sulfa Antibiotics Rash       Current Outpatient Medications:   •  acetaminophen (TYLENOL) 325 mg tablet, Take 3 tablets (975 mg total) by mouth every 8 (eight) hours, Disp: 90 tablet, Rfl: 0  •  allopurinol (ZYLOPRIM) 100 mg tablet, Take 200 mg by mouth 2 (two) times a day per patient taking 100mg in AM and 200mg PM , Disp: , Rfl:   •  Aspirin 81 MG CAPS, Take 81 mg by mouth in the morning, Disp: , Rfl:   •  atorvastatin (LIPITOR) 40 mg tablet, Take 40 mg by mouth daily, Disp: , Rfl:   •  Calcium Carbonate 1500 (600 Ca) MG TABS, Take 600 mg by mouth daily , Disp: , Rfl:   •  carvedilol (COREG) 25 mg tablet, Take 1 tablet by mouth 2 (two) times a day Hold 9/6 and 9/7/22 VO via Tila Ocampo 9/6/22 , Disp: , Rfl:   •  isosorbide mononitrate (IMDUR) 120 mg 24 hr tablet, Take 60 mg by mouth daily, Disp: , Rfl:   •  multivitamin (THERAGRAN) TABS, Take 1 tablet by mouth daily  , Disp: , Rfl:   •  multivitamin (THERAGRAN) TABS, Take 1 tablet by mouth, Disp: , Rfl:   •  mycophenolic acid (MYFORTIC) 940 mg EC tablet, Take 180 mg by mouth 2 (two) times a day  , Disp: , Rfl:   •  OMEGA-3-ACID ETHYL ESTERS PO, Take 1 g by mouth daily  , Disp: , Rfl:   •  omeprazole (PriLOSEC) 20 mg delayed release capsule, Take 2 capsules (40 mg total) by mouth every evening, Disp: 30 capsule, Rfl: 0  •  Polyvinyl Alcohol-Povidone (REFRESH OP), Apply to eye as needed, Disp: , Rfl:   •  prednisoLONE acetate (PRED FORTE) 1 % ophthalmic suspension, , Disp: , Rfl:   •  predniSONE 2 5 mg tablet, Take 2 5 mg by mouth daily, Disp: , Rfl:   •  tacrolimus (PROGRAF) 1 mg capsule, Take 1 mg by mouth every 12 (twelve) hours, Disp: , Rfl:   • tamsulosin (FLOMAX) 0 4 mg, Take 1 capsule (0 4 mg total) by mouth daily with dinner, Disp: 90 capsule, Rfl: 1  •  torsemide (DEMADEX) 20 mg tablet, Take 2 tablets (40 mg total) by mouth daily (Patient taking differently: Take 80 mg by mouth daily 2 in the morning and 2 at noon), Disp: 180 tablet, Rfl: 1  •  zolpidem (AMBIEN) 10 mg tablet, Take 10 mg by mouth daily at bedtime  , Disp: , Rfl:   •  benzonatate (TESSALON PERLES) 100 mg capsule, Take 1 capsule (100 mg total) by mouth 3 (three) times a day as needed for cough (Patient not taking: Reported on 2022), Disp: 30 capsule, Rfl: 0  •  gabapentin (NEURONTIN) 100 mg capsule, Take 1 PO HS x 5 days, then 2 PO HS x 5 days, then 3 PO HS (Patient not taking: Reported on 2022), Disp: 90 capsule, Rfl: 1  •  nitroglycerin (NITROSTAT) 0 4 mg SL tablet, Place 1 tablet (0 4 mg total) under the tongue every 5 (five) minutes as needed for chest pain, Disp: 25 tablet, Rfl: 4    Social History     Socioeconomic History   • Marital status:      Spouse name: Not on file   • Number of children: Not on file   • Years of education: Not on file   • Highest education level: Not on file   Occupational History   • Not on file   Tobacco Use   • Smoking status: Former     Types: Cigars, Pipe     Quit date: 45     Years since quittin 9   • Smokeless tobacco: Never   • Tobacco comments:     Smoked only cigars ;NO cigarettes  ; Quit at age 42 per [employer]    Vaping Use   • Vaping Use: Never used   Substance and Sexual Activity   • Alcohol use:  Yes     Alcohol/week: 1 0 standard drink     Types: 1 Glasses of wine per week     Comment: occasional   x4 monthly   • Drug use: No   • Sexual activity: Not Currently   Other Topics Concern   • Not on file   Social History Narrative   • Not on file     Social Determinants of Health     Financial Resource Strain: Not on file   Food Insecurity: No Food Insecurity   • Worried About [address] in the Last Year: Never true   • Ran Out of Food in the Last Year: Never true   Transportation Needs: No Transportation Needs   • Lack of Transportation (Medical): No   • Lack of Transportation (Non-Medical): No   Physical Activity: Not on file   Stress: Not on file   Social Connections: Not on file   Intimate Partner Violence: Not on file   Housing Stability: Low Risk    • Unable to Pay for Housing in the Last Year: No   • Number of Places Lived in the Last Year: 1   • Unstable Housing in the Last Year: No       Family History   Problem Relation Age of Onset   • Hypertension Mother    • Heart disease Mother    • Coronary artery disease Mother    • Pancreatic cancer Mother    • Diabetes Father    • Coronary artery disease Father    • Heart disease Sister    • Lung cancer Sister    • Heart disease Brother    • Hypertension Brother    • Colon cancer Brother    • Thyroid cancer Daughter    • Stroke Paternal Grandmother    • Heart disease Sister    • Hypertension Sister    • Heart disease Sister    • Hypertension Sister    • Heart disease Brother    • Hypertension Brother        Physical Exam:    Vitals: Blood pressure 100/60, pulse 90, height 5' 5" (1 651 m), weight 90 7 kg (200 lb), SpO2 90 %  , Body mass index is 33 28 kg/m² ,   Wt Readings from Last 3 Encounters:   12/20/22 90 7 kg (200 lb)   11/23/22 92 5 kg (204 lb)   11/16/22 92 1 kg (203 lb)       Physical Exam  Constitutional:       Appearance: He is well-developed  HENT:      Head: Normocephalic and atraumatic  Eyes:      Pupils: Pupils are equal, round, and reactive to light  Neck:      Vascular: No JVD  Cardiovascular:      Rate and Rhythm: Normal rate and regular rhythm  Heart sounds: No murmur heard  Pulmonary:      Effort: Pulmonary effort is normal  No respiratory distress  Breath sounds: Normal breath sounds  Abdominal:      General: There is no distension  Palpations: Abdomen is soft  Tenderness: There is no abdominal tenderness  Musculoskeletal:         General: Normal range of motion  Cervical back: Normal range of motion  Skin:     General: Skin is warm and dry  Findings: No rash  Neurological:      Mental Status: He is alert and oriented to person, place, and time  Labs & Results:    Lab Results   Component Value Date    SODIUM 141 11/18/2022    K 3 9 11/18/2022     (H) 11/18/2022    CO2 26 11/18/2022    BUN 25 11/18/2022    CREATININE 1 44 (H) 11/18/2022    GLUC 149 (H) 11/18/2022    CALCIUM 9 1 11/18/2022     Lab Results   Component Value Date    WBC 12 88 (H) 11/09/2022    HGB 11 7 (L) 11/09/2022    HCT 38 6 11/09/2022    MCV 99 (H) 11/09/2022     11/09/2022     Lab Results   Component Value Date    BNP 88 06/09/2015      Lab Results   Component Value Date    CHOLESTEROL 122 11/16/2022    CHOLESTEROL 115 02/14/2019    CHOLESTEROL 123 11/15/2018     Lab Results   Component Value Date    HDL 39 (L) 11/16/2022    HDL 46 02/14/2019    HDL 41 11/15/2018     Lab Results   Component Value Date    TRIG 212 (H) 11/16/2022    TRIG 116 02/14/2019    TRIG 190 (H) 11/15/2018     Lab Results   Component Value Date    [place] 83 11/16/2022    [place] 69 02/14/2019    [place] 82 11/15/2018       EKG personally reviewed by Eli Comer  Counseling / Coordination of Care  Time spent today 25 minutes  Greater than 50% of total time was spent with the patient and / or family counseling and / or coordination of care  We discussed diagnoses, most recent studies, tests and any changes in treatment plan    Thank you for the opportunity to participate in the care of this patient      [address] [facility] PULMONARY HYPERTENSION  MEDICAL DIRECTOR OF [place]

## 2023-03-24 NOTE — ED PROVIDER NOTES
History     Chief Complaint:  Headache, Fatigue     HPI   Faby Yeung is a 57 year old female with history of hypothyroidism and medullary sponge kidney with frequent UTIs and kidney stones who presents with multiple complaints over the past month, particularly a worsening headache and increasing fatigue with exertion. Notes accompanying shortness of breath with her fatigue. Also reports headaches with exertion and when looking down over the past month. She states she also feels more swollen in the face from the neck up and reports a new raspy voice. Has other increased swelling in her bilateral hands. Also reports some brief visual disturbance recently but states this is resolved. Patient does have some hematuria too. Of note, she did have a fall on Monday at which time she hit her head but no loss of consciousness from this. Patient denies chest pain or leg swelling. Has history of Raynaud's but no new/worsened numbness or tingling to hands. No weakness. Denies abdominal pain or appetite change. No drug or daily alcohol use. Patient is a former smoker.    Independent Historian:   None - Patient Only    ROS:  Review of Systems   Constitutional: Positive for fatigue. Negative for appetite change.   HENT: Positive for facial swelling.    Eyes: Positive for visual disturbance (resolved).   Respiratory: Positive for shortness of breath.    Cardiovascular: Negative for chest pain and leg swelling.   Gastrointestinal: Negative for abdominal pain.   Genitourinary: Positive for hematuria.   Neurological: Positive for headaches. Negative for weakness and numbness.        (-) loss of consciousness   All other systems reviewed and are negative.    Allergies:  Penicillins  Ibuprofen Sodium  Levaquin  Sulfa Drugs   Nitrofurantoin  Hydroxychloroquine    Medications:    Gabapentin  Levothyroxine  Lorazepam  Percocet  Pantoprazole  Sertraline  Polyethylene glycol  Sodium chloride    Past Medical History:     Anxiety  Depressive disorder  GERD  Blood clots  Leukocytopenia  Nephrolithiasis  Raynaud's syndrome  Scoliosis  Gastritis and gastroduodenitis  Tobacco use disorder  Incisional hernia  Leukopenia  Raynaud's syndrome  Pyelonephritis  Hypothyroidism  UTI  Reactive depression  DJD cervical  Chronic pain  Anemia  PE  Paroxysmal digital cyanosis  Nephrocalcinosis  Renal tubular acidosis  Hypermetropia  Medullary sponge kidney     Past Surgical History:    Ankle surgery, left, x3  Appendectomy  Arthrodesis ankle   section x2  Combined cystoscopy, insert stent ureters, left  Lithotripsy  Hernia repair  DESTINY and BSO  Nephrolithotomy  I&D foot, combined  Nephrostomy tube placement, left  Right wrist and ankle surgery  Scoliosis surgery, removal of hardware  Hernia repair  Carpal tunnel release, bilateral  Small intestine surgery  Ureteroscopy    Family History:    Breast cancer  Esophageal cancer  Cirrhosis    Social History:  The patient presented alone.  The patient arrived in a private vehicle.  [facility]     Physical Exam     Patient Vitals for the past 24 hrs:   BP Temp Temp src Pulse Resp SpO2   23 -- -- -- -- -- 99 %   23 (!) 160/127 -- -- 80 -- 99 %   23 -- -- -- -- -- 99 %   23 -- -- -- -- -- 97 %   23 -- -- -- -- -- 97 %   23 -- -- -- -- -- 99 %   23 (!) 157/113 -- -- 71 -- 95 %   23 -- -- -- -- -- 99 %   23 -- -- -- -- -- 99 %   23 -- -- -- -- -- 99 %   23 (!) 160/113 -- -- 74 -- --   23 (!) 160/108 97.8  F (36.6  C) Temporal 88 18 95 %      Physical Exam  General: Patient is awake, alert  Head: The scalp, face, and head appear normal  Neck: Normal range of motion.   CV: Regular rate and rhythm. slightly pale fingertips, history of Raynaud's syndrome  Resp: Lungs are clear without wheezes or rales. No respiratory distress.   GI: Abdomen is soft, no  rigidity, guarding, or rebound. No distension. No tenderness to palpation in any quadrant.     MS: Normal tone. Joints grossly normal without effusions. No asymmetric leg swelling, calf or thigh tenderness.    Skin: lower extremity venous stasis changes. Normal capillary refill noted  Neuro: CN's II-XII without obvious abnormality.  Gait is normal w/o ataxia.  Neg Romberg.  5/5 UE and LE strength which is symmetrical.   Reflexes are 2+ and symmetrical. Negative Pronator drift.    Finger to Nose testing is intact bilaterally.    Light touch is symmetrical bilateral UE's and LE's.  PERRLA, EOMI.    No meningismus and full neck AROM/PROM.   Psych:  Normal affect.  Appropriate interactions.    Emergency Department Course   ECG  ECG results from 03/23/23   EKG 12-lead, tracing only     Value    Systolic Blood Pressure     Diastolic Blood Pressure     Ventricular Rate 71    Atrial Rate 71    NY Interval 170    QRS Duration 86        QTc 423    P Axis 50    R AXIS 52    T Axis 53    Interpretation ECG      Sinus rhythm  Normal ECG  When compared with ECG of 22-MAR-2016 16:00,  No significant change was found       Imaging:  XR Chest 2 Views   Final Result   IMPRESSION: Heart size and pulmonary vascularity normal. Minimal scarring or atelectasis left base, lungs otherwise clear. Convex right thoracic scoliosis.      CT Head w/o Contrast   Final Result   IMPRESSION:   1.  Normal head CT.         Report per radiology    Laboratory:  Labs Ordered and Resulted from Time of ED Arrival to Time of ED Departure   BASIC METABOLIC PANEL - Abnormal       Result Value    Sodium 139      Potassium 4.1      Chloride 98      Carbon Dioxide (CO2) 29      Anion Gap 12      Urea Nitrogen 24.1 (*)     Creatinine 1.61 (*)     Calcium 10.4 (*)     Glucose 80      GFR Estimate 37 (*)    TROPONIN T, HIGH SENSITIVITY - Abnormal    Troponin T, High Sensitivity 55 (*)    TSH WITH FREE T4 REFLEX - Abnormal    .70 (*)    ROUTINE UA WITH  MICROSCOPIC REFLEX TO CULTURE - Abnormal    Color Urine Yellow      Appearance Urine Slightly Cloudy (*)     Glucose Urine Negative      Bilirubin Urine Negative      Ketones Urine Negative      Specific Gravity Urine 1.014      Blood Urine Large (*)     pH Urine 6.5      Protein Albumin Urine 70 (*)     Urobilinogen Urine Normal      Nitrite Urine Positive (*)     Leukocyte Esterase Urine Large (*)     Bacteria Urine Many (*)     WBC Clumps Urine Present (*)     Mucus Urine Present (*)     RBC Urine >182 (*)     WBC Urine 78 (*)     Squamous Epithelials Urine 1     T4 FREE - Abnormal    Free T4 <0.10 (*)    TROPONIN T, HIGH SENSITIVITY - Abnormal    Troponin T, High Sensitivity 50 (*)    CBC WITH PLATELETS AND DIFFERENTIAL    WBC Count 4.6      RBC Count 4.55      Hemoglobin 14.0      Hematocrit 43.0      MCV 95      MCH 30.8      MCHC 32.6      RDW 14.4      Platelet Count 253      % Neutrophils 55      % Lymphocytes 31      % Monocytes 7      % Eosinophils 6      % Basophils 1      % Immature Granulocytes 0      NRBCs per 100 WBC 0      Absolute Neutrophils 2.6      Absolute Lymphocytes 1.4      Absolute Monocytes 0.3      Absolute Eosinophils 0.3      Absolute Basophils 0.0      Absolute Immature Granulocytes 0.0      Absolute NRBCs 0.0     URINE CULTURE      Emergency Department Course & Assessments:     Interventions:  Medications   levothyroxine (SYNTHROID/LEVOTHROID) tablet 175 mcg (has no administration in time range)   cefTRIAXone (ROCEPHIN) 1 g vial to attach to  mL bag for ADULTS or NS 50 mL bag for PEDS (0 g Intravenous Stopped 3/23/23 2234)      Assessments:  1956 I obtained history and examined the patient as noted above.  2324 I rechecked the patient and explained findings. I discussed plan for admission and the patient is in agreement.    Independent Interpretation (X-rays, CTs, rhythm strip):  CXR is negative for PNA    Consultations/Discussion of Management or Tests:  ED Course as of  03/23/23 2347   Thu Mar 23, 2023   2343 I consulted with Dr. Smith, hospitalist, regarding the patient's history and presentation here in the emergency department who accepted the patient for admission.     Disposition:  The patient was admitted to the hospital under the care of Dr. Hawkins.     Impression & Plan      Medical Decision Making:  This is a pleasant 57-year-old woman with past medical history of medullary sponge kidney disease, hypothyroidism and obesity who presents to the emergency department with worsening fatigue, dyspnea on exertion and generalized swelling over the past month.  She also notes she has not been taking any of her medications including her Synthroid in the past month.  Also notes a daily headache without any significant trauma.  Upon initial evaluation here she is hypertensive but otherwise hemodynamically stable with normal vital signs.  She is afebrile and oxygenating well on room air.  On physical exam she is a normal neurologic examination.  No significant peripheral edema to raise suspicion for volume overload or congestive heart failure.  No calf swelling or tenderness.  Low concern for PE.  Chest x-ray was obtained which did not show any signs of pneumothorax, widened this time, pleural effusion or pulmonary edema.  EKG was obtained which shows normal sinus rhythm without any evidence of ischemia nor dysrhythmia.  However she did have an elevated troponin at 55.  Blood work was also notable for elevated TSH and low free T4 consistent with her diagnosis of hypothyroidism and noncompliance with her medication.  My suspicion is that her shortness of breath and fatigue are more likely driven by her inconsistency with her Synthroid rather than a true cardiac event.  However will benefit from observation admission for cardiac evaluation and reinitiation of her home medications.  Due to her daily headache, CT of her head was obtained which thankfully did not show any signs of  mass, intracranial hemorrhage or infarction.  I discussed my plan for admission and further cardiac work-up with the patient and she is amenable at this time.  All questions were answered patient be admitted in stable condition.    Addendum: Patient found to have UTI, has had many in the past. Will culture and start on Ceftriaxone.     Diagnosis:    ICD-10-CM    1. Hypertension, unspecified type  I10       2. Hypothyroidism, unspecified type  E03.9       3. Elevated troponin  R77.8       4. Dyspnea on exertion  R06.09          Scribe Disclosure:  I, Meghna Gallegos, am serving as a scribe at 8:24 PM on 3/23/2023 to document services personally performed by Will Villalta MD based on my observations and the provider's statements to me.     3/23/2023   Will Villalta MD Battista, Christopher Joseph, MD  03/24/23 0108       Will Villalta MD  03/24/23 1500

## 2023-03-25 VITALS
HEART RATE: 85 BPM | BODY MASS INDEX: 28.51 KG/M2 | WEIGHT: 177.4 LBS | DIASTOLIC BLOOD PRESSURE: 99 MMHG | SYSTOLIC BLOOD PRESSURE: 136 MMHG | TEMPERATURE: 98 F | RESPIRATION RATE: 17 BRPM | OXYGEN SATURATION: 100 % | HEIGHT: 66 IN

## 2023-03-25 LAB
BACTERIA UR CULT: ABNORMAL
MAGNESIUM SERPL-MCNC: 2 MG/DL (ref 1.7–2.3)
PHOSPHATE SERPL-MCNC: 4.2 MG/DL (ref 2.5–4.5)
POTASSIUM SERPL-SCNC: 4 MMOL/L (ref 3.4–5.3)

## 2023-03-25 PROCEDURE — 84100 ASSAY OF PHOSPHORUS: CPT | Performed by: STUDENT IN AN ORGANIZED HEALTH CARE EDUCATION/TRAINING PROGRAM

## 2023-03-25 PROCEDURE — 83735 ASSAY OF MAGNESIUM: CPT | Performed by: STUDENT IN AN ORGANIZED HEALTH CARE EDUCATION/TRAINING PROGRAM

## 2023-03-25 PROCEDURE — 250N000013 HC RX MED GY IP 250 OP 250 PS 637: Performed by: INTERNAL MEDICINE

## 2023-03-25 PROCEDURE — 250N000013 HC RX MED GY IP 250 OP 250 PS 637: Performed by: STUDENT IN AN ORGANIZED HEALTH CARE EDUCATION/TRAINING PROGRAM

## 2023-03-25 PROCEDURE — 99238 HOSP IP/OBS DSCHRG MGMT 30/<: CPT | Performed by: PHYSICIAN ASSISTANT

## 2023-03-25 PROCEDURE — 36415 COLL VENOUS BLD VENIPUNCTURE: CPT | Performed by: STUDENT IN AN ORGANIZED HEALTH CARE EDUCATION/TRAINING PROGRAM

## 2023-03-25 PROCEDURE — 84132 ASSAY OF SERUM POTASSIUM: CPT | Performed by: INTERNAL MEDICINE

## 2023-03-25 PROCEDURE — G0378 HOSPITAL OBSERVATION PER HR: HCPCS

## 2023-03-25 RX ORDER — NITROFURANTOIN 25; 75 MG/1; MG/1
100 CAPSULE ORAL 2 TIMES DAILY
Qty: 6 CAPSULE | Refills: 0 | Status: SHIPPED | OUTPATIENT
Start: 2023-03-25 | End: 2023-03-28

## 2023-03-25 RX ORDER — SODIUM CITRATE AND CITRIC ACID 334; 500 MG/5ML; MG/5ML
30 LIQUID ORAL
Qty: 473 ML | Refills: 3 | Status: SHIPPED | OUTPATIENT
Start: 2023-03-25

## 2023-03-25 RX ORDER — PANTOPRAZOLE SODIUM 40 MG/1
40 TABLET, DELAYED RELEASE ORAL DAILY
Qty: 90 TABLET | Refills: 1 | Status: SHIPPED | OUTPATIENT
Start: 2023-03-25

## 2023-03-25 RX ORDER — LEVOTHYROXINE SODIUM 175 UG/1
175 TABLET ORAL DAILY
Qty: 90 TABLET | Refills: 1 | Status: SHIPPED | OUTPATIENT
Start: 2023-03-25

## 2023-03-25 RX ADMIN — PANTOPRAZOLE SODIUM 40 MG: 40 TABLET, DELAYED RELEASE ORAL at 09:00

## 2023-03-25 RX ADMIN — LEVOTHYROXINE SODIUM 200 MCG: 0.1 TABLET ORAL at 09:00

## 2023-03-25 ASSESSMENT — ACTIVITIES OF DAILY LIVING (ADL)
ADLS_ACUITY_SCORE: 22

## 2023-03-25 NOTE — PLAN OF CARE
PRIMARY DIAGNOSIS: SOB, ABNORMAL LABS  OUTPATIENT/OBSERVATION GOALS TO BE MET BEFORE DISCHARGE:  1. Stable metabolic panel and electrolytes: Yes (T4 improving)    2. Improved drug lab values (if applicable): N/A    3. Stable telemetry: Yes   Interpretation of cardiac rhythm per telemetry tech (if applicable): SR 65 (briefly in 40's per tele tech, provider aware)    Discharge Planner Nurse   Safe discharge environment identified: Yes  Barriers to discharge:  TBD       Entered by: Rossana Trujillo RN 03/25/2023      Please review provider order for any additional goals.   Nurse to notify provider when observation goals have been met and patient is ready for discharge

## 2023-03-25 NOTE — PLAN OF CARE
Patient's After Visit Summary was reviewed with patient.  Patient verbalized understanding of After Visit Summary, recommended follow up and was given an opportunity to ask questions.   Discharge medications sent home with patient/family: YES -- macrobid, levothyroxine, sertraline, Protonix, Bicitra solution.    Discharged with friend.    Discharged in stable condition with friend as ride. Instructed to take 1/2 tablet of zoloft for 2 weeks, then increase to full tablet. AVS reviewed, all questions answered.

## 2023-03-25 NOTE — DISCHARGE SUMMARY
[facility]  Hospitalist Discharge Summary      Date of Admission:  3/23/2023  Date of Discharge:  3/25/2023  Discharging Provider: Xuan Interiano PA-C  Discharge Service: Hospitalist Service    Discharge Diagnoses   Hypothyroidism  Urinary tract infection  Dyspnea on exertion  Fatigue      Follow-ups Needed After Discharge   Follow-up Appointments     Follow-up and recommended labs and tests       We recommend you have your thyroid test rechecked in 4 to 6 weeks.  You   will need to see a primary care provider for this             Unresulted Labs Ordered in the Past 30 Days of this Admission     No orders found from 2/21/2023 to 3/24/2023.          Discharge Disposition   Discharged to home  Condition at discharge: Stable      Hospital Course    Ms. Faby Yeung is a 57 year old female with history of hypothyroidism, medullary sponge kidneys, recurrent UTI admitted on 3/23/2023 dyspnea on exertion and worsening fatigue in the setting of missing her medications for about a month due to being out of town and financial constraints.    On admission she was afebrile with pressure 160/127, pulse 80 and breathing comfortably on room air without hypoxia.  Lab work was remarkable for creatinine 1.61, BUN 24.1, calcium 10.4, normal anion gap, high-sensitivity troponin of 55 with 2-hour repeat of 50, .7 with free T4 less than 0.1 and normal CBC.  UA appeared infected with large amount of white blood cells, leukocyte Estrace and nitrites.  She was started on ceftriaxone and urine culture grew pansensitive E. coli.  Chest x-ray on admission was negative and CT head was normal.  EKG showed a sinus rhythm.  During admission she was monitored on telemetry without any abnormalities and echocardiogram was performed which did not show any significant heart failure or valve abnormalities.  She was started on levothyroxine and suspects that her symptoms will improve with starting this.  She  reports she had similar symptoms in the past when she was not taking levothyroxine.  She does report feeling like something is stuck in her throat while swallowing and says that this was a symptom when she was not taking her levothyroxine before.  If this does not improve I recommend she see an ENT doctor.  She will be discharged with 3 more days of Macrobid and prescriptions for all of her medicines.    She should have labs rechecked in 4 to 6 weeks.  She may need further work-up for elevated creatinine and elevated calcium.      Consultations This Hospital Stay   PHYSICAL THERAPY ADULT IP CONSULT    Code Status   Full Code    Time Spent on this Encounter   I, Xuan Interiano PA-C, personally saw the patient today and spent less than or equal to 30 minutes discharging this patient.       Xuan Interiano PA-C  [facility] OBSERVATION DEPT  [address]  [facility] [address]  Phone: [phone]  ______________________________________________________________________    Physical Exam   Vital Signs: Temp: 98  F (36.7  C) Temp src: Oral BP: (!) 136/99 Pulse: 85   Resp: 17 SpO2: 100 % O2 Device: None (Room air)    Weight: 177 lbs 6.4 oz  General Appearance: Alert and oriented x3  Respiratory: Clear to auscultation bilaterally  Cardiovascular: RRR without murmur  GI: Bowel sounds are present without tenderness  Skin: No rashes or open sores are noted         Primary Care Physician   Physician No Ref-Primary    Discharge Orders      Reason for your hospital stay    You were admitted for multiple concerns including shortness of breath, fatigue, facial swelling and not taking her medicines for over 1 month.  On admission your work-up was significant for elevated TSH with low T4 and levothyroxine was restarted.  Your urinalysis was positive for UTI and you were started on antibiotic.  You will complete 3 more days at home.  You had some mild kidney dysfunction that you will need to have  followed as an outpatient.  Your cardiac enzymes were mildly elevated and you were monitored on telemetry without any abnormality and echocardiogram looked good.  We suspect that your symptoms are related to missing your medicines for 1 month.  If your throat symptoms do not improve after being on levothyroxine you could see an ENT doctor or GI doctor regarding this.     Follow-up and recommended labs and tests     We recommend you have your thyroid test rechecked in 4 to 6 weeks.  You will need to see a primary care provider for this     Activity    Your activity upon discharge: activity as tolerated     Diet    Follow this diet upon discharge: Regular       Significant Results and Procedures   7-Day Micro Results     Collected Updated Procedure Result Status      03/23/2023 2041 03/25/2023 0651 Urine Culture [[id number]]    (Abnormal)   Urine, Clean Catch    Final result Component Value   Culture >100,000 CFU/mL Escherichia coli        Susceptibility      Escherichia coli      MICAH      Ampicillin 4 ug/mL Susceptible      Ampicillin/ Sulbactam <=2 ug/mL Susceptible      Cefazolin <=4 ug/mL Susceptible  [1]       Cefepime <=1 ug/mL Susceptible      Cefoxitin <=4 ug/mL Susceptible      Ceftazidime <=1 ug/mL Susceptible      Ceftriaxone <=1 ug/mL Susceptible      Ciprofloxacin <=0.25 ug/mL Susceptible      Gentamicin <=1 ug/mL Susceptible      Levofloxacin <=0.12 ug/mL Susceptible      Nitrofurantoin <=16 ug/mL Susceptible      Piperacillin/Tazobactam <=4 ug/mL Susceptible      Tobramycin <=1 ug/mL Susceptible      Trimethoprim/Sulfamethoxazole <=1/19 ug/mL Susceptible                   [1]  Cefazolin MICAH breakpoints are for the treatment of uncomplicated urinary tract infections. For the treatment of systemic infections, please contact the laboratory for additional testing.                     Most Recent TSH and T4:Recent Labs   Lab Test 03/24/23  0834   .70*   T4 0.18*   ,   Results for orders placed or  performed during the hospital encounter of 23   XR Chest 2 Views    Narrative    EXAM: XR CHEST 2 VIEWS  LOCATION: [facility]  DATE/TIME: 3/23/2023 8:02 PM    INDICATION: SOB, fatigue  COMPARISON: 06/15/2013      Impression    IMPRESSION: Heart size and pulmonary vascularity normal. Minimal scarring or atelectasis left base, lungs otherwise clear. Convex right thoracic scoliosis.   CT Head w/o Contrast    Narrative    EXAM: CT HEAD W/O CONTRAST  LOCATION: [facility]  DATE/TIME: 3/23/2023 7:53 PM    INDICATION: daily headaches  COMPARISON: 2018  TECHNIQUE: Routine CT Head without IV contrast. Multiplanar reformats. Dose reduction techniques were used.    FINDINGS:  INTRACRANIAL CONTENTS: No intracranial hemorrhage, extraaxial collection, or mass effect.  No CT evidence of acute infarct. Normal parenchymal attenuation. Normal ventricles and sulci.     VISUALIZED ORBITS/SINUSES/MASTOIDS: No intraorbital abnormality. No paranasal sinus mucosal disease. No middle ear or mastoid effusion.    BONES/SOFT TISSUES: No acute abnormality.      Impression    IMPRESSION:  1.  Normal head CT.   Echocardiogram Complete     Value    LVEF  55-60%    Narrative    [id number]  [id number]  [id number]  [id number]^JUAN JOSE^CONCEPCION     [facility]  Echocardiography Laboratory  [address] [place] [address]     Name: LIZZ HOLGUIN  MRN: [id number]  : 1965  Study Date: 2023 07:46 AM  Age: 57 yrs  Gender: Female  Patient Location: [facility]  Reason For Study: Dyspnea  Ordering Physician: CONCEPCION INGRAM  Performed By: Jose Sullivan RDCS     BSA: 1.9 m2  Height: 66 in  Weight: 175 lb  BP: 148/110 mmHg  ______________________________________________________________________________  Procedure  Complete Portable Echo Adult. [employer]ison (NDC #[id number]) given  intravenously.  ______________________________________________________________________________  Interpretation Summary     The visual ejection fraction is 55-60%.  Left ventricular systolic function is normal.  There is trace aortic regurgitation.  The study was technically difficult.  ______________________________________________________________________________  Left Ventricle  The left ventricle is normal in size. There is mild concentric left  ventricular hypertrophy. Grade I or early diastolic dysfunction. The visual  ejection fraction is 55-60%. Left ventricular systolic function is normal.  Diastolic Doppler findings (E/E' ratio and/or other parameters) suggest left  ventricular filling pressures are normal. The left ventricular apex is not  well visualized.     Right Ventricle  The right ventricle is normal size. The right ventricular systolic function is  normal.     Atria  Normal left atrial size. Right atrial size is normal. There is no color  Doppler evidence of an atrial shunt.     Mitral Valve  There is mild (1+) mitral regurgitation.     Tricuspid Valve  There is trace tricuspid regurgitation. Right ventricular systolic pressure  could not be approximated due to inadequate tricuspid regurgitation.     Aortic Valve  There is trace aortic regurgitation. No hemodynamically significant valvular  aortic stenosis.     Pulmonic Valve  There is no pulmonic valvular regurgitation.     Vessels  The aortic root is normal size. The ascending aorta is Borderline dilated.  Inferior vena cava not well visualized for estimation of right atrial  pressure.     Pericardium  There is no pericardial effusion.     Rhythm  Sinus rhythm was noted.  ______________________________________________________________________________  MMode/2D Measurements & Calculations  IVSd: 1.3 cm  LVIDd: 3.3 cm  LVIDs: 2.6 cm  LVPWd: 1.1 cm  FS: 21.5 %  LV mass(C)d: 128.6 grams  LV mass(C)dI: 68.0 grams/m2  Ao root diam: 3.4 cm  LA dimension:  2.8 cm  asc Aorta Diam: 3.5 cm  LA/Ao: 0.82  LA Volume (BP): 25.0 ml  LA Volume Index (BP): 13.2 ml/m2     RWT: 0.69     Doppler Measurements & Calculations  MV E max angel: 40.0 cm/sec  MV A max angel: 67.1 cm/sec  MV E/A: 0.60  MV dec time: 0.30 sec  Ao V2 max: 74.0 cm/sec  Ao max P.0 mmHg  E/E' av.1  Lateral E/e': 5.3  Medial E/e': 8.9     ______________________________________________________________________________  Report approved by: Demarco Lee 2023 10:05 AM               Discharge Medications   Current Discharge Medication List      START taking these medications    Details   nitroFURantoin macrocrystal-monohydrate (MACROBID) 100 MG capsule Take 1 capsule (100 mg) by mouth 2 times daily for 3 days  Qty: 6 capsule, Refills: 0    Associated Diagnoses: Urinary tract infection without hematuria, site unspecified         CONTINUE these medications which have CHANGED    Details   levothyroxine (SYNTHROID/LEVOTHROID) 175 MCG tablet Take 1 tablet (175 mcg) by mouth daily  Qty: 90 tablet, Refills: 1    Associated Diagnoses: Hypothyroidism, unspecified type      pantoprazole (PROTONIX) 40 MG EC tablet Take 1 tablet (40 mg) by mouth daily  Qty: 90 tablet, Refills: 1    Associated Diagnoses: Gastroesophageal reflux disease without esophagitis      sertraline (ZOLOFT) 50 MG tablet Take 0.5-1 tablets (25-50 mg) by mouth daily Take 0.5 tablet for 2 weeks and then increase to full tablet  Qty: 90 tablet, Refills: 1    Associated Diagnoses: Anxiety disorder, unspecified type      sodium citrate/citric acid (BICITRA) 500-334 MG/5ML SOLN solution Take 30 mLs by mouth 3 times daily (with meals) After meals  Qty: 473 mL, Refills: 3    Associated Diagnoses: Hx of nephrolithotomy with removal of calculi           Allergies   Allergies   Allergen Reactions     Penicillins Anaphylaxis     Has tolerated ceftriaxone     Ibuprofen Sodium      Levaquin      Sulfa Drugs

## 2023-03-25 NOTE — PLAN OF CARE
PRIMARY DIAGNOSIS: Elevated Troponin, Facial swelling , Dyspnea on exertion   OUTPATIENT/OBSERVATION GOALS TO BE MET BEFORE DISCHARGE:  1. Ruled out acute coronary syndrome (negative or stable Troponin):  Yes  2. Pain Status: Improved-controlled with oral pain medications.  3. Appropriate provocative testing performed: Yes  - Stress Test Procedure:   - Interpretation of cardiac rhythm per telemetry tech: SR 67    4. Cleared by Consultants (if applicable):No  5. Return to near baseline physical activity: Yes  Discharge Planner Nurse   Safe discharge environment identified: Yes  Barriers to discharge: Yes       Entered by: Priyanka Narvaez RN 03/24/2023 11:55 PM     Please review provider order for any additional goals.   Nurse to notify provider when observation goals have been met and patient is ready for discharge.

## 2023-03-25 NOTE — PLAN OF CARE
"PRIMARY DIAGNOSIS: Elevated Troponin, Facial swelling , Dyspnea on exertion   OUTPATIENT/OBSERVATION GOALS TO BE MET BEFORE DISCHARGE:  1. Ruled out acute coronary syndrome (negative or stable Troponin):  Yes  2. Pain Status: Improved-controlled with oral pain medications.  3. Appropriate provocative testing performed: Yes  - Stress Test Procedure:   - Interpretation of cardiac rhythm per telemetry tech: SR 67    4. Cleared by Consultants (if applicable):No  5. Return to near baseline physical activity: Yes  Discharge Planner Nurse   Safe discharge environment identified: Yes  Barriers to discharge: Yes   A & O X 4. Lung sound clear bilaterally to auscultation. Denies chest pain, shortness of breath, lightheadedness, nausea, or vomit. C/O headache/ migraines. Obtained one time order for Excedrin. Patient on Rocephin once daily. Prn Tums given for heartburn. Peripheral IV saline lock.  Independent in the room . Pain is managed with prn Tylenol. Patient on cardiac monitor, cardiac rhythm SR 67 per telemetry tech.  Afebrile.  BP (!) 132/90 (BP Location: Right arm)   Pulse 72   Temp 97.7  F (36.5  C) (Oral)   Resp 16   Ht 1.676 m (5' 6\")   Wt 80.5 kg (177 lb 6.4 oz)   SpO2 95%   BMI 28.63 kg/m           Entered by: Priyanka Narvaez RN 03/25/2023 6:12 AM     Please review provider order for any additional goals.   Nurse to notify provider when observation goals have been met and patient is ready for discharge.      "

## 2023-03-25 NOTE — PLAN OF CARE
"PRIMARY DIAGNOSIS: SOB, ABNORMAL LABS  OUTPATIENT/OBSERVATION GOALS TO BE MET BEFORE DISCHARGE:  1. Stable metabolic panel and electrolytes: Yes (T4 improving)    2. Improved drug lab values (if applicable): N/A    3. Stable telemetry: Yes   Interpretation of cardiac rhythm per telemetry tech (if applicable): SR 65     Discharge Planner Nurse   Safe discharge environment identified: Yes  Barriers to discharge:  TBD       Entered by: Rossana Trujillo RN 03/25/2023        BP (!) 136/99 (BP Location: Right arm)   Pulse 85   Temp 98  F (36.7  C) (Oral)   Resp 17   Ht 1.676 m (5' 6\")   Wt 80.5 kg (177 lb 6.4 oz)   SpO2 100%   BMI 28.63 kg/m      Reports headache, declined offer of tylenol. Ice applied to neck -- patient reports feeling 'lump' when swallowing. States it does not make her short of breath or interfere with breathing. Up ind, steady gait observed. Tolerating diet. SL.     Please review provider order for any additional goals.   Nurse to notify provider when observation goals have been met and patient is ready for discharge  "

## 2023-03-26 ENCOUNTER — HEALTH MAINTENANCE LETTER (OUTPATIENT)
Age: 58
End: 2023-03-26

## 2023-06-01 ENCOUNTER — HEALTH MAINTENANCE LETTER (OUTPATIENT)
Age: 58
End: 2023-06-01

## 2023-06-01 NOTE — ED AVS SNAPSHOT
[facility]    [address]    [facility] [address]    Phone:  [phone]    Fax:  [phone]                                       aFby Yeung   MRN: [id number]    Department:  [facility]   Date of Visit:  10/14/2018           After Visit Summary Signature Page     I have received my discharge instructions, and my questions have been answered. I have discussed any challenges I see with this plan with the nurse or doctor.    ..........................................................................................................................................  Patient/Patient Representative Signature      ..........................................................................................................................................  Patient Representative Print Name and Relationship to Patient    ..................................................               ................................................  Date                                   Time    ..........................................................................................................................................  Reviewed by Signature/Title    ...................................................              ..............................................  Date                                               Time          22EPIC Rev 08/18         Modified Advancement Flap Text: The defect edges were debeveled with a #15 scalpel blade. Given the location of the defect, shape of the defect and the proximity to free margins a modified advancement flap was deemed most appropriate. Using a sterile surgical marker, an appropriate advancement flap was drawn incorporating the defect and placing the expected incisions within the relaxed skin tension lines where possible. The area thus outlined was incised deep to adipose tissue with a #15 scalpel blade. The skin margins were undermined to an appropriate distance in all directions utilizing iris scissors. Following this, the designed flap was advanced and carried over into the primary defect and sutured into place.

## 2024-02-08 ENCOUNTER — APPOINTMENT (OUTPATIENT)
Dept: CT IMAGING | Facility: CLINIC | Age: 59
End: 2024-02-08
Attending: EMERGENCY MEDICINE
Payer: COMMERCIAL

## 2024-02-08 ENCOUNTER — HOSPITAL ENCOUNTER (EMERGENCY)
Facility: CLINIC | Age: 59
Discharge: HOME | End: 2024-02-08
Attending: EMERGENCY MEDICINE | Admitting: EMERGENCY MEDICINE
Payer: COMMERCIAL

## 2024-02-08 VITALS
SYSTOLIC BLOOD PRESSURE: 125 MMHG | RESPIRATION RATE: 20 BRPM | TEMPERATURE: 98 F | HEART RATE: 104 BPM | OXYGEN SATURATION: 99 % | DIASTOLIC BLOOD PRESSURE: 31 MMHG

## 2024-02-08 DIAGNOSIS — K52.9 ACUTE COLITIS: ICD-10-CM

## 2024-02-08 LAB
ABO/RH(D): ABNORMAL
ALBUMIN SERPL BCG-MCNC: 4.1 G/DL (ref 3.5–5.2)
ALP SERPL-CCNC: 136 U/L (ref 40–150)
ALT SERPL W P-5'-P-CCNC: 112 U/L (ref 0–50)
ANION GAP SERPL CALCULATED.3IONS-SCNC: 10 MMOL/L (ref 7–15)
ANTIBODY SCREEN: POSITIVE
APTT PPP: 27 SECONDS (ref 22–38)
AST SERPL W P-5'-P-CCNC: 103 U/L (ref 0–45)
BASOPHILS # BLD AUTO: 0 10E3/UL (ref 0–0.2)
BASOPHILS NFR BLD AUTO: 0 %
BILIRUB SERPL-MCNC: 0.9 MG/DL
BLOOD BANK CHART COMMENT: NORMAL
BUN SERPL-MCNC: 14.2 MG/DL (ref 8–23)
CALCIUM SERPL-MCNC: 9 MG/DL (ref 8.6–10)
CHLORIDE SERPL-SCNC: 102 MMOL/L (ref 98–107)
CREAT SERPL-MCNC: 0.95 MG/DL (ref 0.51–0.95)
DEPRECATED HCO3 PLAS-SCNC: 24 MMOL/L (ref 22–29)
EGFRCR SERPLBLD CKD-EPI 2021: 69 ML/MIN/1.73M2
EOSINOPHIL # BLD AUTO: 0.2 10E3/UL (ref 0–0.7)
EOSINOPHIL NFR BLD AUTO: 2 %
ERYTHROCYTE [DISTWIDTH] IN BLOOD BY AUTOMATED COUNT: 15.2 % (ref 10–15)
GLUCOSE SERPL-MCNC: 106 MG/DL (ref 70–99)
HCT VFR BLD AUTO: 46.7 % (ref 35–47)
HGB BLD-MCNC: 13.1 G/DL (ref 11.7–15.7)
HGB BLD-MCNC: 15 G/DL (ref 11.7–15.7)
IMM GRANULOCYTES # BLD: 0 10E3/UL
IMM GRANULOCYTES NFR BLD: 0 %
INR PPP: 0.91 (ref 0.85–1.15)
LACTATE SERPL-SCNC: 0.8 MMOL/L (ref 0.7–2)
LIPASE SERPL-CCNC: 34 U/L (ref 13–60)
LYMPHOCYTES # BLD AUTO: 1.5 10E3/UL (ref 0.8–5.3)
LYMPHOCYTES NFR BLD AUTO: 23 %
MCH RBC QN AUTO: 28.6 PG (ref 26.5–33)
MCHC RBC AUTO-ENTMCNC: 32.1 G/DL (ref 31.5–36.5)
MCV RBC AUTO: 89 FL (ref 78–100)
MONOCYTES # BLD AUTO: 0.5 10E3/UL (ref 0–1.3)
MONOCYTES NFR BLD AUTO: 8 %
NEUTROPHILS # BLD AUTO: 4.4 10E3/UL (ref 1.6–8.3)
NEUTROPHILS NFR BLD AUTO: 67 %
NRBC # BLD AUTO: 0 10E3/UL
NRBC BLD AUTO-RTO: 0 /100
PLATELET # BLD AUTO: 221 10E3/UL (ref 150–450)
POTASSIUM SERPL-SCNC: 4.3 MMOL/L (ref 3.4–5.3)
PROT SERPL-MCNC: 8.4 G/DL (ref 6.4–8.3)
RBC # BLD AUTO: 5.24 10E6/UL (ref 3.8–5.2)
SODIUM SERPL-SCNC: 136 MMOL/L (ref 135–145)
SPECIMEN EXPIRATION DATE: ABNORMAL
SPECIMEN EXPIRATION DATE: NORMAL
WBC # BLD AUTO: 6.6 10E3/UL (ref 4–11)

## 2024-02-08 PROCEDURE — 96374 THER/PROPH/DIAG INJ IV PUSH: CPT | Mod: 59

## 2024-02-08 PROCEDURE — 86870 RBC ANTIBODY IDENTIFICATION: CPT | Performed by: EMERGENCY MEDICINE

## 2024-02-08 PROCEDURE — 74177 CT ABD & PELVIS W/CONTRAST: CPT

## 2024-02-08 PROCEDURE — 84999 UNLISTED CHEMISTRY PROCEDURE: CPT | Performed by: EMERGENCY MEDICINE

## 2024-02-08 PROCEDURE — 81403 MOPATH PROCEDURE LEVEL 4: CPT | Performed by: EMERGENCY MEDICINE

## 2024-02-08 PROCEDURE — 250N000013 HC RX MED GY IP 250 OP 250 PS 637: Performed by: EMERGENCY MEDICINE

## 2024-02-08 PROCEDURE — 85018 HEMOGLOBIN: CPT | Mod: 91 | Performed by: EMERGENCY MEDICINE

## 2024-02-08 PROCEDURE — 80053 COMPREHEN METABOLIC PANEL: CPT | Performed by: EMERGENCY MEDICINE

## 2024-02-08 PROCEDURE — 250N000009 HC RX 250: Performed by: EMERGENCY MEDICINE

## 2024-02-08 PROCEDURE — 258N000003 HC RX IP 258 OP 636: Performed by: EMERGENCY MEDICINE

## 2024-02-08 PROCEDURE — C9113 INJ PANTOPRAZOLE SODIUM, VIA: HCPCS | Performed by: EMERGENCY MEDICINE

## 2024-02-08 PROCEDURE — 83605 ASSAY OF LACTIC ACID: CPT | Performed by: EMERGENCY MEDICINE

## 2024-02-08 PROCEDURE — 85025 COMPLETE CBC W/AUTO DIFF WBC: CPT | Performed by: EMERGENCY MEDICINE

## 2024-02-08 PROCEDURE — 86900 BLOOD TYPING SEROLOGIC ABO: CPT | Performed by: EMERGENCY MEDICINE

## 2024-02-08 PROCEDURE — 86901 BLOOD TYPING SEROLOGIC RH(D): CPT | Performed by: EMERGENCY MEDICINE

## 2024-02-08 PROCEDURE — 96375 TX/PRO/DX INJ NEW DRUG ADDON: CPT

## 2024-02-08 PROCEDURE — 250N000011 HC RX IP 250 OP 636: Performed by: EMERGENCY MEDICINE

## 2024-02-08 PROCEDURE — 99285 EMERGENCY DEPT VISIT HI MDM: CPT | Mod: 25

## 2024-02-08 PROCEDURE — 86978 RBC PRETREATMENT SERUM: CPT | Performed by: EMERGENCY MEDICINE

## 2024-02-08 PROCEDURE — 83690 ASSAY OF LIPASE: CPT | Performed by: EMERGENCY MEDICINE

## 2024-02-08 PROCEDURE — 96361 HYDRATE IV INFUSION ADD-ON: CPT

## 2024-02-08 PROCEDURE — 86880 COOMBS TEST DIRECT: CPT | Performed by: EMERGENCY MEDICINE

## 2024-02-08 PROCEDURE — 86860 RBC ANTIBODY ELUTION: CPT | Performed by: EMERGENCY MEDICINE

## 2024-02-08 PROCEDURE — 85730 THROMBOPLASTIN TIME PARTIAL: CPT | Performed by: EMERGENCY MEDICINE

## 2024-02-08 PROCEDURE — 85610 PROTHROMBIN TIME: CPT | Performed by: EMERGENCY MEDICINE

## 2024-02-08 PROCEDURE — 36415 COLL VENOUS BLD VENIPUNCTURE: CPT | Performed by: EMERGENCY MEDICINE

## 2024-02-08 RX ORDER — ACETAMINOPHEN 325 MG/1
975 TABLET ORAL ONCE
Status: COMPLETED | OUTPATIENT
Start: 2024-02-08 | End: 2024-02-08

## 2024-02-08 RX ORDER — ONDANSETRON 2 MG/ML
4 INJECTION INTRAMUSCULAR; INTRAVENOUS EVERY 30 MIN PRN
Status: DISCONTINUED | OUTPATIENT
Start: 2024-02-08 | End: 2024-02-08 | Stop reason: HOSPADM

## 2024-02-08 RX ORDER — IOPAMIDOL 755 MG/ML
500 INJECTION, SOLUTION INTRAVASCULAR ONCE
Status: COMPLETED | OUTPATIENT
Start: 2024-02-08 | End: 2024-02-08

## 2024-02-08 RX ORDER — OXYCODONE HYDROCHLORIDE 5 MG/1
5 TABLET ORAL EVERY 6 HOURS PRN
Qty: 12 TABLET | Refills: 0 | Status: SHIPPED | OUTPATIENT
Start: 2024-02-08 | End: 2024-02-11

## 2024-02-08 RX ORDER — MORPHINE SULFATE 4 MG/ML
4 INJECTION, SOLUTION INTRAMUSCULAR; INTRAVENOUS
Status: DISCONTINUED | OUTPATIENT
Start: 2024-02-08 | End: 2024-02-08 | Stop reason: HOSPADM

## 2024-02-08 RX ORDER — ONDANSETRON 4 MG/1
4 TABLET, ORALLY DISINTEGRATING ORAL EVERY 8 HOURS PRN
Qty: 10 TABLET | Refills: 0 | Status: SHIPPED | OUTPATIENT
Start: 2024-02-08 | End: 2024-02-11

## 2024-02-08 RX ADMIN — PROCHLORPERAZINE EDISYLATE 10 MG: 5 INJECTION INTRAMUSCULAR; INTRAVENOUS at 05:37

## 2024-02-08 RX ADMIN — IOPAMIDOL 91 ML: 755 INJECTION, SOLUTION INTRAVENOUS at 04:56

## 2024-02-08 RX ADMIN — PANTOPRAZOLE SODIUM 40 MG: 40 INJECTION, POWDER, FOR SOLUTION INTRAVENOUS at 03:48

## 2024-02-08 RX ADMIN — SODIUM CHLORIDE 1000 ML: 9 INJECTION, SOLUTION INTRAVENOUS at 03:43

## 2024-02-08 RX ADMIN — ACETAMINOPHEN 975 MG: 325 TABLET, FILM COATED ORAL at 05:36

## 2024-02-08 RX ADMIN — ONDANSETRON 4 MG: 2 INJECTION INTRAMUSCULAR; INTRAVENOUS at 03:46

## 2024-02-08 RX ADMIN — SODIUM CHLORIDE 63 ML: 9 INJECTION, SOLUTION INTRAVENOUS at 04:56

## 2024-02-08 RX ADMIN — MORPHINE SULFATE 4 MG: 4 INJECTION, SOLUTION INTRAMUSCULAR; INTRAVENOUS at 03:50

## 2024-02-08 ASSESSMENT — ACTIVITIES OF DAILY LIVING (ADL)
ADLS_ACUITY_SCORE: 39
ADLS_ACUITY_SCORE: 39

## 2024-02-08 NOTE — ED PROVIDER NOTES
History     Chief Complaint:  Rectal Bleeding    The history is provided by the patient.     Faby Yeung is a 58 year old female with history of anemia presenting for evaluation of rectal bleeding. Faby reports nausea exacerbated with eating. She states that her abdomen feels distended and is emitting frequent noises. She adds that she noticed blood in her stool tonight. She also notes a headache. She denies vomiting or black stool. She denies use of blood thinners. She reports non excessive alcohol use. She reports a history of stomach ulcers.    Independent Historian:   None - Patient Only    Medications:    Levothyroxine  Protonix  Zoloft  Bicitra  Neurontin  Ativan    Past Medical History:    Anxiety  Depressive disorder  GERD  Nephrolithiasis  Raynaud's syndrome  Scoliosis  Gastritis and gastroduodenitis without mention hemorrhage  Hypothyroidism  UTI  PE  Anemia  Medullary sponge kidney  Renal tubular acidosis  Acute on chronic renal insufficiency  DJD, cervical  Pyelonephritis    Past Surgical History:    Ankle surgery  Appendectomy  Arthrodesis ankle   section  Cystoscopy  Genitourinary surgery  Hernia repair  Hysterectomy total abdominal, bilateral Salpingo-oophorectomy, combined  Nephrolithotomy  Nephrostomy tube placement left  Irrigation and debridement foot, combined  Lithotripsy  Orthopedic surgery, wrist, ankle, Melissa narciso placement  Surgical manipulation of ankle joint  Scoliosis surgery  Repair upper abdominal hernia  Release carpal tunnel  Small intestine surgery  Spine surgery  Ureteroscopy    Physical Exam   Patient Vitals for the past 24 hrs:   BP Temp Temp src Pulse Resp SpO2   24 0632 -- -- -- -- -- 99 %   24 0631 (!) 125/31 -- -- 104 20 99 %   24 0630 -- -- -- -- -- 99 %   24 0510 -- -- -- -- -- 100 %   24 0450 -- -- -- -- -- 96 %   24 0445 -- -- -- -- -- 98 %   24 0440 -- -- -- -- -- 98 %   24 0435 -- -- -- -- -- 98 %    02/08/24 0430 -- -- -- -- -- 97 %   02/08/24 0425 -- -- -- -- -- 98 %   02/08/24 0420 -- -- -- -- -- 98 %   02/08/24 0403 -- -- -- -- -- 94 %   02/08/24 0402 -- -- -- -- -- (!) 83 %   02/08/24 0400 -- -- -- -- -- 95 %   02/08/24 0357 -- -- -- -- -- 97 %   02/08/24 0355 -- -- -- -- -- 97 %   02/08/24 0353 -- -- -- -- -- 98 %   02/08/24 0352 -- -- -- -- -- 92 %   02/08/24 0349 -- -- -- -- -- 90 %   02/08/24 0305 (!) 129/92 98  F (36.7  C) Temporal 116 16 95 %     Physical Exam  General: Patient is awake, alert and interactive when I enter the room. Appears uncomfortable.   Head: The scalp, face, and head appear normal  Eyes: Conjunctivae and sclerae are normal  Neck: Normal range of motion.   CV: Regular rate.   Resp:  No respiratory distress.   GI: diffuse tenderness but abdomen is soft, no rigidity. No evidence of pulsatile mass. No fluid waves or evidence of ascites. No distension. No hernias or bruising are noted in detailed exam. No CVA tenderness.    MS: Normal tone.   Skin: Normal capillary refill noted  Neuro: Speech is normal and fluent. Face is symmetric. Moving all extremities.   Psych:  Normal affect.  Appropriate interactions.    Emergency Department Course   Imaging:  CT Abdomen Pelvis w Contrast   Final Result   IMPRESSION:    1.  Mural thickening of the distal transverse colon, splenic flexure and proximal descending colon with adjacent pericolonic stranding concerning for colitis, favored to be infectious and/or inflammatory nature ischemic etiology is within the    differential diagnosis although felt to be less likely given the lack of atherosclerotic disease throughout the mesenteric vessels. An underlying colonic mass cannot be completely excluded and additional imaging may be warranted after acute symptoms have    resolved.   2.  Cholelithiasis    3.  Hepatic steatosis   4.  redemonstration of bilateral medullary nephrocalcinosis        Laboratory:  Labs Ordered and Resulted from Time of ED  Arrival to Time of ED Departure   COMPREHENSIVE METABOLIC PANEL - Abnormal       Result Value    Sodium 136      Potassium 4.3      Carbon Dioxide (CO2) 24      Anion Gap 10      Urea Nitrogen 14.2      Creatinine 0.95      GFR Estimate 69      Calcium 9.0      Chloride 102      Glucose 106 (*)     Alkaline Phosphatase 136       (*)      (*)     Protein Total 8.4 (*)     Albumin 4.1      Bilirubin Total 0.9     CBC WITH PLATELETS AND DIFFERENTIAL - Abnormal    WBC Count 6.6      RBC Count 5.24 (*)     Hemoglobin 15.0      Hematocrit 46.7      MCV 89      MCH 28.6      MCHC 32.1      RDW 15.2 (*)     Platelet Count 221      % Neutrophils 67      % Lymphocytes 23      % Monocytes 8      % Eosinophils 2      % Basophils 0      % Immature Granulocytes 0      NRBCs per 100 WBC 0      Absolute Neutrophils 4.4      Absolute Lymphocytes 1.5      Absolute Monocytes 0.5      Absolute Eosinophils 0.2      Absolute Basophils 0.0      Absolute Immature Granulocytes 0.0      Absolute NRBCs 0.0     TYPE AND SCREEN, ADULT - Abnormal    ABO/RH(D) B POS      Antibody Screen Positive (*)     SPECIMEN EXPIRATION DATE [id number]     INR - Normal    INR 0.91     PARTIAL THROMBOPLASTIN TIME - Normal    aPTT 27     LIPASE - Normal    Lipase 34     LACTIC ACID WHOLE BLOOD - Normal    Lactic Acid 0.8     HEMOGLOBIN - Normal    Hemoglobin 13.1     LABORATORY MISCELLANEOUS ORDER   BLOOD BANK CHART COMMENT    Blood Bank Chart Comment BB Chart Comment      SPECIMEN EXPIRATION DATE [id number]     ABO/RH TYPE AND SCREEN        Procedures     Emergency Department Course & Assessments:       Interventions:  Medications   ondansetron (ZOFRAN) injection 4 mg (4 mg Intravenous $Given 2/8/24 0346)   morphine (PF) injection 4 mg (4 mg Intravenous $Given 2/8/24 0350)   sodium chloride 0.9% BOLUS 1,000 mL (0 mLs Intravenous Stopped 2/8/24 0608)   pantoprazole (PROTONIX) IV push injection 40 mg (40 mg Intravenous $Given 2/8/24 0348)    CT scan flush (63 mLs Intravenous $Given 2/8/24 4724)   iopamidol (ISOVUE-370) solution 500 mL (91 mLs Intravenous $Given 2/8/24 3576)   prochlorperazine (COMPAZINE) injection 10 mg (10 mg Intravenous $Given 2/8/24 2870)   acetaminophen (TYLENOL) tablet 975 mg (975 mg Oral $Given 2/8/24 1861)        Disposition:  The patient was discharged.     Impression & Plan    Medical Decision Making:  Faby Yeung is a 58 year old female who presents with diffuse abdominal pain and blood in her stool. I considered a broad differential including diverticulitis, colitis, appendicitis, functional bowel disease, constipation, UTI, GIB, pyelonephritis, ureterolithiasis, hernia, etc.  Rare and serious causes were considered as well in this patient such as volvulus, abscess, aneurysmal disease, mesenteric ischemia, etc. The workup in the ED is consistent with colitis.  The differential of this includes ischemic, bacterial, idiopathic, autoimmune, etc. initial hemoglobin was normal.  After IV fluids repeat hemoglobin shows hemoglobin still within normal range.  She is vitally stable. The patient looks well and this point with a reassuring exam so I will not therefore admit for serial exams and further workup.  Patient was counseled on natural history of colitis and possibility of progression to abscess and/or sepsis depending on reason for the colitis.  Lactate is normal.  Low concern for ischemic colitis.   Return for fevers greater than 102, increasing pain, other new symptoms develop.  Colitis handout given.  Questions were answered.      Diagnosis:    ICD-10-CM    1. Acute colitis  K52.9 Enteric Bacteria and Virus Panel by [facility] Stool           Discharge Medications:  New Prescriptions    ONDANSETRON (ZOFRAN ODT) 4 MG ODT TAB    Take 1 tablet (4 mg) by mouth every 8 hours as needed for nausea    OXYCODONE (ROXICODONE) 5 MG TABLET    Take 1 tablet (5 mg) by mouth every 6 hours as needed for breakthrough pain     Scribe  Disclosure:  I, Paula Michaela, am serving as a scribe at 3:19 AM on 2/8/2024 to document services personally performed by Will Villalta MD based on my observations and the provider's statements to me.   2/8/2024   Will Villalta MD Battista, Christopher Joseph, MD  02/08/24 0656

## 2024-02-08 NOTE — DISCHARGE INSTRUCTIONS
You are found to have evidence of colitis today on your CT scan.  We will treat you with pain medications and antinausea medications.  If you continue to have symptoms please follow-up with gastroenterology.  If you have any worsening of symptoms or additional symptoms such as fever please return to the emergency department for further evaluation and treatment.

## 2024-02-08 NOTE — ED NOTES
0430 Dr. Villalta notified if patient were to need blood there will be a delay due to antibodies per lab.

## 2024-02-09 LAB
PERFORMING LABORATORY: NORMAL
TEST NAME: NORMAL

## 2024-05-27 ENCOUNTER — HOSPITAL ENCOUNTER (EMERGENCY)
Facility: CLINIC | Age: 59
Discharge: HOME | End: 2024-05-27
Attending: EMERGENCY MEDICINE | Admitting: EMERGENCY MEDICINE
Payer: COMMERCIAL

## 2024-05-27 ENCOUNTER — APPOINTMENT (OUTPATIENT)
Dept: GENERAL RADIOLOGY | Facility: CLINIC | Age: 59
End: 2024-05-27
Attending: EMERGENCY MEDICINE
Payer: COMMERCIAL

## 2024-05-27 VITALS
BODY MASS INDEX: 28.38 KG/M2 | HEIGHT: 64 IN | TEMPERATURE: 98 F | SYSTOLIC BLOOD PRESSURE: 163 MMHG | WEIGHT: 166.23 LBS | DIASTOLIC BLOOD PRESSURE: 107 MMHG | HEART RATE: 97 BPM | RESPIRATION RATE: 18 BRPM | OXYGEN SATURATION: 98 %

## 2024-05-27 DIAGNOSIS — R05.2 SUBACUTE COUGH: ICD-10-CM

## 2024-05-27 DIAGNOSIS — H66.93 ACUTE BILATERAL OTITIS MEDIA: ICD-10-CM

## 2024-05-27 DIAGNOSIS — J18.9 COMMUNITY ACQUIRED PNEUMONIA, UNSPECIFIED LATERALITY: ICD-10-CM

## 2024-05-27 LAB
ANION GAP SERPL CALCULATED.3IONS-SCNC: 12 MMOL/L (ref 7–15)
BASOPHILS # BLD AUTO: 0 10E3/UL (ref 0–0.2)
BASOPHILS NFR BLD AUTO: 0 %
BUN SERPL-MCNC: 13.2 MG/DL (ref 8–23)
CALCIUM SERPL-MCNC: 9.2 MG/DL (ref 8.6–10)
CHLORIDE SERPL-SCNC: 99 MMOL/L (ref 98–107)
CREAT SERPL-MCNC: 0.98 MG/DL (ref 0.51–0.95)
DEPRECATED HCO3 PLAS-SCNC: 25 MMOL/L (ref 22–29)
EGFRCR SERPLBLD CKD-EPI 2021: 66 ML/MIN/1.73M2
EOSINOPHIL # BLD AUTO: 0.2 10E3/UL (ref 0–0.7)
EOSINOPHIL NFR BLD AUTO: 2 %
ERYTHROCYTE [DISTWIDTH] IN BLOOD BY AUTOMATED COUNT: 13.2 % (ref 10–15)
FLUAV RNA SPEC QL NAA+PROBE: NEGATIVE
FLUBV RNA RESP QL NAA+PROBE: NEGATIVE
GLUCOSE SERPL-MCNC: 89 MG/DL (ref 70–99)
HCT VFR BLD AUTO: 40.4 % (ref 35–47)
HGB BLD-MCNC: 13 G/DL (ref 11.7–15.7)
IMM GRANULOCYTES # BLD: 0.1 10E3/UL
IMM GRANULOCYTES NFR BLD: 1 %
LYMPHOCYTES # BLD AUTO: 1.3 10E3/UL (ref 0.8–5.3)
LYMPHOCYTES NFR BLD AUTO: 14 %
MCH RBC QN AUTO: 29.3 PG (ref 26.5–33)
MCHC RBC AUTO-ENTMCNC: 32.2 G/DL (ref 31.5–36.5)
MCV RBC AUTO: 91 FL (ref 78–100)
MONOCYTES # BLD AUTO: 0.8 10E3/UL (ref 0–1.3)
MONOCYTES NFR BLD AUTO: 8 %
NEUTROPHILS # BLD AUTO: 7 10E3/UL (ref 1.6–8.3)
NEUTROPHILS NFR BLD AUTO: 75 %
NRBC # BLD AUTO: 0 10E3/UL
NRBC BLD AUTO-RTO: 0 /100
PLATELET # BLD AUTO: 405 10E3/UL (ref 150–450)
POTASSIUM SERPL-SCNC: 4 MMOL/L (ref 3.4–5.3)
RBC # BLD AUTO: 4.44 10E6/UL (ref 3.8–5.2)
RSV RNA SPEC NAA+PROBE: NEGATIVE
SARS-COV-2 RNA RESP QL NAA+PROBE: NEGATIVE
SODIUM SERPL-SCNC: 136 MMOL/L (ref 135–145)
WBC # BLD AUTO: 9.4 10E3/UL (ref 4–11)

## 2024-05-27 PROCEDURE — 80048 BASIC METABOLIC PNL TOTAL CA: CPT | Performed by: EMERGENCY MEDICINE

## 2024-05-27 PROCEDURE — 87637 SARSCOV2&INF A&B&RSV AMP PRB: CPT | Performed by: EMERGENCY MEDICINE

## 2024-05-27 PROCEDURE — 71046 X-RAY EXAM CHEST 2 VIEWS: CPT

## 2024-05-27 PROCEDURE — 250N000013 HC RX MED GY IP 250 OP 250 PS 637: Performed by: EMERGENCY MEDICINE

## 2024-05-27 PROCEDURE — 36415 COLL VENOUS BLD VENIPUNCTURE: CPT | Performed by: EMERGENCY MEDICINE

## 2024-05-27 PROCEDURE — 85025 COMPLETE CBC W/AUTO DIFF WBC: CPT | Performed by: EMERGENCY MEDICINE

## 2024-05-27 PROCEDURE — 99284 EMERGENCY DEPT VISIT MOD MDM: CPT | Mod: 25

## 2024-05-27 RX ORDER — CEFPODOXIME PROXETIL 200 MG/1
200 TABLET, FILM COATED ORAL 2 TIMES DAILY
Qty: 20 TABLET | Refills: 0 | Status: SHIPPED | OUTPATIENT
Start: 2024-05-27 | End: 2024-05-31

## 2024-05-27 RX ORDER — AZITHROMYCIN 250 MG/1
250 TABLET, FILM COATED ORAL DAILY
Qty: 4 TABLET | Refills: 0 | Status: SHIPPED | OUTPATIENT
Start: 2024-05-27 | End: 2024-05-31

## 2024-05-27 RX ORDER — CEFDINIR 300 MG/1
300 CAPSULE ORAL ONCE
Status: COMPLETED | OUTPATIENT
Start: 2024-05-27 | End: 2024-05-27

## 2024-05-27 RX ORDER — CEFPODOXIME PROXETIL 200 MG/1
200 TABLET, FILM COATED ORAL ONCE
Status: DISCONTINUED | OUTPATIENT
Start: 2024-05-27 | End: 2024-05-27

## 2024-05-27 RX ORDER — DOXYCYCLINE 100 MG/1
100 CAPSULE ORAL ONCE
Status: COMPLETED | OUTPATIENT
Start: 2024-05-27 | End: 2024-05-27

## 2024-05-27 RX ORDER — AZITHROMYCIN 250 MG/1
500 TABLET, FILM COATED ORAL ONCE
Status: COMPLETED | OUTPATIENT
Start: 2024-05-27 | End: 2024-05-27

## 2024-05-27 RX ADMIN — CEFDINIR 300 MG: 300 CAPSULE ORAL at 22:54

## 2024-05-27 RX ADMIN — DOXYCYCLINE HYCLATE 100 MG: 100 CAPSULE ORAL at 20:37

## 2024-05-27 RX ADMIN — AZITHROMYCIN DIHYDRATE 500 MG: 250 TABLET ORAL at 22:46

## 2024-05-27 ASSESSMENT — ACTIVITIES OF DAILY LIVING (ADL)
ADLS_ACUITY_SCORE: 39
ADLS_ACUITY_SCORE: 39
ADLS_ACUITY_SCORE: 37
ADLS_ACUITY_SCORE: 39

## 2024-05-27 ASSESSMENT — COLUMBIA-SUICIDE SEVERITY RATING SCALE - C-SSRS
1. IN THE PAST MONTH, HAVE YOU WISHED YOU WERE DEAD OR WISHED YOU COULD GO TO SLEEP AND NOT WAKE UP?: NO
6. HAVE YOU EVER DONE ANYTHING, STARTED TO DO ANYTHING, OR PREPARED TO DO ANYTHING TO END YOUR LIFE?: NO
2. HAVE YOU ACTUALLY HAD ANY THOUGHTS OF KILLING YOURSELF IN THE PAST MONTH?: NO

## 2024-05-28 NOTE — ED PROVIDER NOTES
Emergency Department Note      History of Present Illness     Chief Complaint  Flu Symptoms    HPI  Faby Yeung is a 58 year old female who presents to the ED for evaluation of flu like symptoms. The patient reports she has had bilateral ear fullness and muffled hearing for the last three weeks. She has had a cough for the last two weeks and three nights ago she had a fever of 104.7 F. Last night she had a posterior headache and some ear discomfort which has since resolved. The patient denies history of COPD or tobacco use.    Independent Historian  None    Review of External Notes  None  Past Medical History   Medical History and Problem List  Past Medical History:   Diagnosis Date    Anxiety     Depressive disorder     Gastro-oesophageal reflux disease     Hx of blood clots     Leukocytopenia, unspecified     Nephrolithiasis     Other specified disorders resulting from impaired renal function     Pain in joint, ankle and foot     PONV (postoperative nausea and vomiting)     Raynaud's syndrome     Scoliosis     Unspecified gastritis and gastroduodenitis without mention of hemorrhage     Unspecified hypothyroidism     Urinary tract infection        Medications  azithromycin (ZITHROMAX) 250 MG tablet  cefpodoxime (VANTIN) 200 MG tablet  levothyroxine (SYNTHROID/LEVOTHROID) 175 MCG tablet  pantoprazole (PROTONIX) 40 MG EC tablet  sertraline (ZOLOFT) 50 MG tablet  sodium citrate/citric acid (BICITRA) 500-334 MG/5ML SOLN solution        Surgical History   Past Surgical History:   Procedure Laterality Date    ANKLE SURGERY Left x3    including debridement, hardware removal    ANKLE SURGERY Left 2016    APPENDECTOMY      APPENDECTOMY      ARTHRODESIS ANKLE  3/18/2013    Procedure: ARTHRODESIS ANKLE;  LEFT TALONAVICULAR FUSION (C-ARM);  Surgeon: Cheryl Arroyo MD;  Location: [facility]     SECTION       SECTION      COMBINED CYSTOSCOPY, INSERT STENT URETER(S) Left 3/8/2016    Procedure:  "CYSTOSCOPY LEFT STENT INSERTION;  Surgeon: Rubén De La Cruz MD;  Location: [facility] OR;  Service:     CYSTOSCOPY      GENITOURINARY SURGERY      lithotripsy    HERNIA REPAIR      HYSTERECTOMY      HYSTERECTOMY TOTAL ABDOMINAL, BILATERAL SALPINGO-OOPHORECTOMY, COMBINED      IR NEPHROLITHOTOMY  8/25/2015    IR NEPHROLITHOTOMY  4/5/2016    IR NEPHROSTOMY TUBE PLACEMENT LEFT  3/22/2016    IRRIGATION AND DEBRIDEMENT FOOT, COMBINED  6/5/2013    Procedure: COMBINED IRRIGATION AND DEBRIDEMENT FOOT;  LEFT FOOT IRRIGATION AND DEBRIDEMENT, HARDWARE REMOVAL;  Surgeon: Cheryl Arroyo MD;  [facility]    LITHOTRIPSY      NEPHROLITHOTOMY Right 2015    ORTHOPEDIC SURGERY       wrist,  ankle, sherie narciso placement    OTHER SURGICAL HISTORY Left     SURGICAL MANIPULATION OF ANKLE JOINThardware put in    OTHER SURGICAL HISTORY  X2    SCOLIOSIS SURGERYhardware removal    MN ANESTH,REPAIR UPPER ABD HERNIA NOS      RELEASE CARPAL TUNNEL Bilateral     SMALL INTESTINE SURGERY      SPINE SURGERY      URETEROSCOPY       Physical Exam   Patient Vitals for the past 24 hrs:   BP Temp Temp src Pulse Resp SpO2 Height Weight   05/27/24 2314 (!) 163/107 -- -- 97 18 98 % -- --   05/27/24 1949 (!) 161/99 98  F (36.7  C) Oral 91 18 97 % 1.626 m (5' 4\") 75.4 kg (166 lb 3.6 oz)     Physical Exam  General: Sitting on the ED chair  HEENT: Normocephalic, atraumatic.  Bilateral TMs with bulge and opaque middle ear effusion consistent with otitis media  Cardiac: Warm and well perfused, regular rate and rhythm  Pulm: Breathing comfortably, no accessory muscle usage, no conversational dyspnea, and lungs clear bilaterally  MSK: No bony deformities  Skin: Warm and dry  Neuro: Moves all extremities  Psych: Pleasant mood and affect    Diagnostics   Lab Results   Labs Ordered and Resulted from Time of ED Arrival to Time of ED Departure   BASIC METABOLIC PANEL - Abnormal       Result Value    Sodium 136      Potassium 4.0      Chloride 99      " Carbon Dioxide (CO2) 25      Anion Gap 12      Urea Nitrogen 13.2      Creatinine 0.98 (*)     GFR Estimate 66      Calcium 9.2      Glucose 89     INFLUENZA A/B, RSV, & SARS-COV2 PCR - Normal    Influenza A PCR Negative      Influenza B PCR Negative      RSV PCR Negative      SARS CoV2 PCR Negative     CBC WITH PLATELETS AND DIFFERENTIAL    WBC Count 9.4      RBC Count 4.44      Hemoglobin 13.0      Hematocrit 40.4      MCV 91      MCH 29.3      MCHC 32.2      RDW 13.2      Platelet Count 405      % Neutrophils 75      % Lymphocytes 14      % Monocytes 8      % Eosinophils 2      % Basophils 0      % Immature Granulocytes 1      NRBCs per 100 WBC 0      Absolute Neutrophils 7.0      Absolute Lymphocytes 1.3      Absolute Monocytes 0.8      Absolute Eosinophils 0.2      Absolute Basophils 0.0      Absolute Immature Granulocytes 0.1      Absolute NRBCs 0.0         Imaging  XR Chest 2 Views   Final Result   IMPRESSION: Normal size of cardiomediastinal silhouette. New bibasilar alveolar opacities, can be seen in setting of aspiration and/or developing pneumonia. No pleural effusion or pneumothorax. Bones are unchanged. Thoracolumbar scoliosis is again noted.        Independent Interpretation  CXR shows bibasilar infiltrates  ED Course    Medications Administered  Medications   doxycycline hyclate (VIBRAMYCIN) capsule 100 mg (100 mg Oral $Given 5/27/24 2037)   azithromycin (ZITHROMAX) tablet 500 mg (500 mg Oral $Given 5/27/24 2246)   cefdinir (OMNICEF) capsule 300 mg (300 mg Oral $Given 5/27/24 2254)       Discussion of Management  None    Social Determinants of Health adding to complexity of care  None    ED Course  ED Course as of 05/28/24 0215   Mon May 27, 2024   2003 Initial Examination   2247 Consulted with hospital pharmacist about antibiotic prescription, opted to use Omnicef in place of Cefpodoxine.      Medical Decision Making / Diagnosis   CMS Diagnoses: None    MIPS: None    ERIC Yeung is a 58  year old female who presents with ear fullness for several weeks, couple weeks of cough, fever at home few days ago.  Vital signs are thankfully stable.  Exam is consistent with bilateral otitis media.  With the patient's ongoing cough, a chest x-ray obtained and shows signs of pneumonia.  Viral multiplex negative.  Patient given her first dose of antibiotics in the ED since it is currently memorial day.  Plan is for discharge home on cefpodoxime and azithromycin to cover for community-acquired pneumonia, cefpodoxime and also covering for otitis media, and recommending primary care follow-up to assess for resolution and for further management.    Disposition  The patient was discharged.     ICD-10 Codes:    ICD-10-CM    1. Acute bilateral otitis media  H66.93       2. Subacute cough  R05.2       3. Community acquired pneumonia, unspecified laterality  J18.9          Discharge Medications  Discharge Medication List as of 5/27/2024 11:09 PM        START taking these medications    Details   azithromycin (ZITHROMAX) 250 MG tablet Take 1 tablet (250 mg) by mouth daily for 4 days, Disp-4 tablet, R-0, E-PrescribeFirst dose of 500mg given in ED on 5/27/24      cefpodoxime (VANTIN) 200 MG tablet Take 1 tablet (200 mg) by mouth 2 times daily for 10 days, Disp-20 tablet, R-0, E-Prescribe           Scribe Disclosure:  I, Rowdy Loya, am serving as a scribe at 8:00 PM on 5/27/2024 to document services personally performed by Marshall Hoyos MD based on my observations and the provider's statements to me.          Marshall Hoyos MD  05/28/24 1770

## 2024-05-28 NOTE — ED TRIAGE NOTES
"Has had flu symptoms for 3 weeks. \"And I can't hear because my ears are so plugged up\". Excrutiating pain in ambika ears at 0300. Nyquil taken a couple hours PTA. \"And there's just constant rattling in my chest\" Denies CP.        "

## 2024-05-31 ENCOUNTER — HOSPITAL ENCOUNTER (EMERGENCY)
Facility: CLINIC | Age: 59
Discharge: HOME | End: 2024-05-31
Attending: EMERGENCY MEDICINE | Admitting: EMERGENCY MEDICINE
Payer: COMMERCIAL

## 2024-05-31 VITALS
TEMPERATURE: 97.1 F | WEIGHT: 167.33 LBS | DIASTOLIC BLOOD PRESSURE: 110 MMHG | HEIGHT: 64 IN | OXYGEN SATURATION: 94 % | BODY MASS INDEX: 28.57 KG/M2 | SYSTOLIC BLOOD PRESSURE: 161 MMHG | HEART RATE: 83 BPM | RESPIRATION RATE: 18 BRPM

## 2024-05-31 DIAGNOSIS — H66.93 BILATERAL ACUTE OTITIS MEDIA: ICD-10-CM

## 2024-05-31 DIAGNOSIS — R05.1 ACUTE COUGH: ICD-10-CM

## 2024-05-31 LAB
ANION GAP SERPL CALCULATED.3IONS-SCNC: 15 MMOL/L (ref 7–15)
BASOPHILS # BLD AUTO: 0.1 10E3/UL (ref 0–0.2)
BASOPHILS NFR BLD AUTO: 1 %
BUN SERPL-MCNC: 13.4 MG/DL (ref 8–23)
CALCIUM SERPL-MCNC: 9.8 MG/DL (ref 8.6–10)
CHLORIDE SERPL-SCNC: 101 MMOL/L (ref 98–107)
CREAT SERPL-MCNC: 0.89 MG/DL (ref 0.51–0.95)
DEPRECATED HCO3 PLAS-SCNC: 24 MMOL/L (ref 22–29)
EGFRCR SERPLBLD CKD-EPI 2021: 74 ML/MIN/1.73M2
EOSINOPHIL # BLD AUTO: 0.2 10E3/UL (ref 0–0.7)
EOSINOPHIL NFR BLD AUTO: 3 %
ERYTHROCYTE [DISTWIDTH] IN BLOOD BY AUTOMATED COUNT: 13.3 % (ref 10–15)
GLUCOSE SERPL-MCNC: 78 MG/DL (ref 70–99)
HCT VFR BLD AUTO: 47 % (ref 35–47)
HGB BLD-MCNC: 14.8 G/DL (ref 11.7–15.7)
IMM GRANULOCYTES # BLD: 0.2 10E3/UL
IMM GRANULOCYTES NFR BLD: 4 %
LYMPHOCYTES # BLD AUTO: 1.4 10E3/UL (ref 0.8–5.3)
LYMPHOCYTES NFR BLD AUTO: 22 %
MCH RBC QN AUTO: 28.8 PG (ref 26.5–33)
MCHC RBC AUTO-ENTMCNC: 31.5 G/DL (ref 31.5–36.5)
MCV RBC AUTO: 91 FL (ref 78–100)
MONOCYTES # BLD AUTO: 0.4 10E3/UL (ref 0–1.3)
MONOCYTES NFR BLD AUTO: 7 %
NEUTROPHILS # BLD AUTO: 3.9 10E3/UL (ref 1.6–8.3)
NEUTROPHILS NFR BLD AUTO: 63 %
NRBC # BLD AUTO: 0 10E3/UL
NRBC BLD AUTO-RTO: 0 /100
PLATELET # BLD AUTO: 430 10E3/UL (ref 150–450)
POTASSIUM SERPL-SCNC: 3.9 MMOL/L (ref 3.4–5.3)
RBC # BLD AUTO: 5.14 10E6/UL (ref 3.8–5.2)
SODIUM SERPL-SCNC: 140 MMOL/L (ref 135–145)
TROPONIN T SERPL HS-MCNC: 32 NG/L
WBC # BLD AUTO: 6 10E3/UL (ref 4–11)

## 2024-05-31 PROCEDURE — 96361 HYDRATE IV INFUSION ADD-ON: CPT

## 2024-05-31 PROCEDURE — 80048 BASIC METABOLIC PNL TOTAL CA: CPT | Performed by: EMERGENCY MEDICINE

## 2024-05-31 PROCEDURE — 258N000003 HC RX IP 258 OP 636: Performed by: EMERGENCY MEDICINE

## 2024-05-31 PROCEDURE — 96374 THER/PROPH/DIAG INJ IV PUSH: CPT

## 2024-05-31 PROCEDURE — 93005 ELECTROCARDIOGRAM TRACING: CPT

## 2024-05-31 PROCEDURE — 84484 ASSAY OF TROPONIN QUANT: CPT | Performed by: EMERGENCY MEDICINE

## 2024-05-31 PROCEDURE — 36415 COLL VENOUS BLD VENIPUNCTURE: CPT | Performed by: EMERGENCY MEDICINE

## 2024-05-31 PROCEDURE — 99284 EMERGENCY DEPT VISIT MOD MDM: CPT | Mod: 25

## 2024-05-31 PROCEDURE — 85004 AUTOMATED DIFF WBC COUNT: CPT | Performed by: EMERGENCY MEDICINE

## 2024-05-31 PROCEDURE — 250N000011 HC RX IP 250 OP 636: Performed by: EMERGENCY MEDICINE

## 2024-05-31 PROCEDURE — 250N000013 HC RX MED GY IP 250 OP 250 PS 637: Performed by: EMERGENCY MEDICINE

## 2024-05-31 RX ORDER — TRAMADOL HYDROCHLORIDE 50 MG/1
50 TABLET ORAL ONCE
Status: COMPLETED | OUTPATIENT
Start: 2024-05-31 | End: 2024-05-31

## 2024-05-31 RX ORDER — CEFDINIR 300 MG/1
300 CAPSULE ORAL 2 TIMES DAILY
Qty: 14 CAPSULE | Refills: 0 | Status: SHIPPED | OUTPATIENT
Start: 2024-05-31 | End: 2024-06-07

## 2024-05-31 RX ORDER — DEXAMETHASONE SODIUM PHOSPHATE 10 MG/ML
10 INJECTION, SOLUTION INTRAMUSCULAR; INTRAVENOUS ONCE
Status: COMPLETED | OUTPATIENT
Start: 2024-05-31 | End: 2024-05-31

## 2024-05-31 RX ADMIN — DEXAMETHASONE SODIUM PHOSPHATE 10 MG: 10 INJECTION, SOLUTION INTRAMUSCULAR; INTRAVENOUS at 19:27

## 2024-05-31 RX ADMIN — TRAMADOL HYDROCHLORIDE 50 MG: 50 TABLET, COATED ORAL at 19:27

## 2024-05-31 RX ADMIN — SODIUM CHLORIDE 1000 ML: 9 INJECTION, SOLUTION INTRAVENOUS at 19:17

## 2024-05-31 ASSESSMENT — COLUMBIA-SUICIDE SEVERITY RATING SCALE - C-SSRS
2. HAVE YOU ACTUALLY HAD ANY THOUGHTS OF KILLING YOURSELF IN THE PAST MONTH?: NO
1. IN THE PAST MONTH, HAVE YOU WISHED YOU WERE DEAD OR WISHED YOU COULD GO TO SLEEP AND NOT WAKE UP?: NO
6. HAVE YOU EVER DONE ANYTHING, STARTED TO DO ANYTHING, OR PREPARED TO DO ANYTHING TO END YOUR LIFE?: NO

## 2024-05-31 ASSESSMENT — ACTIVITIES OF DAILY LIVING (ADL)
ADLS_ACUITY_SCORE: 39
ADLS_ACUITY_SCORE: 37

## 2024-05-31 NOTE — ED TRIAGE NOTES
Pt arrives ambulatory with daughter for ongoing headache, bilateral ear pain, coughing and congestion. Seen in ED May 27th and diagnosed with otitis media and pneumonia. One zithromax but pharmacy was unable to fill other antibiotic.

## 2024-05-31 NOTE — ED PROVIDER NOTES
Emergency Department Note      History of Present Illness     Chief Complaint  Otalgia    HPI  Faby Yeung is a 58 year old female who presents to the ED with her daughter for evaluation of otalgia. Faby reports she presented to [facility] ED 4 days ago with 3 weeks of flu-like symptoms, was diagnosed with pneumonia, bilateral otitis media, and subacute cough. She was discharged home with Zithromax and Vantin, which she has been taking since but without relief. Patient endorses fevers with maximum temperature of 104.7 F 6 days ago, bilateral otalgia, head congestion, cough, rib pain, and myalgias secondary to coughing. She otherwise has been taking Tylenol, Sudafed, Zyrtec, Claritin, and Mucinex without significant relief. Mentions she is trying to eat and drink sufficiently. Notes having a sick contact before illness onset. Has some seasonal allergies which are typically mild.     Independent Historian  None    Review of External Notes  I reviewed the ER note from 2024, negative chest x-ray, reassuring labs.  Past Medical History   Medical History and Problem List  Reactive depression  DJD, cervical  Chronic pain  Anemia  PE  Nephrocalcinosis  Renal tubular acidosis  Hypothyroidism  Medullary sponge kidney  Nephrolithiasis  MDD  AYDEN  GERD  Leukocytopenia  Raynaud's syndrome  Scoliosis  Gastritis and gastroduodenitis  UTI    Medications  Gabapentin  Pantoprazole  Sertraline  Levothyroxine    Surgical History   Past Surgical History:   Procedure Laterality Date    ANKLE SURGERY Left x3    including debridement, hardware removal    ANKLE SURGERY Left 2016    APPENDECTOMY      APPENDECTOMY      ARTHRODESIS ANKLE  3/18/2013    Procedure: ARTHRODESIS ANKLE;  LEFT TALONAVICULAR FUSION (C-ARM);  Surgeon: Cheryl Arroyo MD;  Location: [facility]     SECTION       SECTION      COMBINED CYSTOSCOPY, INSERT STENT URETER(S) Left 3/8/2016    Procedure: CYSTOSCOPY LEFT STENT INSERTION;  Surgeon: Rubén  "JESUS De La Cruz MD;  Location: [facility] OR;  Service:     CYSTOSCOPY      GENITOURINARY SURGERY      lithotripsy    HERNIA REPAIR      HYSTERECTOMY      HYSTERECTOMY TOTAL ABDOMINAL, BILATERAL SALPINGO-OOPHORECTOMY, COMBINED      IR NEPHROLITHOTOMY  8/25/2015    IR NEPHROLITHOTOMY  4/5/2016    IR NEPHROSTOMY TUBE PLACEMENT LEFT  3/22/2016    IRRIGATION AND DEBRIDEMENT FOOT, COMBINED  6/5/2013    Procedure: COMBINED IRRIGATION AND DEBRIDEMENT FOOT;  LEFT FOOT IRRIGATION AND DEBRIDEMENT, HARDWARE REMOVAL;  Surgeon: Cheryl Arroyo MD;  Location: [facility]    LITHOTRIPSY      NEPHROLITHOTOMY Right 2015    ORTHOPEDIC SURGERY       wrist,  ankle, sherie narciso placement    OTHER SURGICAL HISTORY Left     SURGICAL MANIPULATION OF ANKLE JOINThardware put in    OTHER SURGICAL HISTORY  X2    SCOLIOSIS SURGERYhardware removal    NM ANESTH,REPAIR UPPER ABD HERNIA NOS      RELEASE CARPAL TUNNEL Bilateral     SMALL INTESTINE SURGERY      SPINE SURGERY      URETEROSCOPY       Physical Exam   Patient Vitals for the past 24 hrs:   BP Temp Temp src Pulse Resp SpO2 Height Weight   05/31/24 2030 (!) 161/110 -- -- 83 18 94 % -- --   05/31/24 1820 (!) 150/115 97.1  F (36.2  C) Temporal 112 20 96 % 1.626 m (5' 4\") 75.9 kg (167 lb 5.3 oz)     Physical Exam  Constitutional: Alert, attentive, GCS 15  HENT:    Nose: Nose normal.    Mouth/Throat: Oropharynx is clear, mucous membranes are moist  Ears: Bilateral erythematous bulging TMs  Eyes: EOM are normal, anicteric, conjugate gaze  CV: regular rate and rhythm   Chest: Effort normal and breath sounds clear without wheezing or rales, symmetric bilaterally   GI:  non tender. No distension. No guarding or rebound.    MSK: No LE edema, no tenderness to palpation of BLE.  Neurological: Alert, attentive, moving all extremities equally.   Skin: Skin is warm and dry.   Diagnostics   Lab Results   Labs Ordered and Resulted from Time of ED Arrival to Time of ED Departure   TROPONIN T, HIGH " SENSITIVITY - Abnormal       Result Value    Troponin T, High Sensitivity 32 (*)    BASIC METABOLIC PANEL - Normal    Sodium 140      Potassium 3.9      Chloride 101      Carbon Dioxide (CO2) 24      Anion Gap 15      Urea Nitrogen 13.4      Creatinine 0.89      GFR Estimate 74      Calcium 9.8      Glucose 78     CBC WITH PLATELETS AND DIFFERENTIAL    WBC Count 6.0      RBC Count 5.14      Hemoglobin 14.8      Hematocrit 47.0      MCV 91      MCH 28.8      MCHC 31.5      RDW 13.3      Platelet Count 430      % Neutrophils 63      % Lymphocytes 22      % Monocytes 7      % Eosinophils 3      % Basophils 1      % Immature Granulocytes 4      NRBCs per 100 WBC 0      Absolute Neutrophils 3.9      Absolute Lymphocytes 1.4      Absolute Monocytes 0.4      Absolute Eosinophils 0.2      Absolute Basophils 0.1      Absolute Immature Granulocytes 0.2      Absolute NRBCs 0.0         Imaging  None    EKG   ECG results from 05/31/24   EKG 12 lead     Value    Systolic Blood Pressure     Diastolic Blood Pressure     Ventricular Rate 85    Atrial Rate 85    AL Interval 168    QRS Duration 86        QTc 452    P Axis 57    R AXIS 43    T Axis 58    Interpretation ECG      Sinus rhythm  Possible Left atrial enlargement  Borderline ECG  When compared with ECG of 23-MAR-2023 18:41,  No significant change was found  Read by Dr. Nj at 2045          Independent Interpretation  None  ED Course    Medications Administered  Medications   sodium chloride 0.9% BOLUS 1,000 mL (0 mLs Intravenous Stopped 5/31/24 2045)   traMADol (ULTRAM) tablet 50 mg (50 mg Oral $Given 5/31/24 1927)   dexAMETHasone PF (DECADRON) injection 10 mg (10 mg Intravenous $Given 5/31/24 1927)       Procedures  Procedures     Discussion of Management  None    Social Determinants of Health adding to complexity of care  None    ED Course  ED Course as of 06/01/24 0106   Fri May 31, 2024   1853 I obtained history and examined the patient as noted above.    2045 I  rechecked the patient and explained findings. Patient discharged home with instructions regarding supportive care, medications, and reasons to return. The importance of close follow-up was reviewed.      Medical Decision Making / Diagnosis   CMS Diagnoses: None    MIPS     None    MDM  Faby Yeung is a 58 year old female past medical history seen for hypothyroidism, RTA, medullary sponge kidney presenting for now 3 weeks of chest congestion, cough and ear pain, she was seen 5/27, diagnosed with bilateral otitis media, prescribed azithromycin and cefpodoxime due to penicillin allergy but could not fill the cefpodoxime due to insurance issues and presents for persistent ear pain and continued cough.  She had a negative chest x-ray 4 days ago, this was performed several days after her last fever, she has clear lungs today, I do not see any indication for repeat chest imaging, she is satting well on room air.  She does have continued bilateral acute otitis media, I have high suspicion this is viral mediated however due to persistent symptoms, I will switch her cefpodoxime to cefdinir and recommended she continue azithromycin.  She does endorse chest pain however this was predominantly with coughing, feeling it even down into her lower abs.  Her screening EKG shows no ischemic changes, she does have a detectable troponin however this is chronically elevated, I have low suspicion for ACS.  She was given a dose of Decadron, recommend continued aggressive over-the-counter medication including allergy medication and PCP follow-up.  Return precautions were reviewed and she was discharged.    Disposition  The patient was discharged.     ICD-10 Codes:    ICD-10-CM    1. Bilateral acute otitis media  H66.93       2. Acute cough  R05.1            Discharge Medications  Discharge Medication List as of 5/31/2024  8:45 PM        START taking these medications    Details   cefdinir (OMNICEF) 300 MG capsule Take 1 capsule  (300 mg) by mouth 2 times daily for 7 days, Disp-14 capsule, R-0, Local Print               Scribe Disclosure:  I, Lorelei Lane, am serving as a scribe at 8:44 PM on 5/31/2024 to document services personally performed by Jed Nj MD based on my observations and the provider's statements to me.        Jed Nj MD  06/01/24 0107

## 2024-06-01 NOTE — DISCHARGE INSTRUCTIONS
As reviewed, I would continue to take Tylenol, use Afrin and nasal spray for 3 days, use Sudafed, over-the-counter  allergy medication and Mucinex.  You should take the cefdinir as prescribed, finish the azithromycin.  You should make an appointment follow-up with your primary doctor for recheck of your blood pressure, return to the emergency room should develop worsening chest pain, chest pressure or heaviness.

## 2024-06-03 LAB
ATRIAL RATE - MUSE: 85 BPM
DIASTOLIC BLOOD PRESSURE - MUSE: NORMAL MMHG
INTERPRETATION ECG - MUSE: NORMAL
P AXIS - MUSE: 57 DEGREES
PR INTERVAL - MUSE: 168 MS
QRS DURATION - MUSE: 86 MS
QT - MUSE: 380 MS
QTC - MUSE: 452 MS
R AXIS - MUSE: 43 DEGREES
SYSTOLIC BLOOD PRESSURE - MUSE: NORMAL MMHG
T AXIS - MUSE: 58 DEGREES
VENTRICULAR RATE- MUSE: 85 BPM

## 2024-10-13 ENCOUNTER — HEALTH MAINTENANCE LETTER (OUTPATIENT)
Age: 59
End: 2024-10-13